# Patient Record
Sex: FEMALE | Race: WHITE | Employment: FULL TIME | URBAN - METROPOLITAN AREA
[De-identification: names, ages, dates, MRNs, and addresses within clinical notes are randomized per-mention and may not be internally consistent; named-entity substitution may affect disease eponyms.]

---

## 2017-02-13 ENCOUNTER — OFFICE VISIT (OUTPATIENT)
Dept: INTERNAL MEDICINE CLINIC | Age: 62
End: 2017-02-13

## 2017-02-13 VITALS
SYSTOLIC BLOOD PRESSURE: 119 MMHG | WEIGHT: 140 LBS | TEMPERATURE: 97.2 F | DIASTOLIC BLOOD PRESSURE: 70 MMHG | BODY MASS INDEX: 21.93 KG/M2 | HEART RATE: 75 BPM

## 2017-02-13 DIAGNOSIS — J44.1 COPD WITH EXACERBATION (HCC): Primary | ICD-10-CM

## 2017-02-13 DIAGNOSIS — J06.9 VIRAL URI WITH COUGH: ICD-10-CM

## 2017-02-13 DIAGNOSIS — J45.21 ASTHMATIC BRONCHITIS, MILD INTERMITTENT, WITH ACUTE EXACERBATION: ICD-10-CM

## 2017-02-13 PROCEDURE — 99215 OFFICE O/P EST HI 40 MIN: CPT | Performed by: INTERNAL MEDICINE

## 2017-02-13 PROCEDURE — 94640 AIRWAY INHALATION TREATMENT: CPT | Performed by: INTERNAL MEDICINE

## 2017-02-13 RX ORDER — BENZONATATE 100 MG/1
100 CAPSULE ORAL 3 TIMES DAILY PRN
Qty: 30 CAPSULE | Refills: 0 | Status: SHIPPED | OUTPATIENT
Start: 2017-02-13 | End: 2017-02-20

## 2017-02-13 RX ORDER — SULFAMETHOXAZOLE AND TRIMETHOPRIM 800; 160 MG/1; MG/1
1 TABLET ORAL 2 TIMES DAILY
Qty: 20 TABLET | Refills: 0 | Status: SHIPPED | OUTPATIENT
Start: 2017-02-13 | End: 2017-02-23

## 2017-02-13 RX ORDER — ALBUTEROL SULFATE 90 UG/1
2 AEROSOL, METERED RESPIRATORY (INHALATION) EVERY 6 HOURS PRN
Qty: 1 INHALER | Refills: 3 | Status: SHIPPED | OUTPATIENT
Start: 2017-02-13 | End: 2018-10-16

## 2017-02-24 RX ORDER — IPRATROPIUM BROMIDE AND ALBUTEROL SULFATE 2.5; .5 MG/3ML; MG/3ML
1 SOLUTION RESPIRATORY (INHALATION) ONCE
Status: COMPLETED | OUTPATIENT
Start: 2017-02-24 | End: 2017-03-08

## 2017-02-28 RX ORDER — ALBUTEROL SULFATE 2.5 MG/3ML
2.5 SOLUTION RESPIRATORY (INHALATION) ONCE
Status: COMPLETED | OUTPATIENT
Start: 2017-02-28 | End: 2017-03-09

## 2017-03-08 RX ADMIN — IPRATROPIUM BROMIDE AND ALBUTEROL SULFATE 1 AMPULE: 2.5; .5 SOLUTION RESPIRATORY (INHALATION) at 13:49

## 2017-03-09 RX ADMIN — ALBUTEROL SULFATE 2.5 MG: 2.5 SOLUTION RESPIRATORY (INHALATION) at 13:07

## 2017-08-18 DIAGNOSIS — G43.009 MIGRAINE WITHOUT AURA AND WITHOUT STATUS MIGRAINOSUS, NOT INTRACTABLE: ICD-10-CM

## 2017-08-21 RX ORDER — SUMATRIPTAN 100 MG/1
TABLET, FILM COATED ORAL
Qty: 9 TABLET | Refills: 10 | Status: SHIPPED | OUTPATIENT
Start: 2017-08-21 | End: 2018-10-08 | Stop reason: SDUPTHER

## 2017-11-06 DIAGNOSIS — Z12.11 COLON CANCER SCREENING: Primary | ICD-10-CM

## 2018-10-01 DIAGNOSIS — G43.009 MIGRAINE WITHOUT AURA AND WITHOUT STATUS MIGRAINOSUS, NOT INTRACTABLE: ICD-10-CM

## 2018-10-02 RX ORDER — SUMATRIPTAN 100 MG/1
TABLET, FILM COATED ORAL
Qty: 9 TABLET | Refills: 9 | OUTPATIENT
Start: 2018-10-02

## 2018-10-02 NOTE — TELEPHONE ENCOUNTER
Patient requesting a medication refill.   Medication: sumatriptan  Dosage: 100 mg  Frequency: 1 tab at onset of headache  Last filled on: 8/7/18  [facility]: [facility]

## 2018-10-08 DIAGNOSIS — G43.009 MIGRAINE WITHOUT AURA AND WITHOUT STATUS MIGRAINOSUS, NOT INTRACTABLE: ICD-10-CM

## 2018-10-09 RX ORDER — SUMATRIPTAN 100 MG/1
TABLET, FILM COATED ORAL
Qty: 9 TABLET | Refills: 10 | Status: SHIPPED | OUTPATIENT
Start: 2018-10-09 | End: 2018-10-16 | Stop reason: SDUPTHER

## 2018-10-16 ENCOUNTER — OFFICE VISIT (OUTPATIENT)
Dept: INTERNAL MEDICINE CLINIC | Age: 63
End: 2018-10-16
Payer: OTHER GOVERNMENT

## 2018-10-16 VITALS
BODY MASS INDEX: 21.97 KG/M2 | OXYGEN SATURATION: 99 % | HEART RATE: 76 BPM | SYSTOLIC BLOOD PRESSURE: 126 MMHG | HEIGHT: 67 IN | WEIGHT: 140 LBS | RESPIRATION RATE: 16 BRPM | DIASTOLIC BLOOD PRESSURE: 71 MMHG

## 2018-10-16 DIAGNOSIS — Z72.0 TOBACCO ABUSE: ICD-10-CM

## 2018-10-16 DIAGNOSIS — Z11.59 NEED FOR HEPATITIS C SCREENING TEST: ICD-10-CM

## 2018-10-16 DIAGNOSIS — Z13.220 SCREENING, LIPID: ICD-10-CM

## 2018-10-16 DIAGNOSIS — Z23 NEED FOR IMMUNIZATION AGAINST INFLUENZA: ICD-10-CM

## 2018-10-16 DIAGNOSIS — Z13.29 SCREENING FOR THYROID DISORDER: ICD-10-CM

## 2018-10-16 DIAGNOSIS — J01.90 ACUTE NON-RECURRENT SINUSITIS, UNSPECIFIED LOCATION: ICD-10-CM

## 2018-10-16 DIAGNOSIS — G43.009 MIGRAINE WITHOUT AURA AND WITHOUT STATUS MIGRAINOSUS, NOT INTRACTABLE: Primary | ICD-10-CM

## 2018-10-16 DIAGNOSIS — Z12.31 ENCOUNTER FOR SCREENING MAMMOGRAM FOR BREAST CANCER: ICD-10-CM

## 2018-10-16 DIAGNOSIS — Z11.4 SCREENING FOR HIV (HUMAN IMMUNODEFICIENCY VIRUS): ICD-10-CM

## 2018-10-16 DIAGNOSIS — J45.20 MILD INTERMITTENT ASTHMA WITHOUT COMPLICATION: ICD-10-CM

## 2018-10-16 PROCEDURE — 99406 BEHAV CHNG SMOKING 3-10 MIN: CPT | Performed by: INTERNAL MEDICINE

## 2018-10-16 PROCEDURE — 90471 IMMUNIZATION ADMIN: CPT | Performed by: INTERNAL MEDICINE

## 2018-10-16 PROCEDURE — 90682 RIV4 VACC RECOMBINANT DNA IM: CPT | Performed by: INTERNAL MEDICINE

## 2018-10-16 PROCEDURE — 99214 OFFICE O/P EST MOD 30 MIN: CPT | Performed by: INTERNAL MEDICINE

## 2018-10-16 RX ORDER — SUMATRIPTAN 100 MG/1
TABLET, FILM COATED ORAL
Qty: 9 TABLET | Refills: 10 | Status: SHIPPED | OUTPATIENT
Start: 2018-10-16 | End: 2019-12-05 | Stop reason: SDUPTHER

## 2018-10-16 RX ORDER — AZITHROMYCIN 250 MG/1
TABLET, FILM COATED ORAL
Qty: 1 PACKET | Refills: 0 | Status: SHIPPED | OUTPATIENT
Start: 2018-10-16 | End: 2018-10-20

## 2018-10-16 ASSESSMENT — ENCOUNTER SYMPTOMS
COUGH: 0
CONSTIPATION: 0
VOMITING: 0
SHORTNESS OF BREATH: 0
WHEEZING: 0
STRIDOR: 0
BACK PAIN: 0
EYE DISCHARGE: 0
EYE REDNESS: 0
SORE THROAT: 0
DIARRHEA: 0
ABDOMINAL PAIN: 0
EYE PAIN: 0
BLOOD IN STOOL: 0

## 2018-10-16 ASSESSMENT — PATIENT HEALTH QUESTIONNAIRE - PHQ9
SUM OF ALL RESPONSES TO PHQ9 QUESTIONS 1 & 2: 0
1. LITTLE INTEREST OR PLEASURE IN DOING THINGS: 0
SUM OF ALL RESPONSES TO PHQ QUESTIONS 1-9: 0
SUM OF ALL RESPONSES TO PHQ QUESTIONS 1-9: 0
2. FEELING DOWN, DEPRESSED OR HOPELESS: 0

## 2019-11-07 ENCOUNTER — HOSPITAL ENCOUNTER (EMERGENCY)
Age: 64
Discharge: HOME | End: 2019-11-07
Attending: EMERGENCY MEDICINE
Payer: OTHER GOVERNMENT

## 2019-11-07 VITALS
DIASTOLIC BLOOD PRESSURE: 80 MMHG | TEMPERATURE: 98.1 F | WEIGHT: 145.5 LBS | OXYGEN SATURATION: 99 % | HEART RATE: 76 BPM | BODY MASS INDEX: 22.84 KG/M2 | SYSTOLIC BLOOD PRESSURE: 128 MMHG | HEIGHT: 67 IN | RESPIRATION RATE: 14 BRPM

## 2019-11-07 DIAGNOSIS — N39.0 ACUTE LOWER URINARY TRACT INFECTION: Primary | ICD-10-CM

## 2019-11-07 LAB
BACTERIA: ABNORMAL /HPF
BILIRUBIN URINE: NEGATIVE
BLOOD, URINE: ABNORMAL
CLARITY: CLEAR
COLOR: ABNORMAL
EPITHELIAL CELLS, UA: ABNORMAL /HPF
GLUCOSE URINE: 100 MG/DL
KETONES, URINE: NEGATIVE MG/DL
LEUKOCYTE ESTERASE, URINE: ABNORMAL
MICROSCOPIC EXAMINATION: YES
NITRITE, URINE: POSITIVE
PH UA: 5.5 (ref 5–8)
PROTEIN UA: 100 MG/DL
RBC UA: ABNORMAL /HPF (ref 0–2)
RENAL EPITHELIAL, UA: ABNORMAL /HPF
SPECIFIC GRAVITY UA: 1.02 (ref 1–1.03)
TRICHOMONAS: ABNORMAL /HPF
URINE REFLEX TO CULTURE: YES
URINE TYPE: ABNORMAL
UROBILINOGEN, URINE: 4 E.U./DL
WBC UA: ABNORMAL /HPF (ref 0–5)

## 2019-11-07 PROCEDURE — 87077 CULTURE AEROBIC IDENTIFY: CPT

## 2019-11-07 PROCEDURE — 87186 SC STD MICRODIL/AGAR DIL: CPT

## 2019-11-07 PROCEDURE — 81001 URINALYSIS AUTO W/SCOPE: CPT

## 2019-11-07 PROCEDURE — 87086 URINE CULTURE/COLONY COUNT: CPT

## 2019-11-07 PROCEDURE — 99283 EMERGENCY DEPT VISIT LOW MDM: CPT

## 2019-11-07 RX ORDER — PHENAZOPYRIDINE HYDROCHLORIDE 200 MG/1
200 TABLET, FILM COATED ORAL 3 TIMES DAILY PRN
Qty: 6 TABLET | Refills: 0 | Status: SHIPPED | OUTPATIENT
Start: 2019-11-07 | End: 2019-11-10

## 2019-11-07 RX ORDER — NITROFURANTOIN 25; 75 MG/1; MG/1
100 CAPSULE ORAL 2 TIMES DAILY
Qty: 14 CAPSULE | Refills: 0 | Status: SHIPPED | OUTPATIENT
Start: 2019-11-07 | End: 2019-11-14

## 2019-11-07 ASSESSMENT — PAIN SCALES - GENERAL: PAINLEVEL_OUTOF10: 0

## 2019-11-10 LAB
ORGANISM: ABNORMAL
URINE CULTURE, ROUTINE: ABNORMAL

## 2019-12-04 DIAGNOSIS — G43.009 MIGRAINE WITHOUT AURA AND WITHOUT STATUS MIGRAINOSUS, NOT INTRACTABLE: ICD-10-CM

## 2019-12-05 RX ORDER — SUMATRIPTAN 100 MG/1
TABLET, FILM COATED ORAL
Qty: 9 TABLET | Refills: 10 | Status: SHIPPED | OUTPATIENT
Start: 2019-12-05 | End: 2020-12-02

## 2019-12-13 ENCOUNTER — OFFICE VISIT (OUTPATIENT)
Dept: INTERNAL MEDICINE CLINIC | Age: 64
End: 2019-12-13
Payer: OTHER GOVERNMENT

## 2019-12-13 VITALS
SYSTOLIC BLOOD PRESSURE: 127 MMHG | HEART RATE: 78 BPM | OXYGEN SATURATION: 97 % | DIASTOLIC BLOOD PRESSURE: 66 MMHG | WEIGHT: 147 LBS | RESPIRATION RATE: 18 BRPM | BODY MASS INDEX: 23.02 KG/M2

## 2019-12-13 DIAGNOSIS — J45.20 MILD INTERMITTENT ASTHMA WITHOUT COMPLICATION: ICD-10-CM

## 2019-12-13 DIAGNOSIS — Z72.0 TOBACCO ABUSE: ICD-10-CM

## 2019-12-13 DIAGNOSIS — G43.009 MIGRAINE WITHOUT AURA AND WITHOUT STATUS MIGRAINOSUS, NOT INTRACTABLE: Primary | ICD-10-CM

## 2019-12-13 DIAGNOSIS — M85.80 OSTEOPENIA DETERMINED BY X-RAY: ICD-10-CM

## 2019-12-13 DIAGNOSIS — Z12.11 SCREEN FOR COLON CANCER: ICD-10-CM

## 2019-12-13 DIAGNOSIS — R92.8 ABNORMAL MAMMOGRAM: ICD-10-CM

## 2019-12-13 PROCEDURE — 99214 OFFICE O/P EST MOD 30 MIN: CPT | Performed by: INTERNAL MEDICINE

## 2019-12-13 PROCEDURE — 99406 BEHAV CHNG SMOKING 3-10 MIN: CPT | Performed by: INTERNAL MEDICINE

## 2019-12-13 ASSESSMENT — ENCOUNTER SYMPTOMS
NAUSEA: 0
VOICE CHANGE: 0
CHOKING: 1
EYE ITCHING: 0
ANAL BLEEDING: 0
ABDOMINAL PAIN: 0
ABDOMINAL DISTENTION: 0
PHOTOPHOBIA: 0
VOMITING: 0
RECTAL PAIN: 0
COUGH: 0
GASTROINTESTINAL NEGATIVE: 1
DIARRHEA: 0
SORE THROAT: 0
COLOR CHANGE: 0
SINUS PAIN: 0
STRIDOR: 0
EYE PAIN: 0
CHEST TIGHTNESS: 0
TROUBLE SWALLOWING: 0
ALLERGIC/IMMUNOLOGIC NEGATIVE: 1
EYE REDNESS: 0
SHORTNESS OF BREATH: 0
SINUS PRESSURE: 0
BLOOD IN STOOL: 0
RHINORRHEA: 0
EYE DISCHARGE: 0
CONSTIPATION: 0
FACIAL SWELLING: 0
BACK PAIN: 0
WHEEZING: 0
APNEA: 0

## 2019-12-13 ASSESSMENT — PATIENT HEALTH QUESTIONNAIRE - PHQ9
1. LITTLE INTEREST OR PLEASURE IN DOING THINGS: 0
SUM OF ALL RESPONSES TO PHQ QUESTIONS 1-9: 0
SUM OF ALL RESPONSES TO PHQ QUESTIONS 1-9: 0
SUM OF ALL RESPONSES TO PHQ9 QUESTIONS 1 & 2: 0
2. FEELING DOWN, DEPRESSED OR HOPELESS: 0

## 2019-12-30 ENCOUNTER — TELEPHONE (OUTPATIENT)
Dept: INTERNAL MEDICINE CLINIC | Age: 64
End: 2019-12-30

## 2020-01-09 ENCOUNTER — TELEPHONE (OUTPATIENT)
Dept: INTERNAL MEDICINE CLINIC | Age: 65
End: 2020-01-09

## 2020-11-30 RX ORDER — SUMATRIPTAN 100 MG/1
TABLET, FILM COATED ORAL
Qty: 9 TABLET | Refills: 8 | OUTPATIENT
Start: 2020-11-30

## 2020-11-30 NOTE — TELEPHONE ENCOUNTER
Requested Prescriptions     Pending Prescriptions Disp Refills    SUMAtriptan (IMITREX) 100 MG tablet [Pharmacy Med Name: SUMAtriptan SUCC 100 MG TABLET] 9 tablet 8     Sig: TAKE ONE TABLET BY MOUTH AT ONSET OF HEADACHE   Patient requesting a medication refill.   Pharmacy: [facility]  Next office visit: Visit date not found  Last regular office visit: 12/13/2019

## 2020-12-02 NOTE — TELEPHONE ENCOUNTER
Patient called and made an appointment. Requested Prescriptions     Pending Prescriptions Disp Refills    SUMAtriptan (IMITREX) 100 MG tablet [Pharmacy Med Name: SUMAtriptan SUCC 100 MG TABLET] 9 tablet 8     Sig: TAKE ONE TABLET BY MOUTH AT ONSET OF HEADACHE   Patient requesting a medication refill.     [facility]  Next office visit: 12/22/2020  Last regular office visit: 12/13/2019

## 2020-12-03 RX ORDER — SUMATRIPTAN 100 MG/1
TABLET, FILM COATED ORAL
Qty: 9 TABLET | Refills: 0 | Status: SHIPPED | OUTPATIENT
Start: 2020-12-03 | End: 2021-05-24 | Stop reason: SDUPTHER

## 2020-12-22 ENCOUNTER — TELEPHONE (OUTPATIENT)
Dept: INTERNAL MEDICINE CLINIC | Age: 65
End: 2020-12-22

## 2021-05-24 ENCOUNTER — OFFICE VISIT (OUTPATIENT)
Dept: INTERNAL MEDICINE CLINIC | Age: 66
End: 2021-05-24
Payer: OTHER GOVERNMENT

## 2021-05-24 VITALS
HEART RATE: 71 BPM | BODY MASS INDEX: 22.76 KG/M2 | WEIGHT: 145 LBS | DIASTOLIC BLOOD PRESSURE: 76 MMHG | HEIGHT: 67 IN | OXYGEN SATURATION: 97 % | SYSTOLIC BLOOD PRESSURE: 121 MMHG

## 2021-05-24 DIAGNOSIS — Z00.00 WELL WOMAN EXAM WITHOUT GYNECOLOGICAL EXAM: Primary | ICD-10-CM

## 2021-05-24 DIAGNOSIS — M85.89 OSTEOPENIA OF MULTIPLE SITES: ICD-10-CM

## 2021-05-24 DIAGNOSIS — Z72.0 TOBACCO ABUSE: ICD-10-CM

## 2021-05-24 DIAGNOSIS — G43.009 MIGRAINE WITHOUT AURA AND WITHOUT STATUS MIGRAINOSUS, NOT INTRACTABLE: ICD-10-CM

## 2021-05-24 DIAGNOSIS — Z12.31 ENCOUNTER FOR SCREENING MAMMOGRAM FOR MALIGNANT NEOPLASM OF BREAST: ICD-10-CM

## 2021-05-24 DIAGNOSIS — Z98.82 BREAST IMPLANT STATUS: ICD-10-CM

## 2021-05-24 DIAGNOSIS — M85.80 OSTEOPENIA DETERMINED BY X-RAY: ICD-10-CM

## 2021-05-24 DIAGNOSIS — Z12.11 SCREEN FOR COLON CANCER: ICD-10-CM

## 2021-05-24 PROCEDURE — 99406 BEHAV CHNG SMOKING 3-10 MIN: CPT | Performed by: INTERNAL MEDICINE

## 2021-05-24 PROCEDURE — 99213 OFFICE O/P EST LOW 20 MIN: CPT | Performed by: INTERNAL MEDICINE

## 2021-05-24 PROCEDURE — 99397 PER PM REEVAL EST PAT 65+ YR: CPT | Performed by: INTERNAL MEDICINE

## 2021-05-24 RX ORDER — SUMATRIPTAN 100 MG/1
TABLET, FILM COATED ORAL
Qty: 9 TABLET | Refills: 11 | Status: SHIPPED | OUTPATIENT
Start: 2021-05-24 | End: 2021-12-17

## 2021-05-24 ASSESSMENT — PATIENT HEALTH QUESTIONNAIRE - PHQ9
SUM OF ALL RESPONSES TO PHQ QUESTIONS 1-9: 0
2. FEELING DOWN, DEPRESSED OR HOPELESS: 0
SUM OF ALL RESPONSES TO PHQ9 QUESTIONS 1 & 2: 0
SUM OF ALL RESPONSES TO PHQ QUESTIONS 1-9: 0

## 2021-05-24 NOTE — PROGRESS NOTES
SUBJECTIVE:   77 y.o. female for annual routine Pap and checkup. Current Outpatient Medications   Medication Sig Dispense Refill    SUMAtriptan (IMITREX) 100 MG tablet TAKE ONE TABLET BY MOUTH AT ONSET OF HEADACHE 9 tablet 0     No current facility-administered medications for this visit. Allergies: Patient has no known allergies. No LMP recorded. Patient is postmenopausal.    ROS:  Feeling well. No dyspnea or chest pain on exertion. No abdominal pain, change in bowel habits, black or bloody stools. No urinary tract symptoms. GYN ROS: {gyn ros:935656::\"normal menses, no abnormal bleeding, pelvic pain or discharge\",\"no breast pain or new or enlarging lumps on self exam\"}. No neurological complaints. OBJECTIVE:   The patient appears well, alert, oriented x 3, in no distress. /76 (Site: Right Upper Arm, Position: Sitting, Cuff Size: Medium Adult)   Pulse 71   Ht 5' 7\" (1.702 m)   Wt 145 lb (65.8 kg)   SpO2 97%   BMI 22.71 kg/m²   ENT normal.  Neck supple. No adenopathy or thyromegaly. AWAIS. Lungs are clear, good air entry, no wheezes, rhonchi or rales. S1 and S2 normal, no murmurs, regular rate and rhythm. Abdomen soft without tenderness, guarding, mass or organomegaly. Extremities show no edema, normal peripheral pulses. Neurological is normal, no focal findings.     BREAST EXAM: {pe breast exam:029525::\"breasts appear normal, no suspicious masses, no skin or nipple changes or axillary nodes\"}    PELVIC EXAM: {pelvic exam:217674::\"normal external genitalia, vulva, vagina, cervix, uterus and adnexa\"}    ASSESSMENT:   {gyn assessment:623480::\"well woman\"}    PLAN:   {gyn plan:785824::\"mammogram\",\"pap smear\",\"return annually or prn\"}

## 2021-05-24 NOTE — PATIENT INSTRUCTIONS
a colonoscopy. Sigmoidoscopy. This test lets your doctor look at the lining of your rectum and the lowest part of your colon. Your doctor uses a lighted tube called a sigmoidoscope. This test can't find cancers or polyps in the upper part of your colon. In some cases, polyps that are found can be removed. But if your doctor finds polyps, you will need to have a colonoscopy to check the upper part of your colon. Colonoscopy. This test lets your doctor look at the lining of your rectum and your entire colon. The doctor uses a thin, flexible tool called a colonoscope. It can also be used to remove polyps or get a tissue sample (biopsy). A less common test is CT colonography (CTC). It's also called virtual colonoscopy. Who should be screened for colorectal cancer? Your risk for colorectal cancer gets higher as you get older. Some experts say that adults should start regular screening at age 48 and stop at age 76. Others say to start before age 48 or continue after age 76. Talk with your doctor about your risk and when to start and stop screening. How often you need screening depends on the type of test you get:  Stool tests. Every 1 or 2 years for FIT or gFOBT. Every 3 years for sDNA, also called FIT-DNA. Tests that look inside the colon. Every 5 or 10 years for sigmoidoscopy. Every 5 years for CT colonography (virtual colonoscopy). Every 10 years for colonoscopy. Experts agree that people at higher risk may need to be tested sooner. This includes people who have a strong family history of colon cancer. Talk to your doctor about which test is best for you and when to be tested. When should you call for help? Watch closely for changes in your health, and be sure to contact your doctor if:    · You have any changes in your bowel habits.     · You have any problems. Where can you learn more? Go to https://rosa isela.[employer]. org and sign in to your [employer] account.  Enter M541 in the can help you stay healthy. Your doctor has checked your overall health and may have suggested ways to take good care of yourself. Your doctor also may have recommended tests. At home, you can help prevent illness with healthy eating, regular exercise, and other steps. Follow-up care is a key part of your treatment and safety. Be sure to make and go to all appointments, and call your doctor if you are having problems. It's also a good idea to know your test results and keep a list of the medicines you take. How can you care for yourself at home? · Get screening tests that you and your doctor decide on. Screening helps find diseases before any symptoms appear. · Eat healthy foods. Choose fruits, vegetables, whole grains, protein, and low-fat dairy foods. Limit fat, especially saturated fat. Reduce salt in your diet. · Limit alcohol. If you are a man, have no more than 2 drinks a day or 14 drinks a week. If you are a woman, have no more than 1 drink a day or 7 drinks a week. Since alcohol affects older adults differently, you may want to limit alcohol even more. Or you may not want to drink at all. · Get at least 30 minutes of exercise on most days of the week. Walking is a good choice. You also may want to do other activities, such as running, swimming, cycling, or playing tennis or team sports. · Reach and stay at a healthy weight. This will lower your risk for many problems, such as obesity, diabetes, heart disease, and high blood pressure. · Do not smoke. Smoking can make health problems worse. If you need help quitting, talk to your doctor about stop-smoking programs and medicines. These can increase your chances of quitting for good. · Care for your mental health. It is easy to get weighed down by worry and stress. Learn strategies to manage stress, like deep breathing and mindfulness, and stay connected with your family and community. If you find you often feel sad or hopeless, talk with your doctor. Treatment can help. · Talk to your doctor about whether you have any risk factors for sexually transmitted infections (STIs). You can help prevent STIs if you wait to have sex with a new partner (or partners) until you've each been tested for STIs. It also helps if you use condoms (male or female condoms) and if you limit your sex partners to one person who only has sex with you. Vaccines are available for some STIs. · If you think you may have a problem with alcohol or drug use, talk to your doctor. This includes prescription medicines (such as amphetamines and opioids) and illegal drugs (such as cocaine and methamphetamine). Your doctor can help you figure out what type of treatment is best for you. · Protect your skin from too much sun. When you're outdoors from 10 a.m. to 4 p.m., stay in the shade or cover up with clothing and a hat with a wide brim. Wear sunglasses that block UV rays. Even when it's cloudy, put broad-spectrum sunscreen (SPF 30 or higher) on any exposed skin. · See a dentist one or two times a year for checkups and to have your teeth cleaned. · Wear a seat belt in the car. When should you call for help? Watch closely for changes in your health, and be sure to contact your doctor if you have any problems or symptoms that concern you. Where can you learn more? Go to https://chmarkeb.healthShopSavvypartners. org and sign in to your CloudApp[employer] account. Enter I818 in the [employer] box to learn more about \"Well Visit, Over 65: Care Instructions. \"     If you do not have an account, please click on the \"Sign Up Now\" link. Current as of: May 27, 2020               Content Version: 12.8  © 8016-0321 Healthwise, [employer]. Care instructions adapted under license by [facility] [place] ([facility]). If you have questions about a medical condition or this instruction, always ask your healthcare professional. [address] any warranty or liability for your use of this information.

## 2021-05-24 NOTE — PROGRESS NOTES
Chief Complaint   Patient presents with    Annual Exam       HPI: Here for well adult visit. Also requests refills on her sumatriptan hand. She states her headaches may be a little less severe but they have not reduced in frequency. She continues smoking and states she loves it and does not think she would ever be able to quit entirely. However, she is mostly smoking on weekends not during her work week. Walks her dog every other day but no other exercise. She is concerned about a small nevus on her left temple. States she had a cherry angioma for a long time and then it turned dark over the last couple of months. I have reviewed the chart notes available from myself and other providers. I have reviewed and addressed all active problems and created or updated the problems list in detail, as needed. No problem-specific Assessment & Plan notes found for this encounter.       I have extensively reviewed and reconciled the medication list, discontinued medications not taking or no longer appropriate, and updated the active meds list      Lab Results   Component Value Date    LABMICR YES 11/07/2019       Lab Results   Component Value Date     08/11/2015    K 4.4 08/11/2015    CL 97 (L) 08/11/2015    CO2 25 08/11/2015    BUN 12 08/11/2015    CREATININE 0.6 08/11/2015    GLUCOSE 89 08/11/2015    CALCIUM 10.4 08/11/2015       Lab Results   Component Value Date    CHOL 203 (H) 08/11/2015    TRIG 93 08/11/2015    HDL 55 08/11/2015    LDLCALC 129 (H) 08/11/2015       Lab Results   Component Value Date    ALT 11 08/11/2015    AST 15 08/11/2015       No results found for: TSH, T4FREE    No results found for: WBC, HGB, HCT, MCV, PLT    No results found for: INR     No results found for: PSA     No results found for: LABURIC          /76 (Site: Right Upper Arm, Position: Sitting, Cuff Size: Medium Adult)   Pulse 71   Ht 5' 7\" (1.702 m)   Wt 145 lb (65.8 kg)   SpO2 97%   BMI previously documented in the patient's chart, with changes per orders or documentation below:        Assessment and Plan: Patient received counseling and, if relevant,printed instructions for all symptoms listed in CC and HPI, as well as for all diagnoses brought onto today's visit note below. Typical counseling includes, but is not limited to, non pharmacologic measures to manage listed symptoms and conditions; appropriate use, risks and benefits for all prescribed medications; potential interactions between medications both prescribed and OTC; diet; exercise; healthy behaviors; and goalsetting to improve health. Pt.or responsible party was involved in shared decision making and had opportunity to have all questions answered. 1. Well woman exam without gynecological exam  - Cologuard (For External Results Only); Future  - HEPATITIS C ANTIBODY; Future  - LIPID PANEL; Future  - COMPREHENSIVE METABOLIC PANEL; Future    2. Osteopenia of multiple sites--counseled on incresing dietary calcium, weight bearing exercise  - DEXA BONE DENSITY AXIAL SKELETON; Future    3. Migraine without aura and without status migrainosus, not intractable  - SUMAtriptan (IMITREX) 100 MG tablet; TAKE ONE TABLET BY MOUTH AT ONSET OF HEADACHE  Dispense: 9 tablet; Refill: 11    4. Encounter for screening mammogram for malignant neoplasm of breast  - JERMAN DIGITAL SCREEN W OR WO CAD BILATERAL; Future    5. Breast implant status    6. Osteopenia determined by x-ray    7. Tobacco abuse    Patient was counseled on tobacco cessation. Based upon patient's motivation to change her behavior, the following plan was agreed upon: patient will think about his/her triggers for using tobacco and patient will think about reasons why he/she should quit using tobacco.  She was provided with a list of local tobacco cessation resources. Provider spent 5 minutes counseling patient.      8. Screen for colon cancer            Problem List     Migraine Relevant Medications    SUMAtriptan (IMITREX) 100 MG tablet    Osteopenia determined by x-ray    Tobacco abuse    Breast implant status    Osteopenia of multiple sites    Relevant Orders    DEXA BONE DENSITY AXIAL SKELETON        Orders Placed This Encounter   Procedures    Klarissa (For External Results Only)     This test is performed by an external laboratory and is used for result attachment only. It is required that this order requisition be faxed to: [facility] @ [phone]. See [email or web address].AvePoint for further information. Standing Status:   Future     Standing Expiration Date:   5/24/2022    JERMAN DIGITAL SCREEN W OR WO CAD BILATERAL     Standing Status:   Future     Standing Expiration Date:   7/25/2022    DEXA BONE DENSITY AXIAL SKELETON     Standing Status:   Future     Standing Expiration Date:   5/24/2022    HEPATITIS C ANTIBODY     Standing Status:   Future     Standing Expiration Date:   5/24/2022    LIPID PANEL     Standing Status:   Future     Standing Expiration Date:   5/24/2022     Order Specific Question:   Is Patient Fasting?/# of Hours     Answer:   yes    COMPREHENSIVE METABOLIC PANEL     Standing Status:   Future     Standing Expiration Date:   5/24/2022       I have reconciled the medications in chart with what patient reports to be taking, andreviewed action/ sideeffects and how to take any new medications. Patient/caregiver understands purpose and side effects. A complete  list of medications was provided in their after-visit summary. Return in about 1 year (around 5/24/2022) for well adult.

## 2021-12-17 DIAGNOSIS — G43.009 MIGRAINE WITHOUT AURA AND WITHOUT STATUS MIGRAINOSUS, NOT INTRACTABLE: ICD-10-CM

## 2021-12-17 RX ORDER — SUMATRIPTAN 100 MG/1
TABLET, FILM COATED ORAL
Qty: 9 TABLET | Refills: 11 | Status: SHIPPED | OUTPATIENT
Start: 2021-12-17 | End: 2022-06-23 | Stop reason: SDUPTHER

## 2021-12-17 NOTE — TELEPHONE ENCOUNTER
Requested Prescriptions     Pending Prescriptions Disp Refills    SUMAtriptan (IMITREX) 100 MG tablet [Pharmacy Med Name: SUMAtriptan SUCC 100 MG TABLET] 9 tablet 11     Sig: TAKE ONE TABLET BY MOUTH AT ONSET OF HEADACHE       Patient requesting a medication refill.   Last filled on: 11.08.2021  [facility]  Next office visit: Visit date not found  Last regular office visit: 5/24/2021

## 2022-02-14 ENCOUNTER — OFFICE VISIT (OUTPATIENT)
Dept: VASCULAR SURGERY | Age: 67
End: 2022-02-14
Payer: OTHER GOVERNMENT

## 2022-02-14 VITALS
HEART RATE: 88 BPM | WEIGHT: 140 LBS | DIASTOLIC BLOOD PRESSURE: 65 MMHG | HEIGHT: 67 IN | BODY MASS INDEX: 21.97 KG/M2 | SYSTOLIC BLOOD PRESSURE: 113 MMHG

## 2022-02-14 DIAGNOSIS — I80.9 THROMBOPHLEBITIS: Primary | ICD-10-CM

## 2022-02-14 PROCEDURE — 99203 OFFICE O/P NEW LOW 30 MIN: CPT | Performed by: STUDENT IN AN ORGANIZED HEALTH CARE EDUCATION/TRAINING PROGRAM

## 2022-02-14 ASSESSMENT — ENCOUNTER SYMPTOMS
COLOR CHANGE: 1
SHORTNESS OF BREATH: 0

## 2022-02-14 NOTE — PROGRESS NOTES
Betty Vega (:  1955) is a 74 y.o. female,New patient, here for evaluation of the following chief complaint(s):  New Patient (RLE; painful area; 6-8 weeks. wears compression stockings; notices the pain more at night)         ASSESSMENT/PLAN:  1. Thrombophlebitis  -     VL DUP LOWER EXTREMITY VENOUS RIGHT; Future       This is a 67-year-old female patient presents the office today to discuss right lower extremity anterior leg edema and tenderness. Her symptoms are little unusual and innocuous. I suspect maybe she had thrombophlebitis or maybe even a injury that she did not know about. It could have been hematoma however she does not really have remnants of bruising. I think the easiest thing to do is to rule out any sort of significant thrombophlebitic picture. Would recommend a venous duplex to rule out DVT and superficial thrombophlebitis. We will continue to use the compression stockings as a seem to be helping her. Recommend a baby aspirin daily given her likely history of peripheral arterial disease. I cannot palpate her pedal pulses. However she endorses no claudication nor rest pain or anything that otherwise suggest that she has significant arterial insufficiency. Therefore there is no reason to screen for that just yet. However we did have to increase her compression size I would likely go ahead and get screening arterial duplexes to make sure there is no significant limitation in her SRAVAN. All questions were answered. Venous duplex ordered. Subjective   SUBJECTIVE/OBJECTIVE:  This is a 74year old female patient who presented to the office today to discuss right anterior calf edema and tenderness. She does smoke. She denies any claudication or rest pain. She works as a nurse. She has been using a 20-30 mmHg stocking with good efficacy. Her symptoms began several weeks ago. She denies any chest pain or shortness of breath.   Once again she had some efficacy with using the

## 2022-02-21 ENCOUNTER — HOSPITAL ENCOUNTER (OUTPATIENT)
Dept: VASCULAR LAB | Age: 67
Discharge: HOME | End: 2022-02-21
Payer: OTHER GOVERNMENT

## 2022-02-21 DIAGNOSIS — I80.9 THROMBOPHLEBITIS: ICD-10-CM

## 2022-02-21 PROCEDURE — 93971 EXTREMITY STUDY: CPT

## 2022-05-09 ENCOUNTER — OFFICE VISIT (OUTPATIENT)
Dept: VASCULAR SURGERY | Age: 67
End: 2022-05-09
Payer: OTHER GOVERNMENT

## 2022-05-09 VITALS — BODY MASS INDEX: 22.76 KG/M2 | WEIGHT: 145 LBS | HEIGHT: 67 IN

## 2022-05-09 DIAGNOSIS — R22.41 LOCALIZED SWELLING OF RIGHT LOWER LEG: Primary | ICD-10-CM

## 2022-05-09 PROCEDURE — 99213 OFFICE O/P EST LOW 20 MIN: CPT | Performed by: STUDENT IN AN ORGANIZED HEALTH CARE EDUCATION/TRAINING PROGRAM

## 2022-05-09 ASSESSMENT — ENCOUNTER SYMPTOMS
SHORTNESS OF BREATH: 0
COLOR CHANGE: 0

## 2022-05-09 NOTE — PROGRESS NOTES
Bishop Melara (:  1955) is a 74 y.o. female,Established patient, here for evaluation of the following chief complaint(s):  Leg Pain         ASSESSMENT/PLAN:  1. Localized swelling of right lower leg    This is a 74year old female patient who presents for evaluation of right leg swelling. The area of concern did have a round structure with apparent blood vessels around it but no discernible flow within the structure itself. It would seem to have been a thrombosed vein based on its appearance; unusual for it to be a pseudoaneurysm that thrombosed but possible. The working diagnosis at the time was that this was a phlebitic process and would resolve with compression/NSAIDS/warm compresses. Once again all of her symptoms began after an episode of pain walking down steps and then noting bruising in the area; so it would all seem abrupt and traumatic more or less. Since then she has had this intermittent discomfort. No palpable mass underneath the skin; however she does have this asymmetric fullness that is mild but still present when compared to her left leg. Would recommend trying a tighter compression wrap/garment. Would be beneficial to pursue a CT scan with contrast to further evaluate the area. If it is indeed a thrombosed vascular structure, could consider excision to see if it helps her symptoms; although the risk to surround nerve structures is present. Low suspicion for a tumor or something sinister given her recollection of bruising and sharp pain around the time this all began in that location but a CT scan would help further evaluate. Did offer her a second opinion to evaluate but she is open to trying a tighter local compression garment and conservative therapy for now which would include warmth/ice. All other questions answered. Will order CT scan.          Subjective   SUBJECTIVE/OBJECTIVE:  This is a 74year old female patient who presents to the office for evaluation of right lower extremity outer leg swelling. She underwent a duplex in February which demonstrated no evidence of DVT but a solid structure with questionable flow adjacent. The patient denies any resolution of this symptoms. She has discomfort that occurs mainly at the end of the day. Her symptoms began 5 months ago where she noticed bruising in this area and then all of her symptoms began. She had seen other physicians in the past but this was not distinctly a musculoskeletal problem. She endorses pain at night mainly after the end of the day. She denies rest pain in her foot. No other swelling appreciated. No further episodes of bruising appreciated. Review of Systems   Constitutional: Negative for chills and fever. Respiratory: Negative for shortness of breath. Cardiovascular: Positive for leg swelling. Negative for chest pain. Musculoskeletal: Positive for myalgias. Negative for arthralgias and gait problem. Skin: Negative for color change and wound. Neurological: Positive for numbness. Negative for weakness. Hematological: Does not bruise/bleed easily. Objective   Physical Exam  Constitutional:       Appearance: Normal appearance. Cardiovascular:      Rate and Rhythm: Normal rate and regular rhythm. Comments: Non palpable pedal pulses, feet warm to touch, DP/PT signals present  Pulmonary:      Effort: Pulmonary effort is normal. No respiratory distress. Musculoskeletal:      Right lower leg: Edema (localized to distal right outer leg just above ankle mortise ) present. Skin:     General: Skin is warm and dry. Capillary Refill: Capillary refill takes less than 2 seconds. Neurological:      General: No focal deficit present. Mental Status: She is alert. Psychiatric:         Mood and Affect: Mood normal.         Behavior: Behavior normal.         Thought Content:  Thought content normal.         Judgment: Judgment normal.            On this date 5/9/2022 I have spent 25 minutes reviewing previous notes, test results and face to face with the patient discussing the diagnosis and importance of compliance with the treatment plan as well as documenting on the day of the visit.     Jamir Engel DO, FSVS, [address] [facility]

## 2022-05-31 ENCOUNTER — APPOINTMENT (OUTPATIENT)
Dept: CT IMAGING | Age: 67
End: 2022-05-31
Payer: OTHER GOVERNMENT

## 2022-05-31 ENCOUNTER — HOSPITAL ENCOUNTER (OUTPATIENT)
Dept: CT IMAGING | Age: 67
Discharge: HOME | End: 2022-05-31
Payer: OTHER GOVERNMENT

## 2022-05-31 DIAGNOSIS — R22.41 LOCALIZED SWELLING OF RIGHT LOWER LEG: ICD-10-CM

## 2022-05-31 LAB
CREAT SERPL-MCNC: 0.7 MG/DL (ref 0.6–1.2)
GFR AFRICAN AMERICAN: >60
GFR NON-AFRICAN AMERICAN: >60

## 2022-05-31 PROCEDURE — 6360000004 HC RX CONTRAST MEDICATION: Performed by: STUDENT IN AN ORGANIZED HEALTH CARE EDUCATION/TRAINING PROGRAM

## 2022-05-31 PROCEDURE — 36415 COLL VENOUS BLD VENIPUNCTURE: CPT

## 2022-05-31 PROCEDURE — 82565 ASSAY OF CREATININE: CPT

## 2022-05-31 PROCEDURE — 73701 CT LOWER EXTREMITY W/DYE: CPT

## 2022-05-31 RX ADMIN — IOPAMIDOL 100 ML: 755 INJECTION, SOLUTION INTRAVENOUS at 16:22

## 2022-06-01 ENCOUNTER — TELEPHONE (OUTPATIENT)
Dept: SURGERY | Age: 67
End: 2022-06-01

## 2022-06-01 DIAGNOSIS — M79.89 MASS OF SOFT TISSUE OF RIGHT LOWER EXTREMITY: Primary | ICD-10-CM

## 2022-06-02 DIAGNOSIS — M79.89 MASS OF SOFT TISSUE OF RIGHT LOWER EXTREMITY: Primary | ICD-10-CM

## 2022-06-02 NOTE — TELEPHONE ENCOUNTER
Aiden Kamara, DO  You 5 minutes ago (10:30 AM)     SM    Okay MRI with and without contrast right leg    Message text

## 2022-06-13 ENCOUNTER — HOSPITAL ENCOUNTER (OUTPATIENT)
Dept: MRI IMAGING | Age: 67
Discharge: HOME | End: 2022-06-13
Payer: OTHER GOVERNMENT

## 2022-06-13 DIAGNOSIS — M79.89 MASS OF SOFT TISSUE OF RIGHT LOWER EXTREMITY: ICD-10-CM

## 2022-06-13 PROCEDURE — A9577 INJ MULTIHANCE: HCPCS | Performed by: STUDENT IN AN ORGANIZED HEALTH CARE EDUCATION/TRAINING PROGRAM

## 2022-06-13 PROCEDURE — 73720 MRI LWR EXTREMITY W/O&W/DYE: CPT

## 2022-06-13 PROCEDURE — 6360000004 HC RX CONTRAST MEDICATION: Performed by: STUDENT IN AN ORGANIZED HEALTH CARE EDUCATION/TRAINING PROGRAM

## 2022-06-13 RX ADMIN — GADOBENATE DIMEGLUMINE 13 ML: 529 INJECTION, SOLUTION INTRAVENOUS at 17:44

## 2022-06-23 ENCOUNTER — OFFICE VISIT (OUTPATIENT)
Dept: INTERNAL MEDICINE CLINIC | Age: 67
End: 2022-06-23
Payer: OTHER GOVERNMENT

## 2022-06-23 VITALS
WEIGHT: 143 LBS | HEART RATE: 75 BPM | OXYGEN SATURATION: 100 % | DIASTOLIC BLOOD PRESSURE: 72 MMHG | SYSTOLIC BLOOD PRESSURE: 121 MMHG | BODY MASS INDEX: 22.4 KG/M2

## 2022-06-23 DIAGNOSIS — M85.89 OSTEOPENIA OF MULTIPLE SITES: ICD-10-CM

## 2022-06-23 DIAGNOSIS — M40.04 POSTURAL KYPHOSIS OF THORACIC REGION: ICD-10-CM

## 2022-06-23 DIAGNOSIS — Z72.0 TOBACCO ABUSE: ICD-10-CM

## 2022-06-23 DIAGNOSIS — J43.1 PANLOBULAR EMPHYSEMA (HCC): ICD-10-CM

## 2022-06-23 DIAGNOSIS — Z01.818 PRE-OP EVALUATION: Primary | ICD-10-CM

## 2022-06-23 DIAGNOSIS — Z12.31 ENCOUNTER FOR SCREENING MAMMOGRAM FOR MALIGNANT NEOPLASM OF BREAST: ICD-10-CM

## 2022-06-23 DIAGNOSIS — G43.009 MIGRAINE WITHOUT AURA AND WITHOUT STATUS MIGRAINOSUS, NOT INTRACTABLE: ICD-10-CM

## 2022-06-23 DIAGNOSIS — R22.41 MASS OF RIGHT LOWER EXTREMITY: ICD-10-CM

## 2022-06-23 PROBLEM — M40.204 KYPHOSIS OF THORACIC REGION: Status: ACTIVE | Noted: 2022-06-23

## 2022-06-23 PROCEDURE — 99243 OFF/OP CNSLTJ NEW/EST LOW 30: CPT | Performed by: INTERNAL MEDICINE

## 2022-06-23 PROCEDURE — 93000 ELECTROCARDIOGRAM COMPLETE: CPT | Performed by: INTERNAL MEDICINE

## 2022-06-23 RX ORDER — SUMATRIPTAN 100 MG/1
100 TABLET, FILM COATED ORAL DAILY PRN
Qty: 9 TABLET | Refills: 11 | Status: ON HOLD
Start: 2022-06-23 | End: 2022-08-16 | Stop reason: HOSPADM

## 2022-06-23 ASSESSMENT — PATIENT HEALTH QUESTIONNAIRE - PHQ9
SUM OF ALL RESPONSES TO PHQ QUESTIONS 1-9: 0
SUM OF ALL RESPONSES TO PHQ9 QUESTIONS 1 & 2: 0
SUM OF ALL RESPONSES TO PHQ QUESTIONS 1-9: 0
SUM OF ALL RESPONSES TO PHQ QUESTIONS 1-9: 0
2. FEELING DOWN, DEPRESSED OR HOPELESS: 0
SUM OF ALL RESPONSES TO PHQ QUESTIONS 1-9: 0
1. LITTLE INTEREST OR PLEASURE IN DOING THINGS: 0

## 2022-06-23 NOTE — TELEPHONE ENCOUNTER
Requested Prescriptions     Pending Prescriptions Disp Refills    SUMAtriptan (IMITREX) 100 MG tablet 9 tablet 11       Patient requesting a medication refill.   Last filled on:   [facility]  Next office visit: Visit date not found  Last regular office visit: 6/23/2022

## 2022-06-23 NOTE — PROGRESS NOTES
Preoperative Consultation      Priscila Toussaint  YOB: 1955    Date of Service:  6/23/2022    Vitals:    06/23/22 1351   BP: 121/72   Pulse: 75   SpO2: 100%   Weight: 143 lb (64.9 kg)      Wt Readings from Last 2 Encounters:   06/23/22 143 lb (64.9 kg)   06/13/22 145 lb (65.8 kg)     BP Readings from Last 3 Encounters:   06/23/22 121/72   02/14/22 113/65   05/24/21 121/76        Chief Complaint   Patient presents with    Pre-op Exam     No Known Allergies  No outpatient medications have been marked as taking for the 6/23/22 encounter (Office Visit) with Piedad Ventura MD.       This patient presents to the office today for a preoperative consultation at the request of surgeon, Dr. Aubrie Almaguer, who plans on performing RIGHT lower leg biopsy of mass on June 29 at [facility].  The current problem began 6 months ago, and symptoms have been worsening with time. Conservative therapy: Yes: compression, which has been making it worse, discontinued. .Now affecting her use of ankle, causing her to limp.     Planned anesthesia: General ? 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, including shoulder    Seborrheic keratosis    Migraine    Osteopenia determined by x-ray    Abnormal mammogram    SCC (squamous cell carcinoma)    Tobacco abuse    Mild intermittent asthma without complication    Breast implant status    Osteopenia of multiple sites    Panlobular emphysema (HCC)    Kyphosis of thoracic region    Mass of right lower extremity       Past Medical History:   Diagnosis Date    Headache      Past Surgical History:   Procedure Laterality Date    APPENDECTOMY      TONSILLECTOMY       Family History   Problem Relation Age of Onset    Arthritis Mother     High Blood Pressure Mother      Social History     Socioeconomic History    Marital status: Single     Spouse name: Not on file    Number of children: Not on file    Years of education: Not on file    Highest education level: Not on file   Occupational History    Occupation: health tech   Tobacco Use    Smoking status: Current Every Day Smoker     Packs/day: 1.00     Years: 50.00     Pack years: 50.00     Types: Cigarettes    Smokeless tobacco: Never Used   Substance and Sexual Activity    Alcohol use: No    Drug use: No    Sexual activity: Not on file   Other Topics Concern    Not on file   Social History Narrative    Not on file     Social Determinants of Health     Financial Resource Strain:     Difficulty of Paying Living Expenses: Not on file   Food Insecurity:     Worried About Running Out of Food in the Last Year: Not on file    [place] of Food in the Last Year: Not on file   Transportation Needs:     Lack of Transportation (Medical): Not on file    Lack of Transportation (Non-Medical):  Not on file   Physical Activity:     Days of Exercise per Week: Not on file    Minutes of Exercise per Session: Not on file   Stress:     Feeling of Stress : Not on file   Social Connections:     Frequency of Communication with Friends and Family: Not on file    Frequency of Social Gatherings with Friends and Family: Not on file    Attends Bahai Services: Not on file    Active Member of [address] [place] [address] or Organizations: Not on file    Attends Club or Organization Meetings: Not on file    Marital Status: Not on file   Intimate Partner Violence:     Fear of Current or Ex-Partner: Not on file    Emotionally Abused: Not on file    Physically Abused: Not on file    Sexually Abused: Not on file   Housing Stability:     Unable to Pay for Housing in the Last Year: Not on file    Number of [place] in the Last Year: Not on file    Unstable Housing in the Last Year: Not on file       Review of Systems  A comprehensive review of systems was negative except for what was noted in the HPI. Physical Exam   Constitutional: She is oriented to person, place, and time. She appears well-developed and well-nourished. No distress. HENT:   Head: Normocephalic and atraumatic. Mouth/Throat: Uvula is midline, oropharynx is clear and moist and mucous membranes are normal.   Eyes: Conjunctivae and EOM are normal. Pupils are equal, round, and reactive to light. Neck: Trachea normal and normal range of motion. Neck supple. No JVD present. Carotid bruit is not present. No mass and no thyromegaly present. Cardiovascular: Normal rate, regular rhythm, normal heart sounds and intact distal pulses. Exam reveals no gallop and no friction rub. No murmur heard. Pulmonary/Chest: Effort normal and breath sounds normal. No respiratory distress. She has no wheezes. She has no rales. Abdominal: Soft. Normal aorta and bowel sounds are normal. She exhibits no distension and no mass. There is no hepatosplenomegaly. No tenderness. Musculoskeletal: She exhibits no edema and no tenderness. Neurological: She is alert and oriented to person, place, and time. She has normal strength. No cranial nerve deficit or sensory deficit. Coordination and gait normal.   Skin: Skin is warm and dry. No rash noted. No erythema. Psychiatric: She has a normal mood and affect. Her behavior is normal.     EKG Interpretation: no comparison, old anterior MI with septal qs    Lab Review   Hospital Outpatient Visit on 05/31/2022   Component Date Value    CREATININE 05/31/2022 0.7     GFR Non- 05/31/2022 >60     GFR  05/31/2022 >60            Assessment:       74 y.o. patient with planned surgery as above.     Known risk factors for perioperative complications: Tobacco abuse, clinical emphysema (pt never complained of any symptoms)  Current medications which may produce withdrawal symptoms if withheld

## 2022-07-13 ENCOUNTER — OFFICE VISIT (OUTPATIENT)
Dept: INTERNAL MEDICINE CLINIC | Age: 67
End: 2022-07-13
Payer: OTHER GOVERNMENT

## 2022-07-13 ENCOUNTER — HOSPITAL ENCOUNTER (OUTPATIENT)
Age: 67
Discharge: HOME | End: 2022-07-13
Payer: OTHER GOVERNMENT

## 2022-07-13 ENCOUNTER — HOSPITAL ENCOUNTER (OUTPATIENT)
Dept: GENERAL RADIOLOGY | Age: 67
Discharge: HOME | End: 2022-07-13
Payer: OTHER GOVERNMENT

## 2022-07-13 VITALS
SYSTOLIC BLOOD PRESSURE: 117 MMHG | BODY MASS INDEX: 22.98 KG/M2 | HEART RATE: 91 BPM | HEIGHT: 67 IN | WEIGHT: 146.38 LBS | OXYGEN SATURATION: 97 % | DIASTOLIC BLOOD PRESSURE: 66 MMHG

## 2022-07-13 DIAGNOSIS — C44.92 SCC (SQUAMOUS CELL CARCINOMA): ICD-10-CM

## 2022-07-13 DIAGNOSIS — C34.90 METASTATIC NON-SMALL CELL LUNG CANCER (HCC): ICD-10-CM

## 2022-07-13 DIAGNOSIS — J43.1 PANLOBULAR EMPHYSEMA (HCC): ICD-10-CM

## 2022-07-13 DIAGNOSIS — M79.604 PAIN IN BOTH LOWER EXTREMITIES: ICD-10-CM

## 2022-07-13 DIAGNOSIS — Z72.0 TOBACCO ABUSE: ICD-10-CM

## 2022-07-13 DIAGNOSIS — M79.89 LEG SWELLING: Primary | ICD-10-CM

## 2022-07-13 DIAGNOSIS — M79.605 PAIN IN BOTH LOWER EXTREMITIES: ICD-10-CM

## 2022-07-13 DIAGNOSIS — R22.41 MASS OF RIGHT LOWER EXTREMITY: ICD-10-CM

## 2022-07-13 DIAGNOSIS — R16.0 HEPATOMEGALY: ICD-10-CM

## 2022-07-13 DIAGNOSIS — R68.89 ABNORMAL FINDINGS ON AUSCULTATION: ICD-10-CM

## 2022-07-13 PROBLEM — D21.21: Status: ACTIVE | Noted: 2022-06-29

## 2022-07-13 PROCEDURE — 99215 OFFICE O/P EST HI 40 MIN: CPT | Performed by: INTERNAL MEDICINE

## 2022-07-13 PROCEDURE — 71046 X-RAY EXAM CHEST 2 VIEWS: CPT

## 2022-07-13 PROCEDURE — 99406 BEHAV CHNG SMOKING 3-10 MIN: CPT | Performed by: INTERNAL MEDICINE

## 2022-07-13 PROCEDURE — 1123F ACP DISCUSS/DSCN MKR DOCD: CPT | Performed by: INTERNAL MEDICINE

## 2022-07-13 RX ORDER — OXYCODONE HYDROCHLORIDE AND ACETAMINOPHEN 5; 325 MG/1; MG/1
1 TABLET ORAL EVERY 6 HOURS PRN
Qty: 28 TABLET | Refills: 0 | Status: ON HOLD
Start: 2022-07-13 | End: 2022-07-25 | Stop reason: HOSPADM

## 2022-07-13 RX ORDER — FUROSEMIDE 20 MG/1
20 TABLET ORAL DAILY
Qty: 7 TABLET | Refills: 0 | Status: SHIPPED | OUTPATIENT
Start: 2022-07-13 | End: 2022-08-12

## 2022-07-13 NOTE — LETTER
[facility]ICIANS [facility] Internal Medicine and Pediatrics  [address] [facility]  Phone: [phone]  Fax: [phone]    Emiliano Marroquin MD        July 13, 2022     Patient: Wesly Puente   YOB: 1955   Date of Visit: 7/13/2022       To Whom it May Concern:    Nicanor Leonardo was seen in my clinic on 7/13/2022. She may return to work on July 25,2022. If you have any questions or concerns, please don't hesitate to call.     Sincerely,         Emiliano Marroquin MD

## 2022-07-13 NOTE — PROGRESS NOTES
Chief Complaint   Patient presents with    Leg Swelling       HPI: Here to follow-up on leg swelling and pain, occurred upon the recommendation of her orthopedic surgeon, after biopsy at [facility] showed metastatic non-small cell carcinoma from the right distal lateral leg soft tissues. States the pain is all around bilateral legs from them being so tight and swollen. It is not localizing to the surgical site. Although there were multiple other exam findings today, see below, patient denies any nausea, change in appetite, abdominal pain, shortness of breath. She continues smoking but has been thinking about trying to quit since she got her pathology results. .    Medications reviewed and reconciled with what patient reports to be taking. /66   Pulse 91   Ht 5' 7\" (1.702 m)   Wt 146 lb 6 oz (66.4 kg)   SpO2 97%   BMI 22.93 kg/m²     Physical Exam GENERAL: alert, oriented x4, well-appearing in NAD, accompanied by granddaughter      Vitals reviewed from intake /66   Pulse 91   Ht 5' 7\" (1.702 m)   Wt 146 lb 6 oz (66.4 kg)   SpO2 97%   BMI 22.93 kg/m²     HEENT: normocephalic atraumatic clear conj/nares/op     NECK: supple without lymphadenopathy or thyromegaly, no bruit    COR: RRR no murmurs rubs or gallops    LUNGS:nonlabored, diminished air exchange on the right, prolonged expiratory phase    ABDOMEN: soft, nontender, normal bowel sounds, liver hard, irregular, and 6 cm below RCM    EXTREMITIES: warm, dry, well-perfused,tight swollen BLE right > left without erythema or skin breaks other than healing surgical site on  Lateral aspect distal RLE, no cords    DERM: no suspicious lesions, no rashes    NEURO: cranial nerves intact, normal speech and gait    SPINE: straight, supple, nontender without swelling    ASSESSMENT/PLAN:    1. Leg swelling  - Comprehensive Metabolic Panel;  Future  - CBC with Auto Differential; Future  - VL Extremity Venous Bilateral; Future  - furosemide (LASIX) 20 MG tablet; Take 1 tablet by mouth daily  Dispense: 7 tablet; Refill: 0    2. Pain in both lower extremities--excess swelling in setting of cancer, need to rule out DVT, but will also treat pain with intention that oncology would handle long term   - oxyCODONE-acetaminophen (PERCOCET) 5-325 MG per tablet; Take 1 tablet by mouth every 6 hours as needed for Pain for up to 7 days. Intended supply: 7 days. Take lowest dose possible to manage pain  Dispense: 28 tablet; Refill: 0    3. Mass of right lower extremity  - Comprehensive Metabolic Panel; Future  - CBC with Auto Differential; Future    4. Metastatic non-small cell lung cancer (HCC)--counseled patient on diagnosis from pathology report. Has [facility] first appt 7/21/22. Will try to facilitate workup.   - CT CHEST ABDOMEN PELVIS W WO CONTRAST; Future  - PET CT WHOLE BODY; Future  - Comprehensive Metabolic Panel; Future  - CBC with Auto Differential; Future  - VL Extremity Venous Bilateral; Future  - oxyCODONE-acetaminophen (PERCOCET) 5-325 MG per tablet; Take 1 tablet by mouth every 6 hours as needed for Pain for up to 7 days. Intended supply: 7 days. Take lowest dose possible to manage pain  Dispense: 28 tablet; Refill: 0  - XR CHEST (2 VW); Future    5. Hepatomegaly  - Comprehensive Metabolic Panel; Future  - CBC with Auto Differential; Future    6. Abnormal findings on auscultation    7. SCC (squamous cell carcinoma)    8. Panlobular emphysema ([address]  - CT CHEST ABDOMEN PELVIS W WO CONTRAST; Future  - XR CHEST (2 VW); Future    9. Tobacco abuse  - CT CHEST ABDOMEN PELVIS W WO CONTRAST; Future  - XR CHEST (2 VW); Future          Patient was counseled on tobacco cessation. Based upon patient's motivation to change her behavior, the following plan was agreed upon: patient will think about his/her triggers for using tobacco and patient will think about reasons why he/she should quit using tobacco.  She was provided with a list of local tobacco cessation resources. Provider spent 5 minutes counseling patient. -    Problem List Items Addressed This Visit     Panlobular emphysema ([facility]    Relevant Orders    CT CHEST ABDOMEN PELVIS W WO CONTRAST    XR CHEST (2 VW) (Completed)    Mass of right lower extremity    Relevant Orders    Comprehensive Metabolic Panel    CBC with Auto Differential    Metastatic non-small cell lung cancer ([facility]    Relevant Medications    oxyCODONE-acetaminophen (PERCOCET) 5-325 MG per tablet    Other Relevant Orders    CT CHEST ABDOMEN PELVIS W WO CONTRAST    PET CT WHOLE BODY    Comprehensive Metabolic Panel    CBC with Auto Differential    VL Extremity Venous Bilateral    XR CHEST (2 VW) (Completed)    SCC (squamous cell carcinoma)    Relevant Medications    oxyCODONE-acetaminophen (PERCOCET) 5-325 MG per tablet    Tobacco abuse    Relevant Orders    CT CHEST ABDOMEN PELVIS W WO CONTRAST    XR CHEST (2 VW) (Completed)      Other Visit Diagnoses     Leg swelling    -  Primary    Relevant Medications    furosemide (LASIX) 20 MG tablet    Other Relevant Orders    Comprehensive Metabolic Panel    CBC with Auto Differential    VL Extremity Venous Bilateral    Pain in both lower extremities        Relevant Medications    oxyCODONE-acetaminophen (PERCOCET) 5-325 MG per tablet    Other Relevant Orders    VL Extremity Venous Bilateral    Hepatomegaly        Relevant Orders    CT CHEST ABDOMEN PELVIS W WO CONTRAST    Comprehensive Metabolic Panel    CBC with Auto Differential    Abnormal findings on auscultation        Relevant Orders    CT CHEST ABDOMEN PELVIS W WO CONTRAST    XR CHEST (2 VW) (Completed)            No follow-ups on file. I have spent over 40 minutes with this patient and/or guardian. This included time spent on reviewing records, counseling and care coordination.

## 2022-07-13 NOTE — Clinical Note
PH[facility]ICIANS [facility] Internal Medicine and Pediatrics  [address] [facility]  Phone: [phone]  Fax: [phone]    Shubham Snyder MD        July 13, 2022     Patient: Jessi Wilks   YOB: 1955   Date of Visit: 7/13/2022       To Whom It May Concern: It is my medical opinion that Catana Barefoot {Work release (duty restriction):79120}. If you have any questions or concerns, please don't hesitate to call.     Sincerely,        Shubham Snyder MD

## 2022-07-14 ENCOUNTER — TELEPHONE (OUTPATIENT)
Dept: ADMINISTRATIVE | Age: 67
End: 2022-07-14

## 2022-07-14 DIAGNOSIS — C34.90 METASTATIC NON-SMALL CELL LUNG CANCER (HCC): ICD-10-CM

## 2022-07-14 DIAGNOSIS — R22.41 MASS OF RIGHT LOWER EXTREMITY: ICD-10-CM

## 2022-07-14 DIAGNOSIS — R16.0 HEPATOMEGALY: ICD-10-CM

## 2022-07-14 DIAGNOSIS — M79.89 LEG SWELLING: ICD-10-CM

## 2022-07-14 LAB
A/G RATIO: 1.4 (ref 1.1–2.2)
ALBUMIN SERPL-MCNC: 3.8 G/DL (ref 3.4–5)
ALP BLD-CCNC: 544 U/L (ref 40–129)
ALT SERPL-CCNC: 30 U/L (ref 10–40)
ANION GAP SERPL CALCULATED.3IONS-SCNC: 12 MMOL/L (ref 3–16)
AST SERPL-CCNC: 121 U/L (ref 15–37)
BASOPHILS ABSOLUTE: 0.1 K/UL (ref 0–0.2)
BASOPHILS RELATIVE PERCENT: 0.9 %
BILIRUB SERPL-MCNC: 1.2 MG/DL (ref 0–1)
BUN BLDV-MCNC: 10 MG/DL (ref 7–20)
CALCIUM SERPL-MCNC: 9.8 MG/DL (ref 8.3–10.6)
CHLORIDE BLD-SCNC: 92 MMOL/L (ref 99–110)
CO2: 29 MMOL/L (ref 21–32)
CREAT SERPL-MCNC: 0.5 MG/DL (ref 0.6–1.2)
EOSINOPHILS ABSOLUTE: 0 K/UL (ref 0–0.6)
EOSINOPHILS RELATIVE PERCENT: 0.4 %
GFR AFRICAN AMERICAN: >60
GFR NON-AFRICAN AMERICAN: >60
GLUCOSE BLD-MCNC: 102 MG/DL (ref 70–99)
HCT VFR BLD CALC: 37.9 % (ref 36–48)
HEMOGLOBIN: 12.6 G/DL (ref 12–16)
LYMPHOCYTES ABSOLUTE: 2.1 K/UL (ref 1–5.1)
LYMPHOCYTES RELATIVE PERCENT: 23.7 %
MCH RBC QN AUTO: 29.4 PG (ref 26–34)
MCHC RBC AUTO-ENTMCNC: 33.3 G/DL (ref 31–36)
MCV RBC AUTO: 88.4 FL (ref 80–100)
MONOCYTES ABSOLUTE: 0.7 K/UL (ref 0–1.3)
MONOCYTES RELATIVE PERCENT: 7.6 %
NEUTROPHILS ABSOLUTE: 6 K/UL (ref 1.7–7.7)
NEUTROPHILS RELATIVE PERCENT: 67.4 %
PDW BLD-RTO: 16.5 % (ref 12.4–15.4)
PLATELET # BLD: 433 K/UL (ref 135–450)
PMV BLD AUTO: 7.5 FL (ref 5–10.5)
POTASSIUM SERPL-SCNC: 4.7 MMOL/L (ref 3.5–5.1)
RBC # BLD: 4.29 M/UL (ref 4–5.2)
SODIUM BLD-SCNC: 133 MMOL/L (ref 136–145)
TOTAL PROTEIN: 6.6 G/DL (ref 6.4–8.2)
WBC # BLD: 8.8 K/UL (ref 4–11)

## 2022-07-14 NOTE — TELEPHONE ENCOUNTER
Submitted PA for oxyCODONE-Acetaminophen 5-325MG tablets, Key: [id number]. Medication has been APPROVED. Please notify patient. Thank you.

## 2022-07-18 ENCOUNTER — TELEPHONE (OUTPATIENT)
Dept: INTERNAL MEDICINE CLINIC | Age: 67
End: 2022-07-18

## 2022-07-18 NOTE — TELEPHONE ENCOUNTER
Patient  calling patient was seen by Dr. Violetta Jerez on 7/13/22. Patient legs are still swollen, no redness, painful. Patient is having stomach pain since last Thursday.  Last bowel movement 7/13/22     Please advise

## 2022-07-19 ENCOUNTER — APPOINTMENT (OUTPATIENT)
Dept: CT IMAGING | Age: 67
DRG: 166 | End: 2022-07-19
Payer: OTHER GOVERNMENT

## 2022-07-19 ENCOUNTER — TELEPHONE (OUTPATIENT)
Dept: INTERNAL MEDICINE CLINIC | Age: 67
End: 2022-07-19

## 2022-07-19 ENCOUNTER — HOSPITAL ENCOUNTER (INPATIENT)
Age: 67
LOS: 8 days | Discharge: HOME | DRG: 166 | End: 2022-07-27
Attending: EMERGENCY MEDICINE | Admitting: INTERNAL MEDICINE
Payer: OTHER GOVERNMENT

## 2022-07-19 ENCOUNTER — OFFICE VISIT (OUTPATIENT)
Dept: INTERNAL MEDICINE CLINIC | Age: 67
End: 2022-07-19
Payer: OTHER GOVERNMENT

## 2022-07-19 VITALS
BODY MASS INDEX: 22.55 KG/M2 | DIASTOLIC BLOOD PRESSURE: 80 MMHG | HEART RATE: 97 BPM | SYSTOLIC BLOOD PRESSURE: 128 MMHG | WEIGHT: 144 LBS | OXYGEN SATURATION: 96 %

## 2022-07-19 DIAGNOSIS — I82.220: ICD-10-CM

## 2022-07-19 DIAGNOSIS — I26.99 ACUTE PULMONARY EMBOLISM WITHOUT ACUTE COR PULMONALE, UNSPECIFIED PULMONARY EMBOLISM TYPE (HCC): Primary | ICD-10-CM

## 2022-07-19 DIAGNOSIS — M79.605 PAIN IN BOTH LOWER EXTREMITIES: ICD-10-CM

## 2022-07-19 DIAGNOSIS — C34.90 METASTATIC NON-SMALL CELL LUNG CANCER (HCC): ICD-10-CM

## 2022-07-19 DIAGNOSIS — M79.89 RIGHT LEG SWELLING: ICD-10-CM

## 2022-07-19 DIAGNOSIS — R10.11 RUQ PAIN: Primary | ICD-10-CM

## 2022-07-19 DIAGNOSIS — R16.0 HEPATOMEGALY: ICD-10-CM

## 2022-07-19 DIAGNOSIS — K59.00 OBSTIPATION: ICD-10-CM

## 2022-07-19 DIAGNOSIS — C34.90 NON-SMALL CELL LUNG CANCER WITH METASTASIS (HCC): ICD-10-CM

## 2022-07-19 DIAGNOSIS — M79.604 PAIN IN BOTH LOWER EXTREMITIES: ICD-10-CM

## 2022-07-19 LAB
A/G RATIO: 0.8 (ref 1.1–2.2)
ALBUMIN SERPL-MCNC: 3.3 G/DL (ref 3.4–5)
ALP BLD-CCNC: 890 U/L (ref 40–129)
ALT SERPL-CCNC: 38 U/L (ref 10–40)
ANION GAP SERPL CALCULATED.3IONS-SCNC: 13 MMOL/L (ref 3–16)
APTT: 28.2 SEC (ref 23–34.3)
AST SERPL-CCNC: 151 U/L (ref 15–37)
BASOPHILS ABSOLUTE: 0.1 K/UL (ref 0–0.2)
BASOPHILS RELATIVE PERCENT: 0.5 %
BILIRUB SERPL-MCNC: 1.1 MG/DL (ref 0–1)
BUN BLDV-MCNC: 14 MG/DL (ref 7–20)
CALCIUM SERPL-MCNC: 9.8 MG/DL (ref 8.3–10.6)
CHLORIDE BLD-SCNC: 83 MMOL/L (ref 99–110)
CO2: 28 MMOL/L (ref 21–32)
CREAT SERPL-MCNC: 0.6 MG/DL (ref 0.6–1.2)
EOSINOPHILS ABSOLUTE: 0 K/UL (ref 0–0.6)
EOSINOPHILS RELATIVE PERCENT: 0.1 %
GFR AFRICAN AMERICAN: >60
GFR NON-AFRICAN AMERICAN: >60
GLUCOSE BLD-MCNC: 113 MG/DL (ref 70–99)
HCT VFR BLD CALC: 40.1 % (ref 36–48)
HEMOGLOBIN: 13.2 G/DL (ref 12–16)
INR BLD: 1.09 (ref 0.87–1.14)
LIPASE: 17 U/L (ref 13–60)
LYMPHOCYTES ABSOLUTE: 2.1 K/UL (ref 1–5.1)
LYMPHOCYTES RELATIVE PERCENT: 19.9 %
MCH RBC QN AUTO: 29 PG (ref 26–34)
MCHC RBC AUTO-ENTMCNC: 32.9 G/DL (ref 31–36)
MCV RBC AUTO: 88.1 FL (ref 80–100)
MONOCYTES ABSOLUTE: 0.9 K/UL (ref 0–1.3)
MONOCYTES RELATIVE PERCENT: 9.2 %
NEUTROPHILS ABSOLUTE: 7.3 K/UL (ref 1.7–7.7)
NEUTROPHILS RELATIVE PERCENT: 70.3 %
PDW BLD-RTO: 16.5 % (ref 12.4–15.4)
PLATELET # BLD: 429 K/UL (ref 135–450)
PMV BLD AUTO: 7.4 FL (ref 5–10.5)
POTASSIUM REFLEX MAGNESIUM: 5.2 MMOL/L (ref 3.5–5.1)
PROTHROMBIN TIME: 14 SEC (ref 11.7–14.5)
RBC # BLD: 4.55 M/UL (ref 4–5.2)
SODIUM BLD-SCNC: 124 MMOL/L (ref 136–145)
TOTAL PROTEIN: 7.2 G/DL (ref 6.4–8.2)
WBC # BLD: 10.3 K/UL (ref 4–11)

## 2022-07-19 PROCEDURE — 2580000003 HC RX 258: Performed by: GENERAL ACUTE CARE HOSPITAL

## 2022-07-19 PROCEDURE — 2500000003 HC RX 250 WO HCPCS: Performed by: GENERAL ACUTE CARE HOSPITAL

## 2022-07-19 PROCEDURE — 1123F ACP DISCUSS/DSCN MKR DOCD: CPT | Performed by: INTERNAL MEDICINE

## 2022-07-19 PROCEDURE — 85610 PROTHROMBIN TIME: CPT

## 2022-07-19 PROCEDURE — 96374 THER/PROPH/DIAG INJ IV PUSH: CPT

## 2022-07-19 PROCEDURE — 71260 CT THORAX DX C+: CPT

## 2022-07-19 PROCEDURE — 36415 COLL VENOUS BLD VENIPUNCTURE: CPT

## 2022-07-19 PROCEDURE — 85730 THROMBOPLASTIN TIME PARTIAL: CPT

## 2022-07-19 PROCEDURE — 6370000000 HC RX 637 (ALT 250 FOR IP): Performed by: NURSE PRACTITIONER

## 2022-07-19 PROCEDURE — 99285 EMERGENCY DEPT VISIT HI MDM: CPT

## 2022-07-19 PROCEDURE — 1200000000 HC SEMI PRIVATE

## 2022-07-19 PROCEDURE — 6360000004 HC RX CONTRAST MEDICATION: Performed by: GENERAL ACUTE CARE HOSPITAL

## 2022-07-19 PROCEDURE — 96375 TX/PRO/DX INJ NEW DRUG ADDON: CPT

## 2022-07-19 PROCEDURE — 80053 COMPREHEN METABOLIC PANEL: CPT

## 2022-07-19 PROCEDURE — 85025 COMPLETE CBC W/AUTO DIFF WBC: CPT

## 2022-07-19 PROCEDURE — 83690 ASSAY OF LIPASE: CPT

## 2022-07-19 PROCEDURE — 99214 OFFICE O/P EST MOD 30 MIN: CPT | Performed by: INTERNAL MEDICINE

## 2022-07-19 PROCEDURE — 6360000002 HC RX W HCPCS: Performed by: GENERAL ACUTE CARE HOSPITAL

## 2022-07-19 RX ORDER — OXYCODONE HYDROCHLORIDE AND ACETAMINOPHEN 5; 325 MG/1; MG/1
1 TABLET ORAL EVERY 4 HOURS PRN
Status: DISCONTINUED | OUTPATIENT
Start: 2022-07-19 | End: 2022-07-27 | Stop reason: HOSPADM

## 2022-07-19 RX ORDER — OXYCODONE HYDROCHLORIDE AND ACETAMINOPHEN 5; 325 MG/1; MG/1
2 TABLET ORAL EVERY 4 HOURS PRN
Status: DISCONTINUED | OUTPATIENT
Start: 2022-07-19 | End: 2022-07-27 | Stop reason: HOSPADM

## 2022-07-19 RX ORDER — HEPARIN SODIUM 1000 [USP'U]/ML
5300 INJECTION, SOLUTION INTRAVENOUS; SUBCUTANEOUS ONCE
Status: COMPLETED | OUTPATIENT
Start: 2022-07-19 | End: 2022-07-19

## 2022-07-19 RX ORDER — SODIUM CHLORIDE 0.9 % (FLUSH) 0.9 %
5-40 SYRINGE (ML) INJECTION EVERY 12 HOURS SCHEDULED
Status: DISCONTINUED | OUTPATIENT
Start: 2022-07-20 | End: 2022-07-27 | Stop reason: HOSPADM

## 2022-07-19 RX ORDER — ACETAMINOPHEN 325 MG/1
650 TABLET ORAL EVERY 6 HOURS PRN
Status: DISCONTINUED | OUTPATIENT
Start: 2022-07-19 | End: 2022-07-27 | Stop reason: HOSPADM

## 2022-07-19 RX ORDER — LANOLIN ALCOHOL/MO/W.PET/CERES
10 CREAM (GRAM) TOPICAL NIGHTLY
Status: DISCONTINUED | OUTPATIENT
Start: 2022-07-20 | End: 2022-07-27 | Stop reason: HOSPADM

## 2022-07-19 RX ORDER — PHENOL 1.4 %
10 AEROSOL, SPRAY (ML) MUCOUS MEMBRANE NIGHTLY
Status: ON HOLD | COMMUNITY
End: 2022-08-16 | Stop reason: HOSPADM

## 2022-07-19 RX ORDER — MORPHINE SULFATE 4 MG/ML
4 INJECTION, SOLUTION INTRAMUSCULAR; INTRAVENOUS
Status: DISCONTINUED | OUTPATIENT
Start: 2022-07-19 | End: 2022-07-27 | Stop reason: HOSPADM

## 2022-07-19 RX ORDER — 0.9 % SODIUM CHLORIDE 0.9 %
1000 INTRAVENOUS SOLUTION INTRAVENOUS ONCE
Status: COMPLETED | OUTPATIENT
Start: 2022-07-19 | End: 2022-07-19

## 2022-07-19 RX ORDER — ONDANSETRON 4 MG/1
4 TABLET, ORALLY DISINTEGRATING ORAL EVERY 8 HOURS PRN
Status: DISCONTINUED | OUTPATIENT
Start: 2022-07-19 | End: 2022-07-27 | Stop reason: HOSPADM

## 2022-07-19 RX ORDER — OXYCODONE HYDROCHLORIDE 5 MG/1
5 TABLET ORAL ONCE
Status: DISCONTINUED | OUTPATIENT
Start: 2022-07-19 | End: 2022-07-19

## 2022-07-19 RX ORDER — HEPARIN SODIUM 10000 [USP'U]/100ML
0-3000 INJECTION, SOLUTION INTRAVENOUS CONTINUOUS
Status: DISCONTINUED | OUTPATIENT
Start: 2022-07-19 | End: 2022-07-21

## 2022-07-19 RX ORDER — ONDANSETRON 2 MG/ML
4 INJECTION INTRAMUSCULAR; INTRAVENOUS EVERY 6 HOURS PRN
Status: DISCONTINUED | OUTPATIENT
Start: 2022-07-19 | End: 2022-07-27 | Stop reason: HOSPADM

## 2022-07-19 RX ORDER — HEPARIN SODIUM 1000 [USP'U]/ML
40 INJECTION, SOLUTION INTRAVENOUS; SUBCUTANEOUS PRN
Status: DISCONTINUED | OUTPATIENT
Start: 2022-07-19 | End: 2022-07-19

## 2022-07-19 RX ORDER — ACETAMINOPHEN 650 MG/1
650 SUPPOSITORY RECTAL EVERY 6 HOURS PRN
Status: DISCONTINUED | OUTPATIENT
Start: 2022-07-19 | End: 2022-07-27 | Stop reason: HOSPADM

## 2022-07-19 RX ORDER — HEPARIN SODIUM 1000 [USP'U]/ML
80 INJECTION, SOLUTION INTRAVENOUS; SUBCUTANEOUS PRN
Status: DISCONTINUED | OUTPATIENT
Start: 2022-07-19 | End: 2022-07-19

## 2022-07-19 RX ORDER — SODIUM CHLORIDE 9 MG/ML
INJECTION, SOLUTION INTRAVENOUS PRN
Status: DISCONTINUED | OUTPATIENT
Start: 2022-07-19 | End: 2022-07-27 | Stop reason: HOSPADM

## 2022-07-19 RX ORDER — SODIUM CHLORIDE 0.9 % (FLUSH) 0.9 %
5-40 SYRINGE (ML) INJECTION PRN
Status: DISCONTINUED | OUTPATIENT
Start: 2022-07-19 | End: 2022-07-27 | Stop reason: HOSPADM

## 2022-07-19 RX ORDER — ONDANSETRON 2 MG/ML
4 INJECTION INTRAMUSCULAR; INTRAVENOUS ONCE
Status: COMPLETED | OUTPATIENT
Start: 2022-07-19 | End: 2022-07-19

## 2022-07-19 RX ORDER — MORPHINE SULFATE 2 MG/ML
2 INJECTION, SOLUTION INTRAMUSCULAR; INTRAVENOUS
Status: DISCONTINUED | OUTPATIENT
Start: 2022-07-19 | End: 2022-07-27 | Stop reason: HOSPADM

## 2022-07-19 RX ORDER — MORPHINE SULFATE 4 MG/ML
4 INJECTION, SOLUTION INTRAMUSCULAR; INTRAVENOUS ONCE
Status: COMPLETED | OUTPATIENT
Start: 2022-07-19 | End: 2022-07-19

## 2022-07-19 RX ORDER — POLYETHYLENE GLYCOL 3350 17 G/17G
17 POWDER, FOR SOLUTION ORAL DAILY PRN
Status: DISCONTINUED | OUTPATIENT
Start: 2022-07-19 | End: 2022-07-25

## 2022-07-19 RX ADMIN — MORPHINE SULFATE 4 MG: 4 INJECTION, SOLUTION INTRAMUSCULAR; INTRAVENOUS at 16:18

## 2022-07-19 RX ADMIN — SODIUM CHLORIDE 1000 ML: 9 INJECTION, SOLUTION INTRAVENOUS at 16:18

## 2022-07-19 RX ADMIN — IOPAMIDOL 75 ML: 755 INJECTION, SOLUTION INTRAVENOUS at 16:29

## 2022-07-19 RX ADMIN — HEPARIN SODIUM 5300 UNITS: 1000 INJECTION INTRAVENOUS; SUBCUTANEOUS at 18:50

## 2022-07-19 RX ADMIN — HEPARIN SODIUM 1210 UNITS/HR: 10000 INJECTION, SOLUTION INTRAVENOUS at 18:52

## 2022-07-19 RX ADMIN — OXYCODONE AND ACETAMINOPHEN 2 TABLET: 5; 325 TABLET ORAL at 21:06

## 2022-07-19 RX ADMIN — ONDANSETRON 4 MG: 2 INJECTION INTRAMUSCULAR; INTRAVENOUS at 16:18

## 2022-07-19 ASSESSMENT — PAIN SCALES - GENERAL
PAINLEVEL_OUTOF10: 6
PAINLEVEL_OUTOF10: 8
PAINLEVEL_OUTOF10: 8

## 2022-07-19 ASSESSMENT — PAIN DESCRIPTION - PAIN TYPE: TYPE: ACUTE PAIN

## 2022-07-19 ASSESSMENT — ENCOUNTER SYMPTOMS
ABDOMINAL DISTENTION: 1
SHORTNESS OF BREATH: 0
COUGH: 0
VOMITING: 0
PHOTOPHOBIA: 0
WHEEZING: 0
CONSTIPATION: 1
DIARRHEA: 0
CHEST TIGHTNESS: 0
NAUSEA: 1
BLOOD IN STOOL: 0
SORE THROAT: 0
ABDOMINAL PAIN: 1
BACK PAIN: 1

## 2022-07-19 ASSESSMENT — PAIN DESCRIPTION - DESCRIPTORS: DESCRIPTORS: DISCOMFORT

## 2022-07-19 ASSESSMENT — PAIN DESCRIPTION - LOCATION: LOCATION: ABDOMEN

## 2022-07-19 ASSESSMENT — PAIN - FUNCTIONAL ASSESSMENT
PAIN_FUNCTIONAL_ASSESSMENT: PREVENTS OR INTERFERES SOME ACTIVE ACTIVITIES AND ADLS
PAIN_FUNCTIONAL_ASSESSMENT: 0-10

## 2022-07-19 NOTE — PROGRESS NOTES
Pharmacy Medication Reconciliation Note     List of medications Meggan Sosa is currently taking is complete. Source of information:   1. Conversation with patient at bedside  2. EMR    Notes regarding home medications:   1. Patient reports taking only 0.5 tabs of her Perc 5/325 PTA in the ED. Requests this medication as she is quite uncomfortable  2.  Reports taking 2 doxylamine and a melatonin QHS    Patient denies taking any OTC or herbal medications other than those listed    Richy White, Pharmacy Intern  7/19/2022  7:03 PM

## 2022-07-19 NOTE — PROGRESS NOTES
Clinical Pharmacy Note  Heparin Dosing Consult    Max Ying is a 74 y.o. female ordered heparin per high dose nomogram by Dr Derrek Kumar    Lab Results   Component Value Date/Time    APTT 28.2 07/19/2022 02:38 PM     Lab Results   Component Value Date/Time    HGB 13.2 07/19/2022 02:38 PM    HCT 40.1 07/19/2022 02:38 PM     07/19/2022 02:38 PM    INR 1.09 07/19/2022 02:38 PM       Ht Readings from Last 1 Encounters:   07/19/22 5' 7\" (1.702 m)        Wt Readings from Last 1 Encounters:   07/19/22 147 lb 1.8 oz (66.7 kg)        Assessment/Plan:  Initial bolus: 5300 units  Initial infusion rate: 1210 units/hr  Next aPTT: 07/20/22 0100     Pharmacy will continue to monitor adjust heparin based on aPTT results using nomogram below:     VTE/DVT/PE Heparin Nomogram     Initial Bolus: 80 units/kg Max Bolus: 10,000 units       Initial Rate: 18 units/kg/hr Max Initial Rate: 2,750 units/hr     aPTT < 59    Heparin 80 units/kg bolus Increase infusion by 4 units/kg/hr        (maximum 10,000 units)   aPTT 59-72.9    Heparin 40 units/kg bolus Increase infusion by 2 units/kg/hr        (maximum 5,000 units)   aPTT      No bolus   No change   aPTT 102.1-109  No bolus   Decrease infusion by 1 units/kg/hr   aPTT 109.1-122.9   No bolus   Decrease infusion by 2 units/kg/hr   aPTT > 123     Hold heparin for 1 hour Decrease infusion by 3 units/kg/hr    Obtain aPTT 6 hours after initial bolus and 6 hours after any dose change until two consecutive therapeutic aPTTs are achieved - then daily.

## 2022-07-19 NOTE — H&P
Hospital Medicine History & Physical      PCP: Dax Huddleston MD    Date of Admission: 7/19/2022    Date of Service: Pt seen/examined on 7/19/2022 and Admitted to Inpatient with expected LOS greater than two midnights due to medical therapy. Chief Complaint:  severe RUQ pain      History Of Present Illness:      74 y.o. female with recent diagnosis of metastatic non small cell lung cancer otherwise no PMHx presented to [facility] with right upper quadrant abdominal pain. Sent in by her PCP. Patient reports pain for the last week. Constant and worsening. No nausea or vomiting. Last bowel movement 6 days ago. Patient had localized area of swelling initially in her right lower leg back in February. She had work-up done by vascular surgery which was unremarkable for vascular findings so  she was referred to orthopedic oncologist and pathology revealed small cell carcinoma. She has been referred to hematology oncology and is awaiting consultation. CT chest/abdomen/pelvis with contrast in the ED revealed large left upper lobe mass measuring 6.0 x 4.2 cm consistent with known malignancy. There is a large filling defect in the right interlobar pulmonary artery consistent with acute pulmonary embolism. There may be smaller filling defects in the branch vessels. No definite right heart strain. There is a 1.0 x 0.8 cm nodule in the right lower lobe could also be malignant. Additional scattered tiny nodules. There is diffuse hepatic metastatic disease with tumor thrombus extending into the intrahepatic IVC and common iliac veins with possible extension into the external iliac veins but no extension into the renal veins. There is prominent retrocrural/right upper quadrant and retroperitoneal lymph nodes also suspicious for metastatic disease. ED started the patient on heparin for PE and IVC clot. Heme-onc was consulted and results were discussed in detail by ED provider.     Past Medical History:          Diagnosis Date    Headache        Past Surgical History:          Procedure Laterality Date    APPENDECTOMY      TONSILLECTOMY         Medications Prior to Admission:      Prior to Admission medications    Medication Sig Start Date End Date Taking? Authorizing Provider   doxyLAMINE succinate ([facility] SLEEP[facility], DOXYLAMINE,) 25 MG tablet Take 50 mg by mouth nightly   Yes Historical Provider, MD   Melatonin 10 MG TABS Take 10 mg by mouth at bedtime   Yes Historical Provider, MD   oxyCODONE-acetaminophen (PERCOCET) 5-325 MG per tablet Take 1 tablet by mouth every 6 hours as needed for Pain for up to 7 days. Intended supply: 7 days. Take lowest dose possible to manage pain 7/13/22 7/20/22  Rohit Rosales MD   furosemide (LASIX) 20 MG tablet Take 1 tablet by mouth daily 7/13/22   Rohit Rosales MD   SUMAtriptan (IMITREX) 100 MG tablet Take 1 tablet by mouth daily as needed for Migraine 6/23/22   Rohit Rosales MD       Allergies:  Patient has no known allergies. Social History:      The patient currently lives home with     TOBACCO:   reports that she has been smoking cigarettes. She has a 50.00 pack-year smoking history. She has never used smokeless tobacco.  ETOH:   reports no history of alcohol use. Family History:      Reviewed in detail positive as follows:        Problem Relation Age of Onset    Arthritis Mother     High Blood Pressure Mother        REVIEW OF SYSTEMS:   Pertinent positives as noted in the HPI. All other systems reviewed and negative. PHYSICAL EXAM PERFORMED:    /66   Pulse 93   Temp 97.9 °F (36.6 °C) (Oral)   Resp 24   Ht 5' 7\" (1.702 m)   Wt 147 lb 1.8 oz (66.7 kg)   SpO2 98%   BMI 23.04 kg/m²     General appearance:  Well developed, well nourished,  female sitting upright on ED cart, uncomfortable in appearance but in no apparent distress, appears stated age and cooperative.   HEENT:  Normal cephalic, atraumatic without obvious deformity. Pupils equal, round, and reactive to light. Conjunctivae/corneas clear. Neck: Supple, with full range of motion. No jugular venous distention. Trachea midline. Respiratory:  Normal respiratory effort. Scattered rhonchi bilaterally without accessory muscle use. Cardiovascular:  Regular rate and rhythm without murmurs, 2+ bilateral lower extremity pitting edema. Abdomen: Soft, tenderness to right upper quadrant, non-distended, without rebound or guarding. Normal bowel sounds. Musculoskeletal: Pain noted to right lower extremity, there is warmth noted to proximal anterior/medial tib-fib region. Surgical incision from biopsy right lateral distal tib-fib with wound edges well approximated and without erythema. Otherwise moves all extremities equally. Full range of motion without deformity. Skin: As noted above otherwise skin warm, dry and intact. No rashes or lesions. Neurologic:  Neurovascularly intact without any focal sensory/motor deficits. Cranial nerves: II-XII intact, grossly non-focal.  Psychiatric:  Alert and oriented, thought content appropriate, normal insight  Capillary Refill: Brisk,< 3 seconds   Peripheral Pulses: +2 palpable, equal bilaterally       Labs:     Recent Labs     07/19/22  1438   WBC 10.3   HGB 13.2   HCT 40.1        Recent Labs     07/19/22  1438   *   K 5.2*   CL 83*   CO2 28   BUN 14   CREATININE 0.6   CALCIUM 9.8     Recent Labs     07/19/22  1438   *   ALT 38   BILITOT 1.1*   ALKPHOS 890*     Recent Labs     07/19/22  1438   INR 1.09     No results for input(s): Danielle Jiménez in the last 72 hours.     Urinalysis:      Lab Results   Component Value Date/Time    NITRU POSITIVE 11/07/2019 04:45 PM    WBCUA  11/07/2019 04:45 PM    BACTERIA Rare 11/07/2019 04:45 PM    RBCUA 20-50 11/07/2019 04:45 PM    BLOODU LARGE 11/07/2019 04:45 PM    SPECGRAV 1.020 11/07/2019 04:45 PM    GLUCOSEU 100 11/07/2019 04:45 PM       Radiology:     CT CHEST ABDOMEN PELVIS W CONTRAST   Final Result   1. Large left upper lobe mass abutting the fissure and extending to the hilum   measuring 6.0 x 4.2 cm is consistent with known malignancy. 2. Large filling defect in the right interlobar pulmonary artery consistent   with acute pulmonary embolism and there may be smaller filling defects in   branch vessels. No definite right heart strain. 3. Moderate emphysema. A nodule in the medial right lower lobe measuring 1.0   x 0.8 cm could also be malignant and there are additional scattered tiny   nodules. 4. Diffuse hepatic metastatic disease. 5. Tumor thrombus extends into the intrahepatic IVC and common iliac veins   with possible extension into the external iliac veins but no definite   extension into the renal veins. 6. Prominent retrocrural, right upper quadrant, and retroperitoneal lymph   nodes also suspicious for metastatic disease. Findings were given to JAMEY Valencia in the ED on 7/19/2022 at 5:38 p.m. ASSESSMENT:    Active Hospital Problems    Diagnosis Date Noted    Acute pulmonary embolism without acute cor pulmonale, unspecified pulmonary embolism type ([address] [I26.99] 07/19/2022     Priority: Medium    Metastatic non-small cell lung cancer ([address] [C34.90] 07/13/2022     Priority: Medium         PLAN:    Acute Pulmonary Embolism with thromboembolism of IVC  - CT chest: Large left upper lobe mass abutting the fissure and extending to the hilum measuring 6.0 x 4.2 cm consistent with known malignancy. There is a large filling defect in the right interlobar pulmonary artery consistent with acute pulmonary embolism with possibility of smaller filling defects in the branch vessels. No definitive heart strain.   There is tumor thrombus extending into the intrahepatic IVC and common iliac veins  - Continue heparin gtt  - Echo - assess for heart strain  - bilateral lower ext doppler    Non-small cell lung cancer with mets  - newly dx, awaiting consult with oncology  - CT showing multiple lung nodules, diffuse hepatic metastatic disease with tumor thrombus extending into intrahepatic IVC and common iliac veins with possible extension into external iliac veins - no definite extension into renal veins. Prominent retrocrural, right upper quadrant and retroperitoneal lymph nodes suspicious for metastatic disease  - on heparin gtt  - [facility] consulted in ED      DVT Prophylaxis: Heparin gtt  Diet: Diet NPO  Code Status: No Order    Dispo - Inpatient       [address], APRN - CNP    Thank you Gorge Leonard MD for the opportunity to be involved in this patient's care. If you have any questions or concerns please feel free to contact me at [phone].

## 2022-07-19 NOTE — ED PROVIDER NOTES
Attending Supervisory Note/Shared Visit   I have personally performed a face to face diagnostic evaluation on this patient. I have reviewed the mid-levels findings and agree. History and Exam by me shows an alert white female in no acute distress. Sent from her primary care physician's office for right upper quadrant abdominal pain. Been ongoing for a week. This patient actually had some swelling in her right lower leg back in February. She has had a work-up done including evaluation by vascular. It was felt not to be a vascular problem. She subsequently saw an orthopedic oncologist for a biopsy. That biopsy was done several weeks ago. 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 She has been referred to oncology but has not had an appointment yet. She was seen in the office today and was having significant right upper quadrant abdominal pain. She is not having any chest pain. She is not having shortness of breath. General: Alert thin white female in no acute distress. Heart: Regular rate and rhythm. No murmurs or gallops noted. Lungs: Breath sounds equal bilaterally and clear. Abdomen: Soft, nondistended, her liver extends approximately 4 to 5 cm below the right costal margin and into the epigastrium. It is tender. Bowel sounds are normal.    Lab reviewed. H&H of 13.2 and 40.1. White blood cell count 10,300. Sodium 124 with a potassium of 5.2. BUN of 14 with a creatinine of 0.6. Alk phos of 890. AST of 151. Bilirubin of 1.1. Lipase of 17. PT and INR normal.  PTT is normal.    CT chest abdomen pelvis with contrast: Large left upper lobe mass measuring 6.0 x 4.2 cm consistent with known malignancy. Large filling defect in the right interlobar pulmonary artery consistent with an acute pulmonary embolism. There may be smaller filling defects in the branch vessels. No definite right heart strain. Moderate emphysema.   A 1.0 x

## 2022-07-19 NOTE — PROGRESS NOTES
Chief Complaint   Patient presents with    Abdominal Pain     Hasn't pooped since weds. HPI: Pt brought in same day tdue to abdominal pain and no bowel movement x 7 days. She is still awaiting further workup on her newly dx metastatic cancer, testing and onc appointments couldn't be scheduled until next week. Medications reviewed and reconciled with what patient reports to be taking. /80   Pulse 97   Wt 144 lb (65.3 kg)   SpO2 96%   BMI 22.55 kg/m²     Physical Exam walking cautiously and hunched forward, appears very uncomfortable and significantly declined since last visit  Cor RRR  Lungs diminished but symmetric  Abdomen--protruberant, tender RUQ with enlarged lirregular liver    ASSESSMENT/PLAN: Pt received counseling and, if relevant, printed instructions for all symptoms listed in CC and HPI, as well as for all diagnoses listed below. Pt is in severe pain and outpatient workup too slow, needs emergent evaluation now and pain control. REferred to ER, report called to Dr Rodrigo Blandon. 1. RUQ pain    2. Right leg swelling    3. Obstipation    4. Hepatomegaly    5. Metastatic non-small cell lung cancer ([address]    Problem List Items Addressed This Visit       Metastatic non-small cell lung cancer ([address]     Other Visit Diagnoses       RUQ pain    -  Primary    Right leg swelling        Obstipation        Hepatomegaly                  No follow-ups on file. --TBD

## 2022-07-19 NOTE — ED PROVIDER NOTES
[facility]        Pt Name: Marylou Alvarez  MRN: [id number]  [place] 1955  Date of evaluation: 7/19/2022  Provider: LISET Cobb - JAMEY  PCP: Chacorta Sears MD  Note Started: 7:50 PM EDT        I have seen and evaluated this patient with my supervising physician Rachel Christensen MD.    [address] [place] [address]       Chief Complaint   Patient presents with    Abdominal Pain     Patient sent by doctor due to abdominal pain \"liver pain\" patient reports continued pain for 1 week. Patient reports right leg tumor and swelling bilateral.        HISTORY OF PRESENT ILLNESS   (Location, Timing/Onset, Context/Setting, Quality, Duration, Modifying Factors, Severity, Associated Signs and Symptoms)  Note limiting factors. Chief Complaint: \"My liver hurts\"    Marylou Alvarez is a 74 y.o. female who presents to the emergency department today reporting severe right upper quadrant abdominal pain. Patient states that symptoms been present for the past week. Patient reports recently being diagnosed with metastatic lung cancer. She has not establish care with an oncologist as of yet. Patient was seen by her PCP this morning and was advised to come to the emergency department for further evaluation and treatment. Patient reports mild nausea but no vomiting. She denies having any diarrhea. She states that her abdomen feels \"bloated\". She denies having any chest pain or shortness of breath. She denies any urinary symptoms. She currently reports a pain level of 8 out of 10. She describes the pain as constant and dull with intermittent cramp-like sensations. The pain is nonmigratory. Patient states that she has taken her prescribed Norco with little relief of her symptoms. There has been no fever, chills, or other symptoms. Nursing Notes were all reviewed and agreed with or any disagreements were addressed in the HPI.     REVIEW OF SYSTEMS    (2-9 systems for level 4, 10 or more for level 5)     Review of Systems   Constitutional:  Positive for fatigue. Negative for activity change, chills, fever and unexpected weight change. HENT:  Negative for congestion and sore throat. Eyes:  Negative for photophobia and visual disturbance. Respiratory:  Negative for cough, chest tightness, shortness of breath and wheezing. Cardiovascular:  Positive for leg swelling. Negative for chest pain and palpitations. Gastrointestinal:  Positive for abdominal distention, abdominal pain, constipation and nausea. Negative for blood in stool, diarrhea and vomiting. Endocrine: Negative for polydipsia and polyuria. Genitourinary:  Negative for difficulty urinating, dysuria and flank pain. Musculoskeletal:  Positive for back pain and myalgias. Negative for gait problem, neck pain and neck stiffness. Skin:  Negative for rash and wound. Allergic/Immunologic: Negative for immunocompromised state. Neurological:  Negative for dizziness, speech difficulty, weakness, light-headedness and headaches. Hematological:  Does not bruise/bleed easily. Psychiatric/Behavioral:  Negative for suicidal ideas. Positives and Pertinent negatives as per HPI. Except as noted above in the ROS, all other systems were reviewed and negative. PAST MEDICAL HISTORY     Past Medical History:   Diagnosis Date    Headache          SURGICAL HISTORY     Past Surgical History:   Procedure Laterality Date    APPENDECTOMY      TONSILLECTOMY           CURRENTMEDICATIONS       Previous Medications    DOXYLAMINE SUCCINATE ([employer] SLEEP-AID, DOXYLAMINE,) 25 MG TABLET    Take 50 mg by mouth nightly    FUROSEMIDE (LASIX) 20 MG TABLET    Take 1 tablet by mouth daily    MELATONIN 10 MG TABS    Take 10 mg by mouth at bedtime    OXYCODONE-ACETAMINOPHEN (PERCOCET) 5-325 MG PER TABLET    Take 1 tablet by mouth every 6 hours as needed for Pain for up to 7 days. Intended supply: 7 days. General: Bowel sounds are normal.      Palpations: Abdomen is soft. Tenderness: There is abdominal tenderness. There is right CVA tenderness and guarding. There is no left CVA tenderness. Musculoskeletal:      Cervical back: Normal range of motion and neck supple. No rigidity or tenderness. Right lower leg: Edema present. Left lower leg: No edema. Legs:       Comments: Localized mass, mild tenderness, no erythema, warmth, drainage   Skin:     General: Skin is warm and dry. Capillary Refill: Capillary refill takes less than 2 seconds. Neurological:      General: No focal deficit present. Mental Status: She is alert and oriented to person, place, and time. Psychiatric:         Mood and Affect: Mood normal.         Behavior: Behavior normal.         Thought Content: Thought content normal.         Judgment: Judgment normal.       DIAGNOSTIC RESULTS   LABS:    Labs Reviewed   CBC WITH AUTO DIFFERENTIAL - Abnormal; Notable for the following components:       Result Value    RDW 16.5 (*)     All other components within normal limits   COMPREHENSIVE METABOLIC PANEL W/ REFLEX TO MG FOR LOW K - Abnormal; Notable for the following components:    Sodium 124 (*)     Potassium reflex Magnesium 5.2 (*)     Chloride 83 (*)     Glucose 113 (*)     Albumin 3.3 (*)     Albumin/Globulin Ratio 0.8 (*)     Total Bilirubin 1.1 (*)     Alkaline Phosphatase 890 (*)      (*)     All other components within normal limits   LIPASE   PROTIME-INR   APTT   URINALYSIS WITH REFLEX TO CULTURE   APTT       When ordered only abnormal lab results are displayed. All other labs were within normal range or not returned as of this dictation. EKG: When ordered, EKG's are interpreted by the Emergency Department Physician in the absence of a cardiologist.  Please see their note for interpretation of EKG.     RADIOLOGY:   Non-plain film images such as CT, Ultrasound and MRI are read by the radiologist. Ilene Martinez radiographic images are visualized and preliminarily interpreted by the ED Provider with the below findings:        Interpretation per the Radiologist below, if available at the time of this note:    CT CHEST [address]   Final Result   1. Large left upper lobe mass abutting the fissure and extending to the hilum   measuring 6.0 x 4.2 cm is consistent with known malignancy. 2. Large filling defect in the right interlobar pulmonary artery consistent   with acute pulmonary embolism and there may be smaller filling defects in   branch vessels. No definite right heart strain. 3. Moderate emphysema. A nodule in the medial right lower lobe measuring 1.0   x 0.8 cm could also be malignant and there are additional scattered tiny   nodules. 4. Diffuse hepatic metastatic disease. 5. Tumor thrombus extends into the intrahepatic IVC and common iliac veins   with possible extension into the external iliac veins but no definite   extension into the renal veins. 6. Prominent retrocrural, right upper quadrant, and retroperitoneal lymph   nodes also suspicious for metastatic disease. Findings were given to JAMEY Damon in the ED on 7/19/2022 at 5:38 p.m. XR CHEST (2 VW)    Result Date: 7/14/2022  EXAMINATION: TWO XRAY VIEWS OF THE CHEST 7/13/2022 4:37 pm COMPARISON: None. HISTORY: ORDERING SYSTEM PROVIDED HISTORY: Panlobular emphysema ([address] TECHNOLOGIST PROVIDED HISTORY: Reason for Exam: Panlobular emphysema ([address] J43.1 (ICD-10-CM); Tobacco abuse Z72.0 (ICD-10-CM); Metastatic non-small cell lung cancer FINDINGS: The cardiomediastinal silhouette is normal in size and contour. The lungs are hyperinflated with chronic emphysematous change. There is a 4.3 cm left upper lung parenchymal opacity identified worrisome for possible mass. No pleural effusion or pneumothorax is present. Emphysematous changes with left upper lung opacity worrisome for underlying mass .  Consider further evaluation with CT imaging of the chest. The findings were sent to the Radiology Results [id number] at 1:48 am on 7/14/2022 to be communicated to a licensed caregiver. CT CHEST ABDOMEN PELVIS W CONTRAST    Result Date: 7/19/2022  EXAMINATION: CT OF THE CHEST, ABDOMEN, AND PELVIS WITH CONTRAST 7/19/2022 4:04 pm TECHNIQUE: CT of the chest, abdomen and pelvis was performed with the administration of 75 mL Isovue 370 intravenous contrast. Multiplanar reformatted images are provided for review. Automated exposure control, iterative reconstruction, and/or weight based adjustment of the mA/kV was utilized to reduce the radiation dose to as low as reasonably achievable. COMPARISON: Chest radiograph 07/13/2022 HISTORY: Acute abdominal pain and shortness of breath. Recently diagnosed lung cancer. FINDINGS: Chest: Mediastinum: Large filling defect in the right interlobar pulmonary artery with possible smaller filling defects in branch vessels. Heart is not enlarged but there is left ventricular hypertrophy. No pericardial effusion. Thoracic aorta is normal caliber. No pathologically enlarged lymph nodes. Thyroid and esophagus are unremarkable. Lungs/pleura: Moderate upper lobe predominant emphysema. Large left upper lobe mass abutting the fissure and extending to the hilum measures 6.0 x 4.2 cm. A nodule in the medial right lower lobe measures 1.0 x 0.8 cm. Additional scattered tiny nodules noted. No consolidation or pleural effusion. Soft Tissues/Bones: Bilateral saline breast implants. No suspicious osseous lesions. Scattered degenerative changes of the thoracic spine. Abdomen/Pelvis: Organs: Diffuse hepatic metastatic disease with a large confluent mass in the central right hepatic lobe. Adrenals are thickened, right greater than left. Small left renal cyst.  Remaining solid organs and the gallbladder are unremarkable. GI/Bowel: No acute abnormality. Appendix has been removed.  Pelvis: Bladder is not well distended. Uterus is unremarkable. No lymphadenopathy or free fluid. Peritoneum/Retroperitoneum: Tumor thrombus extends into the intrahepatic IVC and common iliac veins with possible extension into the external iliac veins; no definite extension into the renal veins. No ascites. Prominent retrocrural, right upper quadrant, and retroperitoneal lymph nodes. Extensive atherosclerotic disease of the aorta without aneurysm. Incidental retroaortic left renal vein. Bones/Soft Tissues: No suspicious osseous lesions. 1. Large left upper lobe mass abutting the fissure and extending to the hilum measuring 6.0 x 4.2 cm is consistent with known malignancy. 2. Large filling defect in the right interlobar pulmonary artery consistent with acute pulmonary embolism and there may be smaller filling defects in branch vessels. No definite right heart strain. 3. Moderate emphysema. A nodule in the medial right lower lobe measuring 1.0 x 0.8 cm could also be malignant and there are additional scattered tiny nodules. 4. Diffuse hepatic metastatic disease. 5. Tumor thrombus extends into the intrahepatic IVC and common iliac veins with possible extension into the external iliac veins but no definite extension into the renal veins. 6. Prominent retrocrural, right upper quadrant, and retroperitoneal lymph nodes also suspicious for metastatic disease. Findings were given to JAMEY Valencia in the ED on 7/19/2022 at 5:38 p.m. PROCEDURES   Unless otherwise noted below, none     Procedures    CRITICAL CARE TIME   I personally saw the patient and independently provided 32 minutes of non-concurrent critical care time out of the total critical care time provided. This excludes time spent doing separately billable procedures. This includes time at the bedside, data interpretation, medication management, obtaining critical history from collateral sources if the patient is unable to provide it directly, and physician consultation. Specifics of interventions taken and potentially life-threatening diagnostic considerations are listed above in the medical decision making. CONSULTS:  IP CONSULT TO ONCOLOGY      EMERGENCY DEPARTMENT COURSE and DIFFERENTIAL DIAGNOSIS/MDM:   Vitals:    Vitals:    07/19/22 1232 07/19/22 1237 07/19/22 1600 07/19/22 1830   BP:   119/79 120/80   Pulse:       Resp:       Temp:  97.9 °F (36.6 °C)     TempSrc:  Oral     SpO2:    98%   Weight: 147 lb 1.8 oz (66.7 kg)      Height: 5' 7\" (1.702 m)          Patient was given the following medications:  Medications   heparin 25,000 unit in sodium chloride 0.45% 250 mL (premix) infusion (1,210 Units/hr IntraVENous New Bag 7/19/22 1852)   0.9 % sodium chloride bolus (1,000 mLs IntraVENous New Bag 7/19/22 1618)   ondansetron (ZOFRAN) injection 4 mg (4 mg IntraVENous Given 7/19/22 1618)   morphine (PF) injection 4 mg (4 mg IntraVENous Given 7/19/22 1618)   iopamidol (ISOVUE-370) 76 % injection 75 mL (75 mLs IntraVENous Given 7/19/22 1629)   heparin (porcine) injection 5,300 Units (5,300 Units IntraVENous Given 7/19/22 1850)         Is this patient to be included in the SEP-1 Core Measure due to severe sepsis or septic shock? No   Exclusion criteria - the patient is NOT to be included for SEP-1 Core Measure due to: Infection is not suspected    Previous records reviewed in order to gain further information regarding patient's PMH as well as her HPI. Nursing notes reviewed. This is a 64-year-old female who presents to the ED reporting severe right upper quadrant abdominal pain. Patient recently diagnosed with metastatic lung cancer but has not establish care with oncology as of yet. Physical exam complete. Patient is nontoxic, afebrile, normotensive. Patient is tearful and does appear uncomfortable. She is medicated for discomfort. Laboratory studies as above.   CT chest abdomen pelvis interpreted by radiologist-confirmed a large left upper lobe mass consistent with malignancy. In addition there is an acute pulmonary embolism as well as a thromboembolus in the IVC and common iliac veins. See above report for full details. Given this, patient will be heparinized and will require admission for further evaluation and treatment. Test results and need for admission discussed with patient. She is in agreement with this plan of care. Approximately 6 minutes of smoking cessation counseling provided during this ED visit. Benefits of smoking cessation discussed. Outpatient resources for help with smoking cessation provided. Patient is a full code at the time of this admission. FINAL IMPRESSION      1. Acute pulmonary embolism without acute cor pulmonale, unspecified pulmonary embolism type ([address]    2. Acute thromboembolism of inferior vena cava (HCC)    3. Non-small cell lung cancer with metastasis [facility])          DISPOSITION/PLAN   DISPOSITION Decision To Admit 07/19/2022 05:52:48 PM      PATIENT REFERRED TO:  No follow-up provider specified.     DISCHARGE MEDICATIONS:  New Prescriptions    No medications on file       DISCONTINUED MEDICATIONS:  Discontinued Medications    No medications on file              (Please note that portions of this note were completed with a voice recognition program.  Efforts were made to edit the dictations but occasionally words are mis-transcribed.)    LISET Giron - CNP (electronically signed)            LISET Giron CNP  07/19/22 2010

## 2022-07-20 ENCOUNTER — APPOINTMENT (OUTPATIENT)
Dept: MRI IMAGING | Age: 67
DRG: 166 | End: 2022-07-20
Payer: OTHER GOVERNMENT

## 2022-07-20 ENCOUNTER — ANESTHESIA EVENT (OUTPATIENT)
Dept: OPERATING ROOM | Age: 67
DRG: 166 | End: 2022-07-20
Payer: OTHER GOVERNMENT

## 2022-07-20 LAB
A/G RATIO: 0.8 (ref 1.1–2.2)
ALBUMIN SERPL-MCNC: 2.7 G/DL (ref 3.4–5)
ALP BLD-CCNC: 757 U/L (ref 40–129)
ALT SERPL-CCNC: 33 U/L (ref 10–40)
ANION GAP SERPL CALCULATED.3IONS-SCNC: 12 MMOL/L (ref 3–16)
APTT: 54.1 SEC (ref 23–34.3)
APTT: 62.9 SEC (ref 23–34.3)
APTT: 93.2 SEC (ref 23–34.3)
AST SERPL-CCNC: 147 U/L (ref 15–37)
BASOPHILS ABSOLUTE: 0 K/UL (ref 0–0.2)
BASOPHILS RELATIVE PERCENT: 0.3 %
BILIRUB SERPL-MCNC: 0.8 MG/DL (ref 0–1)
BUN BLDV-MCNC: 15 MG/DL (ref 7–20)
CALCIUM SERPL-MCNC: 8.6 MG/DL (ref 8.3–10.6)
CHLORIDE BLD-SCNC: 86 MMOL/L (ref 99–110)
CO2: 27 MMOL/L (ref 21–32)
CREAT SERPL-MCNC: 0.6 MG/DL (ref 0.6–1.2)
EOSINOPHILS ABSOLUTE: 0.1 K/UL (ref 0–0.6)
EOSINOPHILS RELATIVE PERCENT: 0.7 %
GFR AFRICAN AMERICAN: >60
GFR NON-AFRICAN AMERICAN: >60
GLUCOSE BLD-MCNC: 121 MG/DL (ref 70–99)
HCT VFR BLD CALC: 33.3 % (ref 36–48)
HEMOGLOBIN: 11.1 G/DL (ref 12–16)
LV EF: 58 %
LVEF MODALITY: NORMAL
LYMPHOCYTES ABSOLUTE: 1.7 K/UL (ref 1–5.1)
LYMPHOCYTES RELATIVE PERCENT: 21 %
MCH RBC QN AUTO: 29.4 PG (ref 26–34)
MCHC RBC AUTO-ENTMCNC: 33.2 G/DL (ref 31–36)
MCV RBC AUTO: 88.4 FL (ref 80–100)
MONOCYTES ABSOLUTE: 1 K/UL (ref 0–1.3)
MONOCYTES RELATIVE PERCENT: 11.5 %
NEUTROPHILS ABSOLUTE: 5.5 K/UL (ref 1.7–7.7)
NEUTROPHILS RELATIVE PERCENT: 66.5 %
OSMOLALITY: 276 MOSM/KG (ref 280–301)
PDW BLD-RTO: 16.5 % (ref 12.4–15.4)
PLATELET # BLD: 349 K/UL (ref 135–450)
PMV BLD AUTO: 8 FL (ref 5–10.5)
POTASSIUM REFLEX MAGNESIUM: 3.9 MMOL/L (ref 3.5–5.1)
RBC # BLD: 3.77 M/UL (ref 4–5.2)
REASON FOR REJECTION: NORMAL
REJECTED TEST: NORMAL
SODIUM BLD-SCNC: 125 MMOL/L (ref 136–145)
TOTAL PROTEIN: 5.9 G/DL (ref 6.4–8.2)
WBC # BLD: 8.3 K/UL (ref 4–11)

## 2022-07-20 PROCEDURE — 1200000000 HC SEMI PRIVATE

## 2022-07-20 PROCEDURE — 85025 COMPLETE CBC W/AUTO DIFF WBC: CPT

## 2022-07-20 PROCEDURE — 6370000000 HC RX 637 (ALT 250 FOR IP): Performed by: INTERNAL MEDICINE

## 2022-07-20 PROCEDURE — A9577 INJ MULTIHANCE: HCPCS | Performed by: INTERNAL MEDICINE

## 2022-07-20 PROCEDURE — 80053 COMPREHEN METABOLIC PANEL: CPT

## 2022-07-20 PROCEDURE — 6370000000 HC RX 637 (ALT 250 FOR IP): Performed by: NURSE PRACTITIONER

## 2022-07-20 PROCEDURE — 93306 TTE W/DOPPLER COMPLETE: CPT

## 2022-07-20 PROCEDURE — APPSS45 APP SPLIT SHARED TIME 31-45 MINUTES: Performed by: PHYSICIAN ASSISTANT

## 2022-07-20 PROCEDURE — 2580000003 HC RX 258: Performed by: NURSE PRACTITIONER

## 2022-07-20 PROCEDURE — 83930 ASSAY OF BLOOD OSMOLALITY: CPT

## 2022-07-20 PROCEDURE — 94760 N-INVAS EAR/PLS OXIMETRY 1: CPT

## 2022-07-20 PROCEDURE — 51798 US URINE CAPACITY MEASURE: CPT

## 2022-07-20 PROCEDURE — 6360000004 HC RX CONTRAST MEDICATION: Performed by: INTERNAL MEDICINE

## 2022-07-20 PROCEDURE — 36415 COLL VENOUS BLD VENIPUNCTURE: CPT

## 2022-07-20 PROCEDURE — 93356 MYOCRD STRAIN IMG SPCKL TRCK: CPT

## 2022-07-20 PROCEDURE — APPNB45 APP NON BILLABLE 31-45 MINUTES: Performed by: PHYSICIAN ASSISTANT

## 2022-07-20 PROCEDURE — 70553 MRI BRAIN STEM W/O & W/DYE: CPT

## 2022-07-20 PROCEDURE — 2500000003 HC RX 250 WO HCPCS: Performed by: NURSE PRACTITIONER

## 2022-07-20 PROCEDURE — 6360000002 HC RX W HCPCS: Performed by: INTERNAL MEDICINE

## 2022-07-20 PROCEDURE — 6360000002 HC RX W HCPCS: Performed by: EMERGENCY MEDICINE

## 2022-07-20 PROCEDURE — 85730 THROMBOPLASTIN TIME PARTIAL: CPT

## 2022-07-20 RX ORDER — NICOTINE 21 MG/24HR
1 PATCH, TRANSDERMAL 24 HOURS TRANSDERMAL EVERY 24 HOURS
Status: DISCONTINUED | OUTPATIENT
Start: 2022-07-20 | End: 2022-07-27 | Stop reason: HOSPADM

## 2022-07-20 RX ORDER — LORAZEPAM 1 MG/1
1 TABLET ORAL ONCE
Status: COMPLETED | OUTPATIENT
Start: 2022-07-20 | End: 2022-07-20

## 2022-07-20 RX ORDER — HEPARIN SODIUM 1000 [USP'U]/ML
5400 INJECTION, SOLUTION INTRAVENOUS; SUBCUTANEOUS ONCE
Status: COMPLETED | OUTPATIENT
Start: 2022-07-20 | End: 2022-07-20

## 2022-07-20 RX ORDER — HEPARIN SODIUM 1000 [USP'U]/ML
2600 INJECTION, SOLUTION INTRAVENOUS; SUBCUTANEOUS ONCE
Status: COMPLETED | OUTPATIENT
Start: 2022-07-20 | End: 2022-07-20

## 2022-07-20 RX ADMIN — HEPARIN SODIUM 1480 UNITS/HR: 10000 INJECTION, SOLUTION INTRAVENOUS at 05:05

## 2022-07-20 RX ADMIN — HEPARIN SODIUM 2600 UNITS: 1000 INJECTION INTRAVENOUS; SUBCUTANEOUS at 23:57

## 2022-07-20 RX ADMIN — SODIUM CHLORIDE, PRESERVATIVE FREE 10 ML: 5 INJECTION INTRAVENOUS at 22:07

## 2022-07-20 RX ADMIN — HEPARIN SODIUM 5400 UNITS: 1000 INJECTION INTRAVENOUS; SUBCUTANEOUS at 05:02

## 2022-07-20 RX ADMIN — LORAZEPAM 1 MG: 1 TABLET ORAL at 12:10

## 2022-07-20 RX ADMIN — OXYCODONE AND ACETAMINOPHEN 2 TABLET: 5; 325 TABLET ORAL at 05:07

## 2022-07-20 RX ADMIN — HEPARIN SODIUM 1480 UNITS/HR: 10000 INJECTION, SOLUTION INTRAVENOUS at 13:19

## 2022-07-20 RX ADMIN — Medication 10.5 MG: at 00:55

## 2022-07-20 RX ADMIN — OXYCODONE AND ACETAMINOPHEN 2 TABLET: 5; 325 TABLET ORAL at 22:05

## 2022-07-20 RX ADMIN — OXYCODONE AND ACETAMINOPHEN 2 TABLET: 5; 325 TABLET ORAL at 11:16

## 2022-07-20 RX ADMIN — LORAZEPAM 1 MG: 1 TABLET ORAL at 13:16

## 2022-07-20 RX ADMIN — GADOBENATE DIMEGLUMINE 13 ML: 529 INJECTION, SOLUTION INTRAVENOUS at 17:23

## 2022-07-20 RX ADMIN — SODIUM CHLORIDE, PRESERVATIVE FREE 10 ML: 5 INJECTION INTRAVENOUS at 11:19

## 2022-07-20 RX ADMIN — Medication 10.5 MG: at 22:06

## 2022-07-20 RX ADMIN — POLYETHYLENE GLYCOL 3350 17 G: 17 POWDER, FOR SOLUTION ORAL at 11:17

## 2022-07-20 RX ADMIN — POLYETHYLENE GLYCOL 3350 17 G: 17 POWDER, FOR SOLUTION ORAL at 00:55

## 2022-07-20 ASSESSMENT — PAIN DESCRIPTION - DESCRIPTORS
DESCRIPTORS: ACHING;DISCOMFORT
DESCRIPTORS: HEAVINESS;SHARP
DESCRIPTORS: STABBING;SHARP
DESCRIPTORS: ACHING;CRAMPING;DISCOMFORT

## 2022-07-20 ASSESSMENT — PAIN DESCRIPTION - ORIENTATION
ORIENTATION: RIGHT;MID
ORIENTATION: RIGHT;UPPER
ORIENTATION: RIGHT

## 2022-07-20 ASSESSMENT — PAIN DESCRIPTION - LOCATION
LOCATION: ABDOMEN

## 2022-07-20 ASSESSMENT — PAIN - FUNCTIONAL ASSESSMENT
PAIN_FUNCTIONAL_ASSESSMENT: PREVENTS OR INTERFERES SOME ACTIVE ACTIVITIES AND ADLS
PAIN_FUNCTIONAL_ASSESSMENT: PREVENTS OR INTERFERES SOME ACTIVE ACTIVITIES AND ADLS

## 2022-07-20 ASSESSMENT — PAIN SCALES - GENERAL
PAINLEVEL_OUTOF10: 8
PAINLEVEL_OUTOF10: 7
PAINLEVEL_OUTOF10: 2
PAINLEVEL_OUTOF10: 10

## 2022-07-20 ASSESSMENT — PAIN DESCRIPTION - ONSET
ONSET: ON-GOING
ONSET: ON-GOING

## 2022-07-20 ASSESSMENT — PAIN DESCRIPTION - FREQUENCY: FREQUENCY: CONTINUOUS

## 2022-07-20 ASSESSMENT — PAIN DESCRIPTION - PAIN TYPE
TYPE: ACUTE PAIN
TYPE: ACUTE PAIN

## 2022-07-20 NOTE — ED NOTES
Handoff report given to oncoming RN to assume care. No further questions at this time. I will transport the pt to room 3120. If any questions arise, please call [phone].        Claudia Rodas RN  07/20/22 0015       Claudia Rodas RN  07/20/22 1667

## 2022-07-20 NOTE — CONSULTS
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 Russella Gowers tomorrow  Will need to hold heparin prior to surgical procedure  Treatment consent  NPO after midnight  Monitor and control pain  Dr. Pérez Redo to arrange chemo therapy once Port has been placed  SUBJECTIVE:   History of Chief Complaint:    Marylou Alvarez is a 74 y.o. female who was recently diagnosed with metastatic non small cell lung cancer and presented to the ER yesterday with right upper quadrant abdominal pain. She stated that she has had the abdominal pain for about one week and it has been increasing in intensity. She had a CT scan performed yesterday and this showed her to have a large left upper lobe mass, a pulmonary embolism, diffuse hepatic metastatic disease, and a tumor thrombus in the IVC. The tumor thrombus is likely the cause of her abdominal pain. We have been consulted to place a Port so the patient can start chemotherapy treatments. We discussed the need for a Port, the procedures and risk and benefits and she agrees to have the [place] placed. Past Medical History  Reviewed  has a past medical history of Headache. Past Surgical History  Reviewed has a past surgical history that includes Appendectomy and Tonsillectomy. Medications  Prior to Admission medications    Medication Sig Start Date End Date Taking?  Authorizing Provider   doxyLAMINE succinate (GNP SLEEP AID) 25 MG tablet Take 50 mg by mouth nightly   Yes Historical Provider, MD   Melatonin 10 MG TABS Take 10 mg by mouth at bedtime   Yes Historical Provider, MD   oxyCODONE-acetaminophen (PERCOCET) 5-325 MG per tablet Take 1 tablet by mouth every 6 hours as needed for Pain for up to 7 days. Intended supply: 7 days. Take lowest dose possible to manage pain 7/13/22 7/20/22  Adolfo Goodman MD   furosemide (LASIX) 20 MG tablet Take 1 tablet by mouth daily  Patient not taking: Reported on 7/20/2022 7/13/22   Adolfo Goodman MD   SUMAtriptan (IMITREX) 100 MG tablet Take 1 tablet by mouth daily as needed for Migraine 6/23/22   Adolfo Goodman MD    Scheduled Meds:   LORazepam  1 mg Oral Once    melatonin  10.5 mg Oral Nightly    sodium chloride flush  5-40 mL IntraVENous 2 times per day     Continuous Infusions:   heparin (PORCINE) Infusion 1,480 Units/hr (07/20/22 0641)    sodium chloride       PRN Meds:.sodium chloride flush, sodium chloride, ondansetron **OR** ondansetron, polyethylene glycol, acetaminophen **OR** acetaminophen, perflutren lipid microspheres, morphine **OR** morphine, oxyCODONE-acetaminophen **OR** oxyCODONE-acetaminophen  Allergies  has No Known Allergies. Family History  Reviewedfamily history includes Arthritis in her mother; High Blood Pressure in her mother. Social History   reports that she has been smoking cigarettes. She has a 50.00 pack-year smoking history. She has never used smokeless tobacco. She reports that she does not drink alcohol and does not use drugs. EDUCATION  Patient educated about their illness/diagnosis, stated above, and all questions answered. We discussed the importance of nutrition, medications they are taking, and healthy lifestyle.   Review of Systems:  General Denies any fever or chills  HEENT Denies any diplopia, tinnitus or vertigo  Resp Denies any shortness of breath, cough or wheezing  Cardiac Denies any chest pain, palpitations, claudication or edema  GI Denies any melena, hematochezia, hematemesis or pyrosis   Denies any frequency, urgency, hesitancy or incontinence  Heme Denies bruising or bleeding easily  Neuro Denies any focal motor or sensory deficits  OBJECTIVE:   VITALS:  height is 5' 7\" (1.702 m) and weight is 147 lb 1.8 oz (66.7 kg). Her oral temperature is 97.7 °F (36.5 °C). Her blood pressure is 108/57 (abnormal) and her pulse is 80. Her respiration is 18 and oxygen saturation is 94%. CONSTITUTIONAL: Alert and oriented times 3, no acute distress and cooperative to examination with proper mood and affect. SKIN: Skin color, texture, turgor normal. No rashes or lesions. HEENT: Head is normocephalic, atraumatic. EOMI, PERRLA. NECK: Supple, symmetrical, trachea midline, no adenopathy, thyroid symmetric, not enlarged and no tenderness, skin normal.  CHEST/LUNGS: chest symmetric with normal A/P diameter, normal respiratory rate and rhythm  CARDIOVASCULAR: Heart sounds are normal.  Regular rate and rhythm   ABDOMEN: Normal shape. Tenderness: right sided   RECTAL: deferred, not clinically indicated  NEUROLOGIC: There are no focalizing motor or sensory deficits. CN II-XII are grossly intact. [place] EXTREMITIES: no cyanosis, no clubbing, and no edema.   LABS:     Recent Labs     07/19/22  1438 07/20/22  0633   WBC 10.3 8.3   HGB 13.2 11.1*   HCT 40.1 33.3*    349   * 125*   K 5.2* 3.9   CL 83* 86*   CO2 28 27   BUN 14 15   CREATININE 0.6 0.6   CALCIUM 9.8 8.6   INR 1.09  --    * 147*   ALT 38 33   BILITOT 1.1* 0.8     Recent Labs     07/19/22  1438 07/20/22  0633   ALKPHOS 890* 757*   ALT 38 33   * 147*   BILITOT 1.1* 0.8   LABALBU 3.3* 2.7*   LIPASE 17.0  --      CBC:   Lab Results   Component Value Date/Time    WBC 8.3 07/20/2022 06:33 AM    RBC 3.77 07/20/2022 06:33 AM    HGB 11.1 07/20/2022 06:33 AM    HCT 33.3 07/20/2022 06:33 AM    MCV 88.4 07/20/2022 06:33 AM    MCH 29.4 07/20/2022 06:33 AM    MCHC 33.2 07/20/2022 06:33 AM    RDW 16.5 07/20/2022 06:33 AM     07/20/2022 06:33 AM    MPV 8.0 07/20/2022 06:33 AM     CMP:    Lab Results   Component Value Date/Time     07/20/2022 06:33 AM    K 3.9 07/20/2022 06:33 AM CL 86 07/20/2022 06:33 AM    CO2 27 07/20/2022 06:33 AM    BUN 15 07/20/2022 06:33 AM    CREATININE 0.6 07/20/2022 06:33 AM    GFRAA >60 07/20/2022 06:33 AM    AGRATIO 0.8 07/20/2022 06:33 AM    LABGLOM >60 07/20/2022 06:33 AM    GLUCOSE 121 07/20/2022 06:33 AM    PROT 5.9 07/20/2022 06:33 AM    LABALBU 2.7 07/20/2022 06:33 AM    CALCIUM 8.6 07/20/2022 06:33 AM    BILITOT 0.8 07/20/2022 06:33 AM    ALKPHOS 757 07/20/2022 06:33 AM     07/20/2022 06:33 AM    ALT 33 07/20/2022 06:33 AM     Urine Culture:  No components found for: CURINE  Blood Culture:  No components found for: CBLOOD, CFUNGUSBL    RADIOLOGY/IMAGING    Thank you for the interesting evaluation. Further recommendations to follow. Angela Bourgeois [facility]  General and Vascular Surgery [phone]  Electronically signed by Laxmi Clemons PA-C on 7/20/2022 at 11:14 AM    As above  Plan Port for 7/21    Electronically signed by Mukund Lipscomb MD on 7/20/2022 at 2:48 PM

## 2022-07-20 NOTE — PLAN OF CARE
Problem: Discharge Planning  Goal: Discharge to home or other facility with appropriate resources  7/20/2022 1338 by Renetta España RN  Outcome: Progressing  Flowsheets (Taken 7/20/2022 0042 by Vanessa Lam RN)  Discharge to home or other facility with appropriate resources: Identify barriers to discharge with patient and caregiver  7/20/2022 1337 by Renetta España RN  Outcome: Progressing  7/20/2022 0243 by Vanessa Lam RN  Outcome: Progressing  Flowsheets  Taken 7/20/2022 0042  Discharge to home or other facility with appropriate resources: Identify barriers to discharge with patient and caregiver  Taken 7/20/2022 0034  Discharge to home or other facility with appropriate resources: Identify barriers to discharge with patient and caregiver     Problem: Pain  Goal: Verbalizes/displays adequate comfort level or baseline comfort level  7/20/2022 1338 by Renetta España RN  Outcome: Progressing  Flowsheets (Taken 7/20/2022 0243 by Vanessa Lam RN)  Verbalizes/displays adequate comfort level or baseline comfort level:   Encourage patient to monitor pain and request assistance   Assess pain using appropriate pain scale   Administer analgesics based on type and severity of pain and evaluate response  7/20/2022 1337 by Renetta España RN  Outcome: Progressing  7/20/2022 0243 by Vanessa Lam RN  Outcome: Progressing  Flowsheets (Taken 7/20/2022 0243)  Verbalizes/displays adequate comfort level or baseline comfort level:   Encourage patient to monitor pain and request assistance   Assess pain using appropriate pain scale   Administer analgesics based on type and severity of pain and evaluate response     Problem: Safety - Adult  Goal: Free from fall injury  7/20/2022 1338 by Renetta España RN  Outcome: Progressing  Flowsheets (Taken 7/20/2022 0243 by Vanessa Lam RN)  Free From Fall Injury:   Instruct family/caregiver on patient safety   Based on caregiver fall risk screen, instruct family/caregiver

## 2022-07-20 NOTE — PROGRESS NOTES
Pt admitted to room 3120 from ED. Pt here for increased abd pain. She is able to ambulate to and from bathroom using a cane with standby assist. She is aware of her situation and is agreeable to the POC. Currently, pt is in bed resting comfortably with call light within reach. All questions were answered. Pt on heparin gtt at 1210 units/hr, site is patent and no complaints at this time. Pt reports weakness from her recent (6/28/2022) biopsy to her right lower extremity. Her BLE are swollen, which she states is better than what it normally is.

## 2022-07-20 NOTE — PROGRESS NOTES
Clinical Pharmacy Note  Heparin Dosing       Lab Results   Component Value Date/Time    APTT 93.2 07/20/2022 02:59 PM     Lab Results   Component Value Date/Time    HGB 11.1 07/20/2022 06:33 AM    HCT 33.3 07/20/2022 06:33 AM     07/20/2022 06:33 AM    INR 1.09 07/19/2022 02:38 PM       Current Infusion Rate: 1480 units/hr    Plan:  No bolus  Rate: continue current rate of 1480 units/hr  Next aPTT: 7/20/22 at 2100    Pharmacy will continue to monitor and adjust based on aPTT results.      [id number] [facility] Princess Lloyd  7/20/2022 4:28 PM

## 2022-07-20 NOTE — PROGRESS NOTES
Pt given PRN percocet at 0507 this AM. She is currently sleeping on her left side, easily arousable. She is not currently complaining of pain but does state that when she moves or takes a deep breath that the pain is significantly worse. She was instructed how often she is able to receive pain intervention and she verbalized understanding. Call light within reach, bed alarm on and no further complaints at this time.

## 2022-07-20 NOTE — PROGRESS NOTES
Clinical Pharmacy Note  Heparin Dosing       Lab Results   Component Value Date/Time    APTT 54.1 07/20/2022 02:14 AM     Lab Results   Component Value Date/Time    HGB 13.2 07/19/2022 02:38 PM    HCT 40.1 07/19/2022 02:38 PM     07/19/2022 02:38 PM    INR 1.09 07/19/2022 02:38 PM       Current Infusion Rate: 1210 units/hr    Plan:  Bolus: 5400 units  Rate: Increase to 1480 units/hr  Next aPTT: 1100  7/20/22    Pharmacy will continue to monitor and adjust based on aPTT results.     LINUS Siddiqui [facility]  7/20/2022 5:00 AM

## 2022-07-20 NOTE — PLAN OF CARE
Problem: Discharge Planning  Goal: Discharge to home or other facility with appropriate resources  Outcome: Progressing  Flowsheets  Taken 7/20/2022 0042  Discharge to home or other facility with appropriate resources: Identify barriers to discharge with patient and caregiver  Taken 7/20/2022 0034  Discharge to home or other facility with appropriate resources: Identify barriers to discharge with patient and caregiver     Problem: Pain  Goal: Verbalizes/displays adequate comfort level or baseline comfort level  Outcome: Progressing  Flowsheets (Taken 7/20/2022 0243)  Verbalizes/displays adequate comfort level or baseline comfort level:   Encourage patient to monitor pain and request assistance   Assess pain using appropriate pain scale   Administer analgesics based on type and severity of pain and evaluate response     Problem: Safety - Adult  Goal: Free from fall injury  Outcome: Progressing  Flowsheets (Taken 7/20/2022 0243)  Free From Fall Injury:   Instruct family/caregiver on patient safety   Based on caregiver fall risk screen, instruct family/caregiver to ask for assistance with transferring infant if caregiver noted to have fall risk factors     Problem: ABCDS Injury Assessment  Goal: Absence of physical injury  Outcome: Progressing  Flowsheets (Taken 7/20/2022 0243)  Absence of Physical Injury: Implement safety measures based on patient assessment

## 2022-07-20 NOTE — CONSULTS
[address]                                  CONSULTATION    PATIENT NAME: Gentry Vargas                     :        1955  MED REC NO:   [id number]                          ROOM:       [id number]  ACCOUNT NO:   [de-identified]                           ADMIT DATE: 2022  PROVIDER:     Earlene Nascimento MD    ONCOLOGY CONSULTATION    CONSULT DATE:  2022    REASON FOR CONSULTATION:  Metastatic lung cancer. CONSULTING PROVIDER:  Tamika Martínez MD    HISTORY OF PRESENT ILLNESS:  The patient is a 75-year-old female, who  presented to the hospital with worsening right upper quadrant abdominal  pain. In terms of her recent history, she was having pain and swelling  in her right lower extremity. An MRI of her leg showed a 3.8-cm mass in  the musculature in the right lower leg. She had a biopsy of this by Dr. Natalia Mario a few weeks ago that showed metastatic adenocarcinoma. She was  then referred to me and I was supposed to see her tomorrow. She came  into the hospital last evening with a worsening right upper quadrant  abdominal pain. A CT of the chest and abdomen and pelvis were done that  showed a 6-cm left upper lobe mass with a 1-cm right lower lobe mass. There were multiple hepatic lesions with a large mass in the central  right hepatic lobe. There was tumor thrombus extending into the IVC and  common iliac vessels. There was intraperitoneal and retroperitoneal  lymphadenopathy. Oncology was consulted. Her abdominal pain is  slightly better today. She was also found to have pulmonary emboli on  her CT scan. PAST MEDICAL HISTORY:  Chronic migraines. PAST SURGICAL HISTORY:  1.  Status post appendectomy. 2.  Status post tonsillectomy. ALLERGIES:  She has no known drug allergies. HOME MEDICATIONS:  Percocet as needed, Imitrex daily as needed, Lasix 20  mg p.o. daily.     SOCIAL HISTORY:  She is . She smokes one pack per day and has  done so for 60 years. She does not drink. She works at the [place] in the  [employer]. FAMILY HISTORY:  Noncontributory. REVIEW OF SYSTEMS:  She denies any recent fevers, chills, shortness of  breath, chest pain, headaches, dysphagia, odynophagia, diarrhea,  constipation, hemoptysis, hematemesis, change in  vision/hearing/smell/taste, weakness, neuropathy, skin rashes,  productive cough, urinary or bowel prolapse or incontinence, petechiae,  purpura, skin rashes, vaginal bleeding, pruritus, hallucinations, nasal  congestion or drainage, seizures, strokes, syncope, depression, anxiety,  suicidal ideations, melena, or hematochezia. She has mild to moderate  fatigue. She has right upper quadrant abdominal pain. She has right  lower extremity pain. Her 10-system review of systems is otherwise  negative. PHYSICAL EXAMINATION:  VITAL SIGNS:  She is afebrile with normal vital signs. GENERAL:  She is in no acute distress. HEENT:  Her pupils are round and reactive to light and accommodation. Extraocular muscles are intact. NECK:  She has no jugular venous distention. No thyromegaly. Oropharynx is clear. She has no carotid bruits. She has no palpable  lymphadenopathy. HEART:  Regular rate and rhythm. LUNGS:  Clear to auscultation bilaterally. ABDOMEN:  Nondistended with mild to moderate right upper quadrant  tenderness. The liver span has increased. EXTREMITIES:  She has 1+ right lower extremity edema and pain over her  biopsy site in the right lower extremity. NEUROLOGIC:  Exam is nonfocal.    LABORATORY DATA:  Her white blood cell count is 10.3, hemoglobin 13.2,  platelets of 561. ASSESSMENT AND PLAN:  1.  Stage IV non-small cell lung cancer. I had a very long discussion  with the patient. I reviewed her radiology and pathology in private and  with the patient.   As I explained, her disease is not potentially  curable

## 2022-07-20 NOTE — CARE COORDINATION
INITIAL CASE MANAGEMENT ASSESSMENT    Reviewed chart, met with patient to assess possible discharge needs. Explained Case Management role/services. Living Situation: lives at home with her  -- denies any issues with steps for entry in to the home    ADLs: independent     DME: has been using her 's cane recently for leverage    PT/OT Recs: not ordered at present      Active Services: none     Transportation: active -- can transport home at dc     Medications: no issues-- uses [facility] [address]    PCP: Sri Nobles      HD/PD: n/a    PLAN/COMMENTS: patient plans to return home with her  at dc-- requested PT/OT eval to assist in determining home dc needs- will follow. SW/CM provided contact information for patient or family to call with any questions. SW/CM will follow and assist as needed.     Electronically signed by Terri Christopher on 7/20/2022 at 11:56 AM  #[phone]

## 2022-07-20 NOTE — CONSULTS
Department of Radiation Oncology  Attending Consult Note      Reason for Consult:  Newly diagnosed metastatic lung cancer  Requesting Physician:  Britney Muñiz MD    CHIEF COMPLAINT:  Leg discomfort and swelling    History Obtained From:  patient, electronic medical record    HISTORY OF PRESENT ILLNESS:      The patient is a 74 y.o. Woman who initially reported with pain and swelling in the right lower extremity. MRI of the right leg on 6/13/2022 showed a 3.8 cm mass in the musculature of the right lower leg. There was surrounding soft tissue enhancement and edema and possible extension into the overlying subcutaneous fat. She saw Dr. Asa Sandhu with orthopedics who performed biopsy of the mass on 7/6/2022 which showed non-small cell carcinoma suggestive of an adenocarcinoma. She was planned for outpatient work-up but had worsening right upper quadrant pain and she was admitted to the hospital.    CT of the chest abdomen pelvis on 7/19/2022 showed a large left upper lobe lung mass measuring 6 cm extending to the hilum. There was a large filling defect compatible with pulmonary embolism. There was an additional nodule in the medial right lower lobe measuring 1 cm. There was bulky diffuse hepatic metastatic disease and tumor thrombus extending into the IVC and common iliac veins. There were prominent retrocrural, right upper quadrant, and retroperitoneal lymph nodes suspicious for metastatic disease. The patient has been seen by Dr. Ellen Orosco with medical oncology who has discussed potential combined chemo and immunotherapy as an outpatient. She is continuing to have swelling in the legs and more notable pain and discomfort at the area of her initially biopsy-proven disease in the right lower leg.         Past Medical History:    Past Medical History:   Diagnosis Date    Headache        Past Surgical History:    Past Surgical History:   Procedure Laterality Date    APPENDECTOMY      TONSILLECTOMY Current Medications:      Current Facility-Administered Medications:     nicotine (NICODERM CQ) 14 MG/24HR 1 patch, 1 patch, TransDERmal, Q24H, Bella Cockayne, MD, 1 patch at 07/20/22 1248    heparin 25,000 unit in sodium chloride 0.45% 250 mL (premix) infusion, 0-3,000 Units/hr, IntraVENous, Continuous, Ting Flatness, APRN - CNP, Last Rate: 14.8 mL/hr at 07/20/22 1319, 1,480 Units/hr at 07/20/22 1319    melatonin tablet 10.5 mg, 10.5 mg, Oral, Nightly, Ting Flatness, APRN - CNP, 10.5 mg at 07/20/22 0055    sodium chloride flush 0.9 % injection 5-40 mL, 5-40 mL, IntraVENous, 2 times per day, Ting Flatness, APRN - CNP, 10 mL at 07/20/22 1119    sodium chloride flush 0.9 % injection 5-40 mL, 5-40 mL, IntraVENous, PRN, Ting Flatness, APRN - CNP    0.9 % sodium chloride infusion, , IntraVENous, PRN, Ting Flatness, APRN - CNP    ondansetron (ZOFRAN-ODT) disintegrating tablet 4 mg, 4 mg, Oral, Q8H PRN **OR** ondansetron (ZOFRAN) injection 4 mg, 4 mg, IntraVENous, Q6H PRN, Ting Flatness, APRN - CNP    polyethylene glycol (GLYCOLAX) packet 17 g, 17 g, Oral, Daily PRN, Ting Flatness, APRN - CNP, 17 g at 07/20/22 1117    acetaminophen (TYLENOL) tablet 650 mg, 650 mg, Oral, Q6H PRN **OR** acetaminophen (TYLENOL) suppository 650 mg, 650 mg, Rectal, Q6H PRN, Ting Flatness, APRN - CNP    perflutren lipid microspheres (DEFINITY) injection 1.65 mg, 1.5 mL, IntraVENous, ONCE PRN, Ting Flatness, APRN - CNP    morphine (PF) injection 2 mg, 2 mg, IntraVENous, Q2H PRN **OR** morphine (PF) injection 4 mg, 4 mg, IntraVENous, Q2H PRN, Ting Flatness, APRN - CNP    oxyCODONE-acetaminophen (PERCOCET) 5-325 MG per tablet 1 tablet, 1 tablet, Oral, Q4H PRN **OR** oxyCODONE-acetaminophen (PERCOCET) 5-325 MG per tablet 2 tablet, 2 tablet, Oral, Q4H PRN, Ting Malhotra, APRN - CNP, 2 tablet at 07/20/22 1116    Allergies:  Review of patient's allergies indicates no known allergies.     Social History:    Social History     Socioeconomic History    Marital status: Single     Spouse name: Not on file    Number of children: Not on file    Years of education: Not on file    Highest education level: Not on file   Occupational History    Occupation: health tech   Tobacco Use    Smoking status: Every Day     Packs/day: 1.00     Years: 50.00     Pack years: 50.00     Types: Cigarettes    Smokeless tobacco: Never   Substance and Sexual Activity    Alcohol use: No    Drug use: No    Sexual activity: Not on file   Other Topics Concern    Not on file   Social History Narrative    Not on file     Social Determinants of Health     Financial Resource Strain: Not on file   Food Insecurity: Not on file   Transportation Needs: Not on file   Physical Activity: Not on file   Stress: Not on file   Social Connections: Not on file   Intimate Partner Violence: Not on file   Housing Stability: Not on file       Family History:   Family History   Problem Relation Age of Onset    Arthritis Mother     High Blood Pressure Mother        REVIEW OF SYSTEMS:    As outlined in HPI and chart review, otherwise review of general, eyes, nose, throat, pulmonary, cardiac, GI, , musculoskeletal, neurological, and integumentary systems is negative.     PHYSICAL EXAM:      Vitals:    Vitals:    07/20/22 0907   BP:    Pulse: 80   Resp: 18   Temp:    SpO2: 94%     ECOG Performance Status (ECOG Scale 0-4):  2  GENERAL: Awake, alert, and oriented, no apparent distress  EYES: Sclera clear, conjunctiva normal  ENT: Normocephalic, atraumatic  NECK: Symmetrical, no visualized masses  LUNGS: No increased work of breathing, no audible wheezing  ABDOMEN: Non-distended, symmetrical  MUSCULOSKELETAL: normal range of motion, normal ambulation  NEUROLOGIC: alert, gross motor skills intact  SKIN: no visible jaundice, rash, or bleeding  EXTREMETIES: 2+ lower Extremity edema with more notable edema surrounding the biopsy site on the lateral right lower leg    DATA:    I personally

## 2022-07-20 NOTE — PROGRESS NOTES
Hospitalist Progress Note      PCP: Serene Hester MD    Date of Admission: 7/19/2022    Chief Complaint: RUQ abdominal pain    Hospital Course: 74 y.o. female with recent diagnosis of metastatic non small cell lung cancer otherwise no PMHx presented to [facility] with right upper quadrant abdominal pain. Sent in by her PCP. Patient reports pain for the last week. Constant and worsening. No nausea or vomiting. Last bowel movement 6 days ago. Patient had localized area of swelling initially in her right lower leg back in February. She had work-up done by vascular surgery which was unremarkable for vascular findings so  she was referred to orthopedic oncologist and pathology revealed small cell carcinoma. She has been referred to hematology oncology and is awaiting consultation. CT chest/abdomen/pelvis with contrast in the ED revealed large left upper lobe mass measuring 6.0 x 4.2 cm consistent with known malignancy. There is a large filling defect in the right interlobar pulmonary artery consistent with acute pulmonary embolism. There may be smaller filling defects in the branch vessels. No definite right heart strain. There is a 1.0 x 0.8 cm nodule in the right lower lobe could also be malignant. Additional scattered tiny nodules. There is diffuse hepatic metastatic disease with tumor thrombus extending into the intrahepatic IVC and common iliac veins with possible extension into the external iliac veins but no extension into the renal veins. There is prominent retrocrural/right upper quadrant and retroperitoneal lymph nodes also suspicious for metastatic disease. ED started the patient on heparin for PE and IVC clot. Heme-onc was consulted and results were discussed in detail by ED provider. Subjective: Pt seen and examined. She is feeling somewhat better, but she has persistent RUQ pain.        Medications:  Reviewed    Infusion Medications    heparin (PORCINE) Infusion 1,480 Units/hr (07/20/22 1319)    sodium chloride       Scheduled Medications    nicotine  1 patch TransDERmal Q24H    melatonin  10.5 mg Oral Nightly    sodium chloride flush  5-40 mL IntraVENous 2 times per day     PRN Meds: sodium chloride flush, sodium chloride, ondansetron **OR** ondansetron, polyethylene glycol, acetaminophen **OR** acetaminophen, perflutren lipid microspheres, morphine **OR** morphine, oxyCODONE-acetaminophen **OR** oxyCODONE-acetaminophen      Intake/Output Summary (Last 24 hours) at 7/20/2022 1320  Last data filed at 7/20/2022 1003  Gross per 24 hour   Intake 382.85 ml   Output --   Net 382.85 ml       Exam:    BP (!) 108/57   Pulse 80   Temp 97.7 °F (36.5 °C) (Oral)   Resp 18   Ht 5' 7\" (1.702 m)   Wt 147 lb 1.8 oz (66.7 kg)   SpO2 94%   BMI 23.04 kg/m²     General appearance: No apparent distress, appears stated age and cooperative. HEENT: Pupils equal, round, and reactive to light. Conjunctivae/corneas clear. Neck: Supple, with full range of motion. No jugular venous distention. Trachea midline. Respiratory:  Normal respiratory effort. Clear to auscultation, bilaterally without Rales/Wheezes/Rhonchi. Cardiovascular: Regular rate and rhythm with normal S1/S2 without murmurs, rubs or gallops. Abdomen: Soft, TTP to RUQ, non-distended with normal bowel sounds. Musculoskeletal: TTP to RLE with erythema and warmth. No clubbing, cyanosis or edema bilaterally. Full range of motion without deformity. Skin: Skin color, texture, turgor normal.  No rashes or lesions. Neurologic:  Neurovascularly intact without any focal sensory/motor deficits.  Cranial nerves: II-XII intact, grossly non-focal.  Psychiatric: Alert and oriented, thought content appropriate, normal insight  Capillary Refill: Brisk,< 3 seconds   Peripheral Pulses: +2 palpable, equal bilaterally       Labs:   Recent Labs     07/19/22  1438 07/20/22  0633   WBC 10.3 8.3   HGB 13.2 11.1*   HCT 40.1 33.3*    349     Recent Labs     07/19/22  1438 07/20/22  0633   * 125*   K 5.2* 3.9   CL 83* 86*   CO2 28 27   BUN 14 15   CREATININE 0.6 0.6   CALCIUM 9.8 8.6     Recent Labs     07/19/22  1438 07/20/22  0633   * 147*   ALT 38 33   BILITOT 1.1* 0.8   ALKPHOS 890* 757*     Recent Labs     07/19/22  1438   INR 1.09     No results for input(s): Jinny Risk in the last 72 hours. Urinalysis:      Lab Results   Component Value Date/Time    NITRU POSITIVE 11/07/2019 04:45 PM    WBCUA  11/07/2019 04:45 PM    BACTERIA Rare 11/07/2019 04:45 PM    RBCUA 20-50 11/07/2019 04:45 PM    BLOODU LARGE 11/07/2019 04:45 PM    SPECGRAV 1.020 11/07/2019 04:45 PM    GLUCOSEU 100 11/07/2019 04:45 PM       Radiology:  CT CHEST ABDOMEN PELVIS W CONTRAST   Final Result   1. Large left upper lobe mass abutting the fissure and extending to the hilum   measuring 6.0 x 4.2 cm is consistent with known malignancy. 2. Large filling defect in the right interlobar pulmonary artery consistent   with acute pulmonary embolism and there may be smaller filling defects in   branch vessels. No definite right heart strain. 3. Moderate emphysema. A nodule in the medial right lower lobe measuring 1.0   x 0.8 cm could also be malignant and there are additional scattered tiny   nodules. 4. Diffuse hepatic metastatic disease. 5. Tumor thrombus extends into the intrahepatic IVC and common iliac veins   with possible extension into the external iliac veins but no definite   extension into the renal veins. 6. Prominent retrocrural, right upper quadrant, and retroperitoneal lymph   nodes also suspicious for metastatic disease. Findings were given to JAMEY Sanon in the ED on 7/19/2022 at 5:38 p.m.          MRI BRAIN W WO CONTRAST    (Results Pending)           Assessment/Plan:    Active Hospital Problems    Diagnosis Date Noted    Acute pulmonary embolism without acute cor pulmonale, unspecified pulmonary embolism type ([address] [I26.99] 07/19/2022 Priority: Medium    Metastatic non-small cell lung cancer ([facility] [C34.90] 07/13/2022     Priority: Medium       Acute Pulmonary Embolism with thromboembolism of IVC  - CT chest: Large left upper lobe mass abutting the fissure and extending to the hilum measuring 6.0 x 4.2 cm consistent with known malignancy. There is a large filling defect in the right interlobar pulmonary artery consistent with acute pulmonary embolism with possibility of smaller filling defects in the branch vessels. No definitive heart strain. There is tumor thrombus extending into the intrahepatic IVC and common iliac veins  - Continue heparin gtt  - Echo - assess for heart strain  - bilateral lower ext doppler  - cardiology consulted to address IVC tumor thrombus     Metastatic non-small cell lung cancer   - newly dx, awaiting consult with oncology  - CT showing multiple lung nodules, diffuse hepatic metastatic disease with tumor thrombus extending into intrahepatic IVC and common iliac veins with possible extension into external iliac veins - no definite extension into renal veins. Prominent retrocrural, right upper quadrant and retroperitoneal lymph nodes suspicious for metastatic disease  - on heparin gtt  - [facility] consulted in ED  - MRI Brain with and without contrast ordered for staging completeness  - general surgery consulted for port placement    Hyponatremia  - check urine sodium, osm; serum osm    Tobacco abuse  - smoking cessation counseling provided  - nicotine patches offered    DVT Prophylaxis: heparin gtt  Diet: ADULT DIET;  Regular  Diet NPO  Code Status: Full Code    PT/OT Eval Status: defer    Dispo - continue care    Letitia Vigil MD

## 2022-07-20 NOTE — PROGRESS NOTES
Pt. A/O x4 resting in bed this AM. VSS. Assessment complete. Heparin running 5-250 ml/h. IV flushed. No complications at this time. Pt. tolerated PO intake with no complaints of N/V. Pt. Ambulated to the bathroom x1 w/ cane this AM without complications. Complains of pain 7/10 - PRN medication administered (see MAR). MRI discussed with patient; checklist complete. Pt. Able to make needs known with no further complaints at this time. Fall precautions remain in place, bed alarm on, call light within reach. Will continue to monitor and assess.  Electronically signed by Neyda Banerjee RN on 7/20/2022 at 11:14 AM

## 2022-07-20 NOTE — CONSULTS
Oncology Consult    See dictation    A/P:  Stage 4 NSCLC. I had a very long discussion with the patient. I will start combined chemo and immunotherapy as an outpatient. Her disease is not curable but is treatable. I will get a brain MRI for staging. I will consult surgery for Mediport. Pulmonary embolus. I will continue heparin until no further procedures are planned. At that time, she can be switched to Eliquis. Tumor thrombus in IVC. This is where her pain is. I will consult cardiology. Thank you for the consultation. I will follow closely.     Gabby Mojica MD

## 2022-07-21 ENCOUNTER — APPOINTMENT (OUTPATIENT)
Dept: GENERAL RADIOLOGY | Age: 67
DRG: 166 | End: 2022-07-21
Payer: OTHER GOVERNMENT

## 2022-07-21 ENCOUNTER — ANESTHESIA (OUTPATIENT)
Dept: OPERATING ROOM | Age: 67
DRG: 166 | End: 2022-07-21
Payer: OTHER GOVERNMENT

## 2022-07-21 LAB
A/G RATIO: 1.3 (ref 1.1–2.2)
ALBUMIN SERPL-MCNC: 3.3 G/DL (ref 3.4–5)
ALP BLD-CCNC: 790 U/L (ref 40–129)
ALT SERPL-CCNC: 52 U/L (ref 10–40)
ANION GAP SERPL CALCULATED.3IONS-SCNC: 11 MMOL/L (ref 3–16)
APTT: 48.4 SEC (ref 23–34.3)
APTT: 87.5 SEC (ref 23–34.3)
AST SERPL-CCNC: 227 U/L (ref 15–37)
BASOPHILS ABSOLUTE: 0.1 K/UL (ref 0–0.2)
BASOPHILS RELATIVE PERCENT: 1.4 %
BILIRUB SERPL-MCNC: 0.7 MG/DL (ref 0–1)
BUN BLDV-MCNC: 16 MG/DL (ref 7–20)
CALCIUM SERPL-MCNC: 9.4 MG/DL (ref 8.3–10.6)
CHLORIDE BLD-SCNC: 88 MMOL/L (ref 99–110)
CO2: 30 MMOL/L (ref 21–32)
CREAT SERPL-MCNC: 0.6 MG/DL (ref 0.6–1.2)
EOSINOPHILS ABSOLUTE: 0 K/UL (ref 0–0.6)
EOSINOPHILS RELATIVE PERCENT: 0.5 %
GFR AFRICAN AMERICAN: >60
GFR NON-AFRICAN AMERICAN: >60
GLUCOSE BLD-MCNC: 92 MG/DL (ref 70–99)
HCT VFR BLD CALC: 35.2 % (ref 36–48)
HEMOGLOBIN: 11.8 G/DL (ref 12–16)
LYMPHOCYTES ABSOLUTE: 2.2 K/UL (ref 1–5.1)
LYMPHOCYTES RELATIVE PERCENT: 28.9 %
MCH RBC QN AUTO: 29.6 PG (ref 26–34)
MCHC RBC AUTO-ENTMCNC: 33.6 G/DL (ref 31–36)
MCV RBC AUTO: 88.1 FL (ref 80–100)
MONOCYTES ABSOLUTE: 0.8 K/UL (ref 0–1.3)
MONOCYTES RELATIVE PERCENT: 10 %
NEUTROPHILS ABSOLUTE: 4.5 K/UL (ref 1.7–7.7)
NEUTROPHILS RELATIVE PERCENT: 59.2 %
PDW BLD-RTO: 16.6 % (ref 12.4–15.4)
PLATELET # BLD: 381 K/UL (ref 135–450)
PMV BLD AUTO: 7.9 FL (ref 5–10.5)
POTASSIUM SERPL-SCNC: 5.2 MMOL/L (ref 3.5–5.1)
RBC # BLD: 3.99 M/UL (ref 4–5.2)
SODIUM BLD-SCNC: 129 MMOL/L (ref 136–145)
TOTAL PROTEIN: 5.9 G/DL (ref 6.4–8.2)
WBC # BLD: 7.6 K/UL (ref 4–11)

## 2022-07-21 PROCEDURE — 3600000013 HC SURGERY LEVEL 3 ADDTL 15MIN: Performed by: SURGERY

## 2022-07-21 PROCEDURE — C1788 PORT, INDWELLING, IMP: HCPCS | Performed by: SURGERY

## 2022-07-21 PROCEDURE — 6370000000 HC RX 637 (ALT 250 FOR IP): Performed by: SURGERY

## 2022-07-21 PROCEDURE — 2580000003 HC RX 258: Performed by: SURGERY

## 2022-07-21 PROCEDURE — 3600000003 HC SURGERY LEVEL 3 BASE: Performed by: SURGERY

## 2022-07-21 PROCEDURE — A4217 STERILE WATER/SALINE, 500 ML: HCPCS | Performed by: SURGERY

## 2022-07-21 PROCEDURE — 1200000000 HC SEMI PRIVATE

## 2022-07-21 PROCEDURE — 6360000002 HC RX W HCPCS

## 2022-07-21 PROCEDURE — 71045 X-RAY EXAM CHEST 1 VIEW: CPT

## 2022-07-21 PROCEDURE — 97530 THERAPEUTIC ACTIVITIES: CPT

## 2022-07-21 PROCEDURE — 97162 PT EVAL MOD COMPLEX 30 MIN: CPT

## 2022-07-21 PROCEDURE — 77001 FLUOROGUIDE FOR VEIN DEVICE: CPT | Performed by: SURGERY

## 2022-07-21 PROCEDURE — 3700000000 HC ANESTHESIA ATTENDED CARE: Performed by: SURGERY

## 2022-07-21 PROCEDURE — 6370000000 HC RX 637 (ALT 250 FOR IP): Performed by: NURSE PRACTITIONER

## 2022-07-21 PROCEDURE — B5181ZA FLUOROSCOPY OF SUPERIOR VENA CAVA USING LOW OSMOLAR CONTRAST, GUIDANCE: ICD-10-PCS | Performed by: SURGERY

## 2022-07-21 PROCEDURE — 36561 INSERT TUNNELED CV CATH: CPT | Performed by: SURGERY

## 2022-07-21 PROCEDURE — 6370000000 HC RX 637 (ALT 250 FOR IP): Performed by: INTERNAL MEDICINE

## 2022-07-21 PROCEDURE — 85730 THROMBOPLASTIN TIME PARTIAL: CPT

## 2022-07-21 PROCEDURE — 6360000002 HC RX W HCPCS: Performed by: SURGERY

## 2022-07-21 PROCEDURE — 2500000003 HC RX 250 WO HCPCS: Performed by: SURGERY

## 2022-07-21 PROCEDURE — 2580000003 HC RX 258: Performed by: NURSE PRACTITIONER

## 2022-07-21 PROCEDURE — 94760 N-INVAS EAR/PLS OXIMETRY 1: CPT

## 2022-07-21 PROCEDURE — 97116 GAIT TRAINING THERAPY: CPT

## 2022-07-21 PROCEDURE — 97166 OT EVAL MOD COMPLEX 45 MIN: CPT

## 2022-07-21 PROCEDURE — 2709999900 HC NON-CHARGEABLE SUPPLY: Performed by: SURGERY

## 2022-07-21 PROCEDURE — 6360000002 HC RX W HCPCS: Performed by: INTERNAL MEDICINE

## 2022-07-21 PROCEDURE — 2500000003 HC RX 250 WO HCPCS

## 2022-07-21 PROCEDURE — 2500000003 HC RX 250 WO HCPCS: Performed by: NURSE PRACTITIONER

## 2022-07-21 PROCEDURE — 83930 ASSAY OF BLOOD OSMOLALITY: CPT

## 2022-07-21 PROCEDURE — 0JH60WZ INSERTION OF TOTALLY IMPLANTABLE VASCULAR ACCESS DEVICE INTO CHEST SUBCUTANEOUS TISSUE AND FASCIA, OPEN APPROACH: ICD-10-PCS | Performed by: SURGERY

## 2022-07-21 PROCEDURE — 97535 SELF CARE MNGMENT TRAINING: CPT

## 2022-07-21 PROCEDURE — 3700000001 HC ADD 15 MINUTES (ANESTHESIA): Performed by: SURGERY

## 2022-07-21 PROCEDURE — 80053 COMPREHEN METABOLIC PANEL: CPT

## 2022-07-21 PROCEDURE — 85025 COMPLETE CBC W/AUTO DIFF WBC: CPT

## 2022-07-21 PROCEDURE — 36415 COLL VENOUS BLD VENIPUNCTURE: CPT

## 2022-07-21 PROCEDURE — 77001 FLUOROGUIDE FOR VEIN DEVICE: CPT

## 2022-07-21 PROCEDURE — 02HV33Z INSERTION OF INFUSION DEVICE INTO SUPERIOR VENA CAVA, PERCUTANEOUS APPROACH: ICD-10-PCS | Performed by: SURGERY

## 2022-07-21 PROCEDURE — 7100000000 HC PACU RECOVERY - FIRST 15 MIN: Performed by: SURGERY

## 2022-07-21 PROCEDURE — 7100000001 HC PACU RECOVERY - ADDTL 15 MIN: Performed by: SURGERY

## 2022-07-21 DEVICE — PORT INFUS L66CM 0.015ML 0.7ML CATH OD2.5MM ID1.4MM INTRO: Type: IMPLANTABLE DEVICE | Site: CHEST | Status: FUNCTIONAL

## 2022-07-21 RX ORDER — CYANOCOBALAMIN 1000 UG/ML
1000 INJECTION INTRAMUSCULAR; SUBCUTANEOUS ONCE
Status: COMPLETED | OUTPATIENT
Start: 2022-07-21 | End: 2022-07-21

## 2022-07-21 RX ORDER — PROPOFOL 10 MG/ML
INJECTION, EMULSION INTRAVENOUS PRN
Status: DISCONTINUED | OUTPATIENT
Start: 2022-07-21 | End: 2022-07-21

## 2022-07-21 RX ORDER — HEPARIN SODIUM (PORCINE) LOCK FLUSH IV SOLN 100 UNIT/ML 100 UNIT/ML
SOLUTION INTRAVENOUS
Status: COMPLETED | OUTPATIENT
Start: 2022-07-21 | End: 2022-07-21

## 2022-07-21 RX ORDER — PROPOFOL 10 MG/ML
INJECTION, EMULSION INTRAVENOUS CONTINUOUS PRN
Status: DISCONTINUED | OUTPATIENT
Start: 2022-07-21 | End: 2022-07-21 | Stop reason: SDUPTHER

## 2022-07-21 RX ORDER — HEPARIN SODIUM 10000 [USP'U]/100ML
0-3000 INJECTION, SOLUTION INTRAVENOUS CONTINUOUS
Status: DISCONTINUED | OUTPATIENT
Start: 2022-07-21 | End: 2022-07-22

## 2022-07-21 RX ORDER — HEPARIN SODIUM 1000 [USP'U]/ML
5400 INJECTION, SOLUTION INTRAVENOUS; SUBCUTANEOUS ONCE
Status: COMPLETED | OUTPATIENT
Start: 2022-07-21 | End: 2022-07-21

## 2022-07-21 RX ORDER — LIDOCAINE HYDROCHLORIDE 20 MG/ML
INJECTION, SOLUTION EPIDURAL; INFILTRATION; INTRACAUDAL; PERINEURAL PRN
Status: DISCONTINUED | OUTPATIENT
Start: 2022-07-21 | End: 2022-07-21 | Stop reason: SDUPTHER

## 2022-07-21 RX ORDER — FOLIC ACID 1 MG/1
1 TABLET ORAL DAILY
Status: DISCONTINUED | OUTPATIENT
Start: 2022-07-21 | End: 2022-07-27 | Stop reason: HOSPADM

## 2022-07-21 RX ORDER — SODIUM CHLORIDE 9 MG/ML
INJECTION, SOLUTION INTRAVENOUS PRN
Status: CANCELLED | OUTPATIENT
Start: 2022-07-21

## 2022-07-21 RX ORDER — ONDANSETRON 2 MG/ML
16 INJECTION INTRAMUSCULAR; INTRAVENOUS ONCE
Status: DISCONTINUED | OUTPATIENT
Start: 2022-07-22 | End: 2022-07-21 | Stop reason: CLARIF

## 2022-07-21 RX ORDER — FENTANYL CITRATE 50 UG/ML
INJECTION, SOLUTION INTRAMUSCULAR; INTRAVENOUS PRN
Status: DISCONTINUED | OUTPATIENT
Start: 2022-07-21 | End: 2022-07-21 | Stop reason: SDUPTHER

## 2022-07-21 RX ORDER — PHENYLEPHRINE HCL IN 0.9% NACL 1 MG/10 ML
SYRINGE (ML) INTRAVENOUS PRN
Status: DISCONTINUED | OUTPATIENT
Start: 2022-07-21 | End: 2022-07-21 | Stop reason: SDUPTHER

## 2022-07-21 RX ORDER — MAGNESIUM HYDROXIDE 1200 MG/15ML
LIQUID ORAL CONTINUOUS PRN
Status: COMPLETED | OUTPATIENT
Start: 2022-07-21 | End: 2022-07-21

## 2022-07-21 RX ORDER — PROPOFOL 10 MG/ML
INJECTION, EMULSION INTRAVENOUS PRN
Status: DISCONTINUED | OUTPATIENT
Start: 2022-07-21 | End: 2022-07-21 | Stop reason: SDUPTHER

## 2022-07-21 RX ORDER — SODIUM CHLORIDE 0.9 % (FLUSH) 0.9 %
5-40 SYRINGE (ML) INJECTION EVERY 12 HOURS SCHEDULED
Status: CANCELLED | OUTPATIENT
Start: 2022-07-21

## 2022-07-21 RX ORDER — ONDANSETRON 2 MG/ML
4 INJECTION INTRAMUSCULAR; INTRAVENOUS
Status: CANCELLED | OUTPATIENT
Start: 2022-07-21 | End: 2022-07-21

## 2022-07-21 RX ORDER — SODIUM CHLORIDE 0.9 % (FLUSH) 0.9 %
5-40 SYRINGE (ML) INJECTION PRN
Status: CANCELLED | OUTPATIENT
Start: 2022-07-21

## 2022-07-21 RX ADMIN — FENTANYL CITRATE 25 MCG: 50 INJECTION INTRAMUSCULAR; INTRAVENOUS at 13:28

## 2022-07-21 RX ADMIN — FOLIC ACID 1 MG: 1 TABLET ORAL at 16:48

## 2022-07-21 RX ADMIN — HEPARIN SODIUM 5400 UNITS: 1000 INJECTION INTRAVENOUS; SUBCUTANEOUS at 18:59

## 2022-07-21 RX ADMIN — CYANOCOBALAMIN 1000 MCG: 1000 INJECTION, SOLUTION INTRAMUSCULAR; SUBCUTANEOUS at 11:04

## 2022-07-21 RX ADMIN — SODIUM CHLORIDE: 9 INJECTION, SOLUTION INTRAVENOUS at 13:09

## 2022-07-21 RX ADMIN — OXYCODONE AND ACETAMINOPHEN 2 TABLET: 5; 325 TABLET ORAL at 22:11

## 2022-07-21 RX ADMIN — Medication 100 MCG: at 13:45

## 2022-07-21 RX ADMIN — PROPOFOL 100 MCG/KG/MIN: 10 INJECTION, EMULSION INTRAVENOUS at 13:15

## 2022-07-21 RX ADMIN — LIDOCAINE HYDROCHLORIDE 100 MG: 20 INJECTION, SOLUTION EPIDURAL; INFILTRATION; INTRACAUDAL; PERINEURAL at 13:15

## 2022-07-21 RX ADMIN — FENTANYL CITRATE 25 MCG: 50 INJECTION INTRAMUSCULAR; INTRAVENOUS at 13:12

## 2022-07-21 RX ADMIN — Medication 10.5 MG: at 22:00

## 2022-07-21 RX ADMIN — OXYCODONE AND ACETAMINOPHEN 2 TABLET: 5; 325 TABLET ORAL at 10:58

## 2022-07-21 RX ADMIN — CEFAZOLIN 2000 MG: 2 INJECTION, POWDER, FOR SOLUTION INTRAMUSCULAR; INTRAVENOUS at 13:15

## 2022-07-21 RX ADMIN — Medication 50 MCG: at 13:39

## 2022-07-21 RX ADMIN — SODIUM CHLORIDE, PRESERVATIVE FREE 10 ML: 5 INJECTION INTRAVENOUS at 21:59

## 2022-07-21 RX ADMIN — SODIUM CHLORIDE, PRESERVATIVE FREE 10 ML: 5 INJECTION INTRAVENOUS at 09:30

## 2022-07-21 RX ADMIN — PROPOFOL 40 MG: 10 INJECTION, EMULSION INTRAVENOUS at 13:15

## 2022-07-21 RX ADMIN — OXYCODONE AND ACETAMINOPHEN 2 TABLET: 5; 325 TABLET ORAL at 04:52

## 2022-07-21 RX ADMIN — HEPARIN SODIUM 1610 UNITS/HR: 10000 INJECTION, SOLUTION INTRAVENOUS at 04:52

## 2022-07-21 RX ADMIN — HEPARIN SODIUM 1610 UNITS/HR: 10000 INJECTION, SOLUTION INTRAVENOUS at 19:05

## 2022-07-21 RX ADMIN — Medication 50 MCG: at 13:25

## 2022-07-21 RX ADMIN — OXYCODONE AND ACETAMINOPHEN 2 TABLET: 5; 325 TABLET ORAL at 16:48

## 2022-07-21 ASSESSMENT — PAIN SCALES - GENERAL
PAINLEVEL_OUTOF10: 8
PAINLEVEL_OUTOF10: 8
PAINLEVEL_OUTOF10: 3
PAINLEVEL_OUTOF10: 8
PAINLEVEL_OUTOF10: 10
PAINLEVEL_OUTOF10: 7
PAINLEVEL_OUTOF10: 7

## 2022-07-21 ASSESSMENT — PAIN DESCRIPTION - PAIN TYPE
TYPE: ACUTE PAIN
TYPE: ACUTE PAIN

## 2022-07-21 ASSESSMENT — PAIN DESCRIPTION - LOCATION
LOCATION: ABDOMEN
LOCATION: ABDOMEN;LEG
LOCATION: ABDOMEN

## 2022-07-21 ASSESSMENT — PAIN DESCRIPTION - DESCRIPTORS
DESCRIPTORS: SHARP;SPASM
DESCRIPTORS: ACHING
DESCRIPTORS: ACHING;SHARP;POUNDING
DESCRIPTORS: ACHING;SHARP

## 2022-07-21 ASSESSMENT — PAIN DESCRIPTION - ORIENTATION
ORIENTATION: RIGHT;LEFT
ORIENTATION: RIGHT
ORIENTATION: UPPER;RIGHT
ORIENTATION: UPPER;RIGHT

## 2022-07-21 ASSESSMENT — PAIN DESCRIPTION - FREQUENCY
FREQUENCY: CONTINUOUS
FREQUENCY: CONTINUOUS

## 2022-07-21 ASSESSMENT — PAIN DESCRIPTION - ONSET: ONSET: ON-GOING

## 2022-07-21 ASSESSMENT — PAIN - FUNCTIONAL ASSESSMENT
PAIN_FUNCTIONAL_ASSESSMENT: PREVENTS OR INTERFERES SOME ACTIVE ACTIVITIES AND ADLS
PAIN_FUNCTIONAL_ASSESSMENT: ACTIVITIES ARE NOT PREVENTED
PAIN_FUNCTIONAL_ASSESSMENT: PREVENTS OR INTERFERES SOME ACTIVE ACTIVITIES AND ADLS
PAIN_FUNCTIONAL_ASSESSMENT: PREVENTS OR INTERFERES SOME ACTIVE ACTIVITIES AND ADLS

## 2022-07-21 ASSESSMENT — ENCOUNTER SYMPTOMS: SHORTNESS OF BREATH: 0

## 2022-07-21 NOTE — PROGRESS NOTES
More awake this morning. Assisted to the bathroom using the Moon. Assisted with AM ADLs including bathing with Chlorhexidine wipes in preparation for surgery today. Teeth brushed, linens changed. Assisted back to bed and positioned for comfort. C/O right side pain 10/10. Pain/Discomfort is being managed with PRN analgesics per MD orders (See MAR). Patient is able to express and rate pain using numerical scale. Bed in lowest position and locked. Exit alarms in place. Non slip socks on. Call light and needed items in reach.

## 2022-07-21 NOTE — H&P
Preoperative History and Physical Update    H&P from 7/19/2022 was reviewed    No changes noted today. Plan for chemotherapy noted. PE  Alert and oriented  No chest wall abnormalities    A/P  Metastatic lung cancer  For port today    The risks, benefits and alternatives to the planned procedure were discussed. Patient expressed an understanding and is willing to proceed.     Electronically signed by Shahzad Crow MD on 7/21/2022 at 1:07 PM

## 2022-07-21 NOTE — PROGRESS NOTES
Hematology Oncology Daily Progress Note    Admit Date: 7/19/2022  Hospital day several    Subjective:     Patient has complaints of slowly improving abdominal pain--denies sob/cp. Medication side effects: none    Scheduled Meds:   nicotine  1 patch TransDERmal Q24H    melatonin  10.5 mg Oral Nightly    sodium chloride flush  5-40 mL IntraVENous 2 times per day     Continuous Infusions:   [Held by provider] heparin (PORCINE) Infusion 1,610 Units/hr (07/21/22 0452)    sodium chloride       PRN Meds:sodium chloride flush, sodium chloride, ondansetron **OR** ondansetron, polyethylene glycol, acetaminophen **OR** acetaminophen, perflutren lipid microspheres, morphine **OR** morphine, oxyCODONE-acetaminophen **OR** oxyCODONE-acetaminophen    Review of Systems  Pertinent items are noted in HPI. REVIEW OF SYSTEMS:         Constitutional: Denies fever, sweats, weight loss     Eyes: No visual changes or diplopia. No scleral icterus. ENT: No Headaches, hearing loss or vertigo. No mouth sores or sore throat. Cardiovascular: No chest pain, dyspnea on exertion, palpitations or loss of consciousness. Respiratory: No cough or wheezing, no sputum production. No hemoptysis. .    Gastrointestinal: No abdominal pain, appetite loss, blood in stools. No change in bowel habits. Genitourinary: No dysuria, trouble voiding, or hematuria. Musculoskeletal:  Generalized weakness. No joint complaints. Integumentary: No rash or pruritis. Neurological: No headache, diplopia. No change in gait, balance, or coordination. No paresthesias. Endocrine: No temperature intolerance. No excessive thirst, fluid intake, or urination. Hematologic/Lymphatic: No abnormal bruising or ecchymoses, blood clots or swollen lymph nodes. Allergic/Immunologic: No nasal congestion or hives.        Objective:     Patient Vitals for the past 8 hrs:   BP Temp Temp src Pulse Resp SpO2 Weight   07/21/22 0920 -- -- -- 77 -- -- --   07/21/22 0746 98/60 97.9 °F (36.6 °C) Oral 77 16 93 % --   07/21/22 0522 -- -- -- -- 20 -- --   07/21/22 0452 -- -- -- -- 20 -- --   07/21/22 0405 96/65 97.5 °F (36.4 °C) Oral 85 18 94 % 152 lb 1.9 oz (69 kg)     I/O last 3 completed shifts: In: 502.9 [P.O.:360; I.V.:142.9]  Out: -   I/O this shift:  In: 10 [I.V.:10]  Out: -     BP 98/60   Pulse 77   Temp 97.9 °F (36.6 °C) (Oral)   Resp 16   Ht 5' 7\" (1.702 m)   Wt 152 lb 1.9 oz (69 kg)   SpO2 93%   BMI 23.82 kg/m²     General Appearance:    Alert, cooperative, no distress, appears stated age   Head:    Normocephalic, without obvious abnormality, atraumatic   Eyes:    PERRL, conjunctiva/corneas clear, EOM's intact, fundi     benign, both eyes        Ears:    Normal TM's and external ear canals, both ears   Nose:   Nares normal, septum midline, mucosa normal, no drainage    or sinus tenderness   Throat:   Lips, mucosa, and tongue normal; teeth and gums normal   Neck:   Supple, symmetrical, trachea midline, no adenopathy;        thyroid:  No enlargement/tenderness/nodules; no carotid    bruit or JVD   Back:     Symmetric, no curvature, ROM normal, no CVA tenderness   Lungs:     Clear to auscultation bilaterally, respirations unlabored   Chest wall:    No tenderness or deformity   Heart:    Regular rate and rhythm, S1 and S2 normal, no murmur, rub   or gallop   Abdomen:     Soft, non-tender, bowel sounds active all four quadrants,     no masses, no organomegaly           Extremities:   Extremities normal, atraumatic, no cyanosis or edema   Pulses:   2+ and symmetric all extremities   Skin:   Skin color, texture, turgor normal, no rashes or lesions   Lymph nodes:   Cervical, supraclavicular, and axillary nodes normal   Neurologic:   CNII-XII intact.  Normal strength, sensation and reflexes       throughout       Data ReviewCBC:   Lab Results   Component Value Date/Time    WBC 7.6 07/21/2022 04:20 AM    RBC 3.99 07/21/2022 04:20 AM       Assessment:     Principal Problem:    Acute pulmonary embolism without acute cor pulmonale, unspecified pulmonary embolism type (HCC)  Active Problems:    Metastatic non-small cell lung cancer ([address]  Resolved Problems:    * No resolved hospital problems. *      Plan:     1. Stage IV non-small cell lung cancer\abdominal pain. Due to her significant disease and symptoms, I will start inpatient chemotherapy with carboplatin plus Alimta. I explained the potential side effects of chemotherapy including nausea, vomiting, hearing loss, hair loss, renal failure, life-threatening infections, and even death. I will start G53 and folic acid supplementation. I will add in Maize as an outpatient. I will have Caris testing done on her biopsy. I reviewed her brain MRI which was negative. Cardiology reviewed her case and does not feel that a suction thrombectomy is warranted. She will get palliative radiation therapy to her right leg. 2.  IVC thrombosis. Part of this is tumor thrombosis. Once no more procedures are planned, she can be switched from heparin to Eliquis.         Electronically signed by Patricia Arizmendi MD on 7/21/2022 at 9:51 AM

## 2022-07-21 NOTE — PROGRESS NOTES
Pt. A/O x4. VSS with family at bedside. PT/OT ambulated pt to chair this AM walker x1 but remained in bed the remainder of the morning. Pt NPO in preparation for port placement this afternoon. No complaints of N/V. Pt. Complains of some pain - PRN medication administered per MAR. Heparin drip stopped. Not currently running at this time per MD order. Fall precautions remain in place. Call light and belongings within reach. Bed in lowest position. Will continue to monitor and assess per unit protocol.  Electronically signed by Kristofer Hernandez RN on 7/21/2022 at 12:17 PM

## 2022-07-21 NOTE — PROGRESS NOTES
Clinical Pharmacy Note  Heparin Dosing       Lab Results   Component Value Date/Time    APTT 87.5 07/21/2022 04:20 AM     Lab Results   Component Value Date/Time    HGB 11.1 07/20/2022 06:33 AM    HCT 33.3 07/20/2022 06:33 AM     07/20/2022 06:33 AM    INR 1.09 07/19/2022 02:38 PM       Current Infusion Rate: 1610 units/hr    Plan:  Rate: Continue 1610 units/hr  Next aPTT: 1030  7/21/22    Pharmacy will continue to monitor and adjust based on aPTT results.     Saad Sethi, [address] [facility]  7/21/2022 6:38 AM

## 2022-07-21 NOTE — ANESTHESIA POSTPROCEDURE EVALUATION
Department of Anesthesiology  Postprocedure Note    Patient: Maximo Rosas  MRN: [id number]  YOB: 1955  Date of evaluation: 7/21/2022      Procedure Summary     Date: 07/21/22 Room / Location: [address]    Anesthesia Start: 1310 Anesthesia Stop: 7582    Procedure: PORT A CATHETER PLACEMENT WITH FLUOROSCOPY (Chest) Diagnosis:       Malignant neoplasm of lung, unspecified laterality, unspecified part of lung ([address]      (LUNG CANCER)    Surgeons: Bonny Torres MD Responsible Provider: Damon Francis MD    Anesthesia Type: MAC ASA Status: 3          Anesthesia Type: No value filed.     Tien Phase I: Tien Score: 9    Tien Phase II:        Anesthesia Post Evaluation    Patient location during evaluation: bedside  Patient participation: complete - patient participated  Level of consciousness: awake and alert  Pain score: 0  Nausea & Vomiting: no nausea  Complications: no  Cardiovascular status: hemodynamically stable  Respiratory status: acceptable  Hydration status: stable

## 2022-07-21 NOTE — ANESTHESIA PRE PROCEDURE
[facility] Department of Anesthesiology  Pre-Anesthesia Evaluation/Consultation       Name:  Abril Gaspar  : 1955  Age:  74 y.o. 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, including shoulder    Seborrheic keratosis    Migraine    Osteopenia determined by x-ray    Abnormal mammogram    SCC (squamous cell carcinoma)    Tobacco abuse    Mild intermittent asthma without complication    Breast implant status    Osteopenia of multiple sites    Panlobular emphysema ([address]    Kyphosis of thoracic region    Mass of right lower extremity    Benign neoplasm of soft tissue of leg, right    Metastatic non-small cell lung cancer ([address]    Acute pulmonary embolism without acute cor pulmonale, unspecified pulmonary embolism type ([address]     Past Medical History:   Diagnosis Date    Headache      Past Surgical History:   Procedure Laterality Date    APPENDECTOMY      TONSILLECTOMY       Social History     Tobacco Use    Smoking status: Every Day     Packs/day: 1.00     Years: 50.00     Pack years: 50.00     Types: Cigarettes    Smokeless tobacco: Never   Substance Use Topics    Alcohol use: No    Drug use: No     Medications  No current facility-administered medications on file prior to encounter.      Current Outpatient Medications on File Prior to Encounter   Medication Sig Dispense Refill    doxyLAMINE succinate (GNP SLEEP AID) 25 MG tablet Take 50 mg by mouth nightly      Melatonin 10 MG TABS Take 10 mg by mouth at bedtime      furosemide (LASIX) 20 MG tablet Take 1 tablet by mouth daily (Patient not taking: Reported on 2022) 7 tablet 0    SUMAtriptan (IMITREX) 100 MG tablet Take 1 tablet by mouth daily as needed for Migraine 9 tablet 11     Current Facility-Administered Medications   Medication Dose Route Frequency Provider Last Rate Last Admin    folic acid (FOLVITE) tablet 1 mg  1 mg Oral Daily [facility] CARBOplatin (PARAPLATIN) 362 mg in sodium chloride 0.9 % 100 mL chemo IVPB  200 mg/m2 IntraVENous Once Kendall Apodaca MD       [facility] ON 7/22/2022] PEMEtrexed (ALIMTA) 900 mg in sodium chloride 0.9 % 100 mL chemo infusion  900 mg IntraVENous Once Kendall Apodaca MD       [facility] ON 7/22/2022] ondansetron (ZOFRAN) 16 mg, dexamethasone (DECADRON) 12 mg in sodium chloride 0.9 % 50 mL IVPB  16 mg IntraVENous Once Kendall Apodaca MD        ceFAZolin (ANCEF) 2,000 mg in dextrose 5 % 50 mL IVPB (mini-bag)  2,000 mg IntraVENous Once Norma Miles MD        nicotine (NICODERM CQ) 14 MG/24HR 1 patch  1 patch TransDERmal Q24H Audley Lombard, MD   1 patch at 07/21/22 1100    [Held by provider] heparin 25,000 unit in sodium chloride 0.45% 250 mL (premix) infusion  0-3,000 Units/hr IntraVENous Continuous Diana Section, APRN - CNP 16.1 mL/hr at 07/21/22 0452 1,610 Units/hr at 07/21/22 0452    melatonin tablet 10.5 mg  10.5 mg Oral Nightly Diana Section, APRN - CNP   10.5 mg at 07/20/22 2206    sodium chloride flush 0.9 % injection 5-40 mL  5-40 mL IntraVENous 2 times per day Diana Section, APRN - CNP   10 mL at 07/21/22 0930    sodium chloride flush 0.9 % injection 5-40 mL  5-40 mL IntraVENous PRN Diana Section, APRN - CNP        0.9 % sodium chloride infusion   IntraVENous PRN Diana Section, APRN - CNP        ondansetron (ZOFRAN-ODT) disintegrating tablet 4 mg  4 mg Oral Q8H PRN Diana Section, APRN - CNP        Or    ondansetron (ZOFRAN) injection 4 mg  4 mg IntraVENous Q6H PRN Diana Section, APRN - CNP        polyethylene glycol (GLYCOLAX) packet 17 g  17 g Oral Daily PRN Diana Section, APRN - CNP   17 g at 22 1117    acetaminophen (TYLENOL) tablet 650 mg  650 mg Oral Q6H PRN Glena Celine, APRN - CNP        Or    acetaminophen (TYLENOL) suppository 650 mg  650 mg Rectal Q6H PRN Glena Celine, APRN - CNP        perflutren lipid microspheres (DEFINITY) injection 1.65 mg  1.5 mL IntraVENous ONCE PRN Glena Celine, APRN - CNP        morphine (PF) injection 2 mg  2 mg IntraVENous Q2H PRN Glena Celine, APRN - CNP        Or    morphine (PF) injection 4 mg  4 mg IntraVENous Q2H PRN Glena Celine, APRN - CNP        oxyCODONE-acetaminophen (PERCOCET) 5-325 MG per tablet 1 tablet  1 tablet Oral Q4H PRN Glena Celine, APRN - CNP        Or    oxyCODONE-acetaminophen (PERCOCET) 5-325 MG per tablet 2 tablet  2 tablet Oral Q4H PRN Glena Celine, APRN - CNP   2 tablet at 22 1058     Vital Signs (Current)   Vitals:    22 0920 22 1058 22 1143 22 1153   BP:   101/61    Pulse: 77  82    Resp:  16 18    Temp:   97.9 °F (36.6 °C)    TempSrc:   Oral    SpO2:   92% 92%   Weight:       Height:                                                Vital Signs Statistics (for past 48 hrs)     Temp  Av.8 °F (36.6 °C)  Min: 97.4 °F (36.3 °C)   Min taken time: 22 0512  Max: 98.2 °F (36.8 °C)   Max taken time: 22  Pulse  Av  Min: 68   Min taken time: 22 09  Max: 101   Max taken time: 22 191  Resp  Av.4  Min: 12   Min taken time: 22 223  Max: 34   Max taken time: 22 2030  BP  Min: 96/65   Min taken time: 22 0405  Max: 123/76   Max taken time: 22 0033  MAP (mmHg)  Av.6  Min: 67   Min taken time: 22 1735  Max: 92   Max taken time: 22 0033  SpO2  Av.5 %  Min: 89 %   Min taken time: 22 2350  Max: 98 %   Max taken time: 22 1830  BP Readings from Last 3 Encounters:   22 101/61   22 128/80   22 117/66       BMI  Body mass index is 23.82 kg/m².   Estimated body mass index is 23.82 Last 3 Encounters:       NPO Status:                          Anesthesia Evaluation  Patient summary reviewed and Nursing notes reviewed  Airway: Mallampati: II  TM distance: >3 FB   Neck ROM: full  Mouth opening: > = 3 FB   Dental:    (+) lower dentures and poor dentition      Pulmonary:   (+) COPD:  asthma:     (-) shortness of breath                           Cardiovascular:        (-) hypertension, valvular problems/murmurs, past MI, CAD, CABG/stent, dysrhythmias,  angina and no hyperlipidemia                Neuro/Psych:   (+) headaches: migraine headaches,    (-) seizures, TIA and CVA           GI/Hepatic/Renal:        (-) GERD, PUD, liver disease and no renal disease       Endo/Other:    (+) malignancy/cancer. (-) diabetes mellitus               Abdominal:             Vascular: negative vascular ROS. Other Findings:           Anesthesia Plan      MAC     ASA 3     (I discussed intravenous sedation to the patient's satisfaction including risks and alternatives. The patient agreed with the plan and has no further questions. Anna Clements MD )  Induction: intravenous. Anesthetic plan and risks discussed with patient. Plan discussed with CRNA. This pre-anesthesia assessment may be used as a history and physical.    DOS STAFF ADDENDUM:    Pt seen and examined, chart reviewed (including anesthesia, drug and allergy history). No interval changes to history and physical examination. Anesthetic plan, risks, benefits, alternatives, and personnel involved discussed with patient. Patient verbalized an understanding and agrees to proceed.       Anna Clements MD  July 21, 2022  12:12 PM

## 2022-07-21 NOTE — PLAN OF CARE
Problem: Discharge Planning  Goal: Discharge to home or other facility with appropriate resources  Flowsheets (Taken 7/21/2022 1717)  Discharge to home or other facility with appropriate resources:   Identify barriers to discharge with patient and caregiver   Identify discharge learning needs (meds, wound care, etc)   Refer to discharge planning if patient needs post-hospital services based on physician order or complex needs related to functional status, cognitive ability or social support system   Arrange for needed discharge resources and transportation as appropriate     Problem: Pain  Goal: Verbalizes/displays adequate comfort level or baseline comfort level  Flowsheets (Taken 7/21/2022 1717)  Verbalizes/displays adequate comfort level or baseline comfort level:   Encourage patient to monitor pain and request assistance   Assess pain using appropriate pain scale   Administer analgesics based on type and severity of pain and evaluate response   Implement non-pharmacological measures as appropriate and evaluate response     Problem: Safety - Adult  Goal: Free from fall injury  Flowsheets (Taken 7/21/2022 1717)  Free From Fall Injury: Instruct family/caregiver on patient safety     Problem: ABCDS Injury Assessment  Goal: Absence of physical injury  Flowsheets (Taken 7/21/2022 0239 by Jakob Mckinney, RN)  Absence of Physical Injury: Implement safety measures based on patient assessment  Note: Fall precautions in place. Bed in lowest position. Non-skid socks provided. Call light within reach. All belongings within reach. Pt educated to call for help before ambulating       Problem: Skin/Tissue Integrity  Goal: Absence of new skin breakdown  Description: 1. Monitor for areas of redness and/or skin breakdown  2. Assess vascular access sites hourly  3. Every 4-6 hours minimum:  Change oxygen saturation probe site  4.   Every 4-6 hours:  If on nasal continuous positive airway pressure, respiratory therapy assess nares and determine need for appliance change or resting period. Note: Assess skin per unit protocol. Monitor for new or increased redness or other signs of skin breakdown. Encourage pt to ambulate in bed and assist as needed. Dress and clean wounds per order.

## 2022-07-21 NOTE — PROGRESS NOTES
Pt arrived to PACU from OR. Pt asleep on room air, awakens easily. Right chest PAC dressing C/D/I. Pt denies c/o pain at this time.

## 2022-07-21 NOTE — CONSULTS
I have reviewed all the testing. Also discussed the case with hematology oncology. Discussed the case with vascular surgery Dr. Jhonatan James who has seen this patient in the past and outpatient setting. After detailed discussion in team meeting it was decided at this present time not to proceed with invasive thrombectomy given higher tumor burden. Continue antithrombotic therapy. Patient carries a grave prognosis. Thank you please call with any questions or concerns.     Isaac Barone MD

## 2022-07-21 NOTE — PROGRESS NOTES
PM Assessment completed. /67   Pulse 92   Temp 98.2 °F (36.8 °C) (Oral)   Resp 17   Ht 5' 7\" (1.702 m)   Wt 147 lb 1.8 oz (66.7 kg)   SpO2 90%   BMI 23.04 kg/m²     Alert and oriented. Resting in bed with eyes closed. Respirations easy unlabored. Denies pain. Calls appropriately. Plan of care and safety measures reviewed with the patient. Needed items including call light in reach and exit alarm in place.        Electronically signed by Lary Arnett RN on 7/20/2022 at 8:01 PM

## 2022-07-21 NOTE — PROGRESS NOTES
Pt. A/Ox4 upon return from surgery. VSS. More lethargic than before but awakens easily. Experienced some pain - PRN medication administered. Pt experienced increased difficulty swallowing large pills this evening. Tolerated whole one at a time. Pt ambulated x1 with a walker to the bathroom with no complications. Family at bedside at this time. Pt educated to call before ambulating and verbalizes understanding. Fall precautions in place. Pt resting in bed comfortably at this time with no further complaints. Will continue to monitor.  Electronically signed by Shital Thorne RN on 7/21/2022 at 6:55 PM

## 2022-07-21 NOTE — PLAN OF CARE
Problem: Discharge Planning  Goal: Discharge to home or other facility with appropriate resources  7/21/2022 0238 by Kiran Nolasco RN  Outcome: Progressing     Problem: Pain  Goal: Verbalizes/displays adequate comfort level or baseline comfort level  7/21/2022 0238 by Kiran Nolasco RN  Outcome: Progressing

## 2022-07-21 NOTE — BRIEF OP NOTE
Brief Postoperative Note      Patient: Luci Méndez  YOB: 1955  MRN: [id number]    Date of Procedure: 7/21/2022    Pre-Op Diagnosis: LUNG CANCER    Post-Op Diagnosis: Same       Procedure(s):  PORT A CATHETER PLACEMENT WITH FLUOROSCOPY, right subclavian    Surgeon(s):  Pankaj Cohen MD    Assistant:  Surgical Assistant: Remington Lantigua; Gil Gandara    Anesthesia: Monitor Anesthesia Care    Estimated Blood Loss (mL): less than 50     Complications: None    Specimens:   * No specimens in log *    Implants:  Implant Name Type Inv. Item Serial No.  Lot No. LRB No. Used Action   PORT INFUS L66CM 0.015ML 0.7ML CATH OD2.5MM ID1. 4MM INTRO - [id number]  PORT INFUS L66CM 0.015ML 0.7ML CATH OD2.5MM ID1. 4MM [employer] [id number] Right 1 Implanted         Drains: * No LDAs found *    Findings: no complications    Electronically signed by Claudia Ceron MD on 7/21/2022 at 1:48 PM

## 2022-07-21 NOTE — PROGRESS NOTES
Physical Therapy  Facility/Department: [facility] OR  Physical Therapy Initial Assessment  Co Treat with OT  This note serves as patient discharge summary if pt discharges prior to next PT visit    Name: Henrietta Aleman  : 1955  MRN: [id number]  Date of Service: 2022    Discharge Recommendations:  Continue to assess pending progress, Home with assist PRN, Patient would benefit from continued therapy after discharge, Therapy recommended at discharge, Home with Home health PT, 24 hour supervision or assist   Henrietta Aleman scored a 17/24 on the AM-PAC short mobility form. Current research shows that an AM-PAC score of 18 or greater is typically associated with a discharge to the patient's home setting. Based on the patient's AM-PAC score and their current functional mobility deficits, it is recommended that the patient have 2-3 sessions per week of Physical Therapy at d/c to increase the patient's independence. At this time, this patient demonstrates the endurance and safety to discharge home with home therapy services and a follow up treatment frequency of 2-3x/wk. Please see assessment section for further patient specific details. PT Equipment Recommendations  Equipment Needed: Yes  Mobility Devices: Chung Solis: Rolling      Patient Diagnosis(es): The primary encounter diagnosis was Acute pulmonary embolism without acute cor pulmonale, unspecified pulmonary embolism type ([address] 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 Past Medical History:  has a past medical history of Headache. Past Surgical History:  has a past surgical history that includes Appendectomy and Tonsillectomy. Assessment   Body Structures, Functions, Activity Limitations Requiring Skilled Therapeutic Intervention: Decreased functional mobility ; Decreased ROM; Decreased body mechanics; Decreased strength;Decreased endurance; Increased pain  Assessment: Per H and P: [place]ef y.o. female with recent diagnosis of metastatic non small cell lung cancer otherwise no PMHx presented to [facility] with right upper quadrant abdominal pain. Sent in by her PCP. Active issues: acute pulmonary embolism with thromboembolism of IVC (heparin started), and Non-small cell lung cancer with mets. Oncology, general surgery, and cardiology consulted. Prior Status: Pt reports independence in home with ADLs, IADLs, bed mobility, transfers and gait. 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 Status 7/21/22: Pt is reporting RUQ abdomen pain as 8/10 at rest, and does not change with activity. Bed mobility: Pt was SBA and used personal SPC to center herself once EOB. Increased time, effortful. Transfers: SBA from EOB to RW. From commode to RW was minAx1, with increased pain. Gait: Pt amb 10' from EOB to commode, then 20' to [facility] chair with RW. Increased time with flexed trunk due to abdomen pain. Pt stated she felt more stable using the RW over a SPC. Pt is having sx today, 7/21/22, for port placement. Due to current fatigue, weakness and safety needs, reccomend pt DC to home with 24/7 assistance and home therapy services to increase functional mobility. Will continue to monitor adn assess while here. Pt would benefit from a Rolling Walker upon DC to home to increase safety. Therapy Prognosis: Good;Fair  Decision Making: Medium Complexity  History: as noted. Clinical Presentation: evolving  Requires PT Follow-Up: Yes  Activity Tolerance  Activity Tolerance: Patient tolerated evaluation without incident;Patient limited by fatigue;Patient limited by pain; Patient limited by endurance     Plan   Plan  Plan: 3-5 times per week  Current Treatment Recommendations: Strengthening, Functional mobility training, Transfer training, Endurance training, Gait training, Stair training, Neuromuscular re-education, Safety education & training, Patient/Caregiver education & training, Equipment evaluation, education, & procurement, Therapeutic activities  Safety Devices  Type of Devices: All fall risk precautions in place, Patient at risk for falls, Call light within reach, Left in chair, Chair alarm in place     Restrictions  Restrictions/Precautions  Restrictions/Precautions: Fall Risk  Position Activity Restriction  Other position/activity restrictions: RUQ abdominal pain restricts pts movement; heprin drip     Subjective   General  Patient assessed for rehabilitation services?: Yes  Additional Pertinent Hx: Per H and P:  \"72 y.o. female with recent diagnosis of metastatic non small cell lung cancer otherwise no PMHx presented to [facility] with right upper quadrant abdominal pain. Sent in by her PCP. Active issues: acute pulmonary embolism with thromboembolism of IVC (heparin started), and Non-small cell lung cancer with mets. Oncology, general surgery, and cardiology consulted. Response To Previous Treatment: Not applicable  Family / Caregiver Present: No  Referring Practitioner: Andrew Mckeon MD  Referral Date : 07/20/22  Diagnosis: Acute pulmonary embolism without acute cor pulmonale, unspecified pulmonary embolism type ([facility] Metastatic non-small cell lung cancer ([facility] [address]  Follows Commands: Within Functional Limits  Subjective  Subjective: Pt in bed and agreeable to eval and therapy. Reported high pain in RUQ.      Social/Functional History  Social/Functional History  Lives With: Spouse, Daughter  Type of Home: House  Home Layout: Two level, Bed/Bath upstairs, Able to Live on Main level with bedroom/bathroom (pt has been sleeping on 1st floor)  Home Access: Stairs to enter with rails  Entrance Stairs - Number of Steps: 4 MARCI  Entrance Stairs - Rails: Right  Bathroom Shower/Tub: Tub/Shower unit  Bathroom Toilet: Standard  Home Equipment: [address] [facility]  ADL Assistance: Independent  Homemaking Assistance: Independent  Ambulation Assistance: Independent (most recently using cane)  Transfer Assistance: Independent  Active : Yes  Occupation: Full time employment  Type of Occupation: RN from [employer]: Impaired  Vision Exceptions: Wears glasses for reading  Hearing  Hearing: Within functional limits      Cognition   Orientation  Overall Orientation Status: Within Functional Limits  Orientation Level: Oriented X4  Cognition  Overall Cognitive Status: WFL  Cognition Comment: Seemed focused on L LE swelling and pain. Did not mention other findings in lungs. Objective   Observation/Palpation  Posture: Fair  Body Mechanics: flexed trunk to avoid tightness and pain in abdomen in standing  Gross Assessment  AROM: Generally decreased, functional  Strength: Generally decreased, functional  Tone: Normal  Sensation: Intact   AROM RLE (degrees)  RLE AROM: WFL  RLE General AROM: Limited due to pain, swelling and fatigue  AROM LLE (degrees)  LLE General AROM: Limited due to pain and fatigue  Strength RLE  Strength RLE: WFL  Strength LLE  Strength LLE: WFL  Balance  Sitting: Intact  Standing: With support (with RW)  Transfer Training  Transfer Training: Yes  Overall Level of Assistance: Contact-guard assistance;Assist X1;Additional time (up to RW)  Sit to Stand: Contact-guard assistance;Assist X1;Additional time  Stand to Sit: Contact-guard assistance;Assist X1;Additional time  Bed to Chair: Assist X1;Contact-guard assistance; Additional time  Toilet Transfer: Minimum assistance;Assist X1;Additional time (plus grab; pt would benefit from a raised toilet seat  in order to increase ind)  Gait  Overall Level of Assistance: Assist X1;Additional time;Contact-guard assistance (RW to and from bathroom; short distance in room)  Distance (ft):  (to and from bathroom)  Assistive Device: Walker, rolling  Bed mobility  Supine to Sit: Stand by assistance (used SPC to center self at EOB)  Sit to Supine: Unable to assess (in BS chair at end of session.)  Bed Mobility Comments: increased time, effortful and painful  Transfers  Sit to Stand: Stand by assistance;Contact guard assistance (to RW)  Stand to sit: Minimal Assistance;Stand by assistance;Contact guard assistance (with RW)  Comment: Pt required [place] to RW from commode to standing. Ambulation  Surface: level tile  Device: Rolling Walker  Assistance: Stand by assistance;Contact guard assistance  Quality of Gait: Increased time, pain did not increase but did not change with movement  Gait Deviations: Slow [place];Decreased step length;Decreased step height  Distance: 10', 20'  Comments: Pt amb to commode and then to [facility] chair. Flexed trunk to avoid tightness and pain in abdomen. Stairs/Curb  Stairs?: No  Balance  Posture: Good  Sitting - Static: Good  Sitting - Dynamic: Good  Standing - Static: Fair;+  Standing - Dynamic: Fair;+  Comments: Forward posture     AM-PAC Score  AM-PAC Inpatient Mobility Raw Score : 17 (07/21/22 1304)  AM-PAC Inpatient T-Scale Score : 42.13 (07/21/22 1304)  Mobility Inpatient CMS 0-100% Score: 50.57 (07/21/22 1304)  Mobility Inpatient CMS G-Code Modifier : CK (07/21/22 1304)     Goals  Short Term Goals  Time Frame for Short term goals: By acute discharge  Short term goal 1: Transfers with RW, SBA, no cues. Short term goal 2: Ambulate 48' with RW, no cues. Short term goal 3: Stair assessment as in home.      Education  Patient Education  Education Given To: Patient  Education Provided: Role of Therapy;Plan of Care;Transfer Training;Equipment  Education Method: Verbal  Education Outcome: Verbalized understanding    Therapy Time   Individual Concurrent Group Co-treatment   Time In 0935         Time Out 1020         Minutes 45         Timed Code Treatment Minutes: 37 Minutes   Merlin Hagen TA 17, [address] [facility]   Electronically signed by Jennifer Ceron on 7/21/2022 at 1:07 PM  I attest that I was present for and made a skilled & mindful clinical judgement during the evaluation and/or treatment of this patient on 7/21/2022

## 2022-07-21 NOTE — PROGRESS NOTES
Occupational Therapy  Facility/Department: [address] ORTHOPEDICS  Occupational Therapy Initial Assessment/Treatment     Name: Lora Velez  : 1955  MRN: [id number]  Date of Service: 2022    Discharge Recommendations:  24 hour supervision or assist, S Level 3, Home with Home health OT  OT Equipment Recommendations  Equipment Needed: Yes  Mobility Devices: Rogelio Highman; ADL Assistive Devices  Walker: Rolling  ADL Assistive Devices: Toileting - 3-in-1 Commode;Transfer Tub Bench; Toileting - Raised Toilet Seat with arms; Reacher     Lora Velez scored a 16/24 on the AM-PAC ADL Inpatient form. Current research shows that an AM-PAC score of 18 or greater is typically associated with a discharge to the patient's home setting. Based on the patient's AM-PAC score, and their current ADL deficits, it is recommended that the patient have 2-3 sessions per week of Occupational Therapy at d/c to increase the patient's independence. At this time, this patient demonstrates the endurance and safety to discharge home with  (home  services) and a follow up treatment frequency of 2-3x/wk. Please see assessment section for further patient specific details. If patient discharges prior to next session this note will serve as a discharge summary. Please see below for the latest assessment towards goals.    HOME HEALTH CARE: LEVEL 3 SAFETY     - Initial home health evaluation to occur within 24-48 hours, in patient home   - Therapy evaluations in home within 24-48 hours of discharge; including DME and home safety   - Frontload therapy 5 days, then 3x a week   - Therapy to evaluate if patient has [address] needs for personal care   -  evaluation within 24-48 hours, includes evaluation of resources and insurance to determine AL, [place], LTC, and Medicaid options         Patient Diagnosis(es): The primary encounter diagnosis was Acute pulmonary embolism without acute cor pulmonale, unspecified pulmonary embolism type ([facility] [address] Diagnoses of Acute thromboembolism of inferior vena cava (HCC) and Non-small cell lung cancer with metastasis ([facility] [address] were also pertinent to this visit. Past Medical History:  has a past medical history of Headache. Past Surgical History:  has a past surgical history that includes Appendectomy and Tonsillectomy. Assessment   Performance deficits / Impairments: Decreased functional mobility ; Decreased endurance;Decreased ADL status; Decreased balance;Decreased posture;Decreased strength;Decreased high-level IADLs  Assessment: Pt is a 73yr old female admitted fo rmetastatic adenocarcinoma likely from a left upper lobe lung primary with metastatic involvement of liver, soft tissue of the right lower leg, and numerous lymph node sites. Pt reports baseline independence with ADLs and working full-time as RN. Pt currently functioning below baseline due to deficits listed above. Pt required grossly CGA/MIN for mobility and transfers. Anticipate up to mod A for LB ADLs due to pain. Pt is hopefully to d/c home with support from family. Would recommend ongoing OT in order to increase functional status prior to returning home. Prognosis: Fair  Decision Making: Medium Complexity  REQUIRES OT FOLLOW-UP: Yes  Activity Tolerance  Activity Tolerance: Patient limited by pain; Patient limited by fatigue        Plan   Plan  Times per Week: 3-5x/wk  Current Treatment Recommendations: Strengthening, Balance training, Self-Care / ADL, Functional mobility training, Safety education & training, Endurance training, Patient/Caregiver education & training, Coordination training     Restrictions  Restrictions/Precautions  Restrictions/Precautions: Fall Risk  Position Activity Restriction  Other position/activity restrictions: RUQ abdominal pain restricts pts movement; heprin drip    Subjective   General  Chart Reviewed: Yes, Orders, Progress Notes, History and Physical, Labs, Imaging  Patient assessed for rehabilitation services?: Yes  Additional Pertinent Hx: Diana Ellis Is a 75-year-old woman with what appears to be metastatic adenocarcinoma likely from a left upper lobe lung primary with metastatic involvement of liver, soft tissue of the right lower leg, and numerous lymph node sites. Family / Caregiver Present: No  Referring Practitioner: Ericka Galarza MD  Diagnosis: RUE quadrant pain;  Subjective  Subjective: Pt in bed, agreeable to OT evaluation. Pt reporting pain in RUQ (8/10) - declined any intervention at this time  General Comment  Comments: RN julissa for therapy     Social/Functional History  Social/Functional History  Lives With: Spouse, Daughter  Type of Home: House  Home Layout: Two level, Bed/Bath upstairs, Able to Live on Main level with bedroom/bathroom (pt has been sleeping on 1st floor)  Home Access: Stairs to enter with rails  Entrance Stairs - Number of Steps: 4 MARCI  Entrance Stairs - Rails: Right  Bathroom Shower/Tub: Tub/Shower unit  Bathroom Toilet: Standard  Home Equipment: [address]  ADL Assistance: Independent  Homemaking Assistance: Independent  Ambulation Assistance: Independent (most recently using cane)  Transfer Assistance: Independent  Active : Yes  Occupation: Full time employment  Type of Occupation: RN from [employer]       Objective   Heart Rate: [address]: Monitor  BP: 98/60  BP Location: Left Arm  MAP (Calculated): 72.67  Resp: 16  SpO2: 93 %  O2 Device: None (Room air)          Observation/Palpation  Posture: Fair  Body Mechanics: flexed trunk to avoid tightness and pain in abdomen in standing; rounded shoulders; RLE edema  Safety Devices  Type of Devices: All fall risk precautions in place; Patient at risk for falls;Call light within reach; Left in chair;Chair alarm in place  Balance  Sitting: Intact  Standing: With support (with RW)  Transfer Training  Transfer Training: Yes  Overall Level of Assistance: Contact-guard assistance;Assist X1;Additional time (up to RW)  Sit to Stand: Contact-guard assistance;Assist X1;Additional time  Stand to Sit: Contact-guard assistance;Assist X1;Additional time  Bed to Chair: Assist X1;Contact-guard assistance; Additional time  Toilet Transfer: Minimum assistance;Assist X1;Additional time (plus grab; pt would benefit from a raised toilet seat  in order to increase ind)  Gait  Overall Level of Assistance: Assist X1;Additional time;Contact-guard assistance (RW to and from bathroom; short distance in room)  Distance (ft):  (to and from bathroom)  Assistive Device: Walker, rolling     AROM: Within functional limits  Strength: Generally decreased, functional  Coordination: Within functional limits  Tone: Normal  Sensation: Intact  ADL  Feeding Skilled Clinical Factors: NPO for surgery  Additional Comments: Declined the need for additional ADLs; Anticipate up to mod A for LB ADLs due to deficits in pain, strength ROM, and mobility     Activity Tolerance  Activity Tolerance: Patient tolerated evaluation without incident;Patient limited by fatigue;Patient limited by pain; Patient limited by endurance  Bed mobility  Supine to Sit: Stand by assistance (increased time and side rails; slow movement due to pain)  Sit to Supine: Unable to assess (pt up in chair at end of session)  Bed Mobility Comments: increased time, effortful and painful     Vision  Vision: Impaired  Vision Exceptions: Wears glasses for reading  Hearing  Hearing: Within functional limits  Cognition  Overall Cognitive Status: Exceptions  Arousal/Alertness: Appropriate responses to stimuli  Following Commands: Follows one step commands with increased time  Safety Judgement: Decreased awareness of need for assistance  Insights: Decreased awareness of deficits  Initiation: Requires cues for some  Cognition Comment: Seemed focused on L LE swelling and pain. Did not mention other findings in lungs.   Orientation  Overall Orientation Status: Within Functional Limits  Orientation Level: Oriented X4 Education Given To: Patient  Education Provided: Role of Therapy;Plan of Care;Precautions; ADL Adaptive Strategies;Transfer Training;Equipment  Education Provided Comments: DME needs, home therapy  Education Method: Demonstration  Education Outcome: Verbalized understanding;Continued education needed                AM-PAC Score        AM-PAC Inpatient Daily Activity Raw Score: 16 (07/21/22 1046)  AM-PAC Inpatient ADL T-Scale Score : 35.96 (07/21/22 1046)  ADL Inpatient CMS 0-100% Score: 53.32 (07/21/22 1046)  ADL Inpatient CMS G-Code Modifier : CK (07/21/22 1046)        Goals  Short Term Goals  Time Frame for Short term goals: By d/c  Short Term Goal 1: Pt will complete LB dressing with min A using AE as needed  Short Term Goal 2: Pt will complete toileting with supervision  Short Term Goal 3: Pt will complete toilet transfers with SBA  Short Term Goal 4: Pt will increase standing tolerance to 5mins to complete sink level ADLs       Therapy Time   Individual Concurrent Group Co-treatment   Time In 0935         Time Out 1020         Minutes 45         Timed Code Treatment Minutes: [facility], OTR/L

## 2022-07-21 NOTE — PROGRESS NOTES
Hospitalist Progress Note      PCP: Jocy Sears MD    Date of Admission: 7/19/2022    Chief Complaint: RUQ abdominal pain    Hospital Course: 74 y.o. female with recent diagnosis of metastatic non small cell lung cancer otherwise no PMHx presented to [facility] [address] with right upper quadrant abdominal pain. Sent in by her PCP. Patient reports pain for the last week. Constant and worsening. No nausea or vomiting. Last bowel movement 6 days ago. Patient had localized area of swelling initially in her right lower leg back in February. She had work-up done by vascular surgery which was unremarkable for vascular findings so  she was referred to orthopedic oncologist and pathology revealed small cell carcinoma. She has been referred to hematology oncology and is awaiting consultation. CT chest/abdomen/pelvis with contrast in the ED revealed large left upper lobe mass measuring 6.0 x 4.2 cm consistent with known malignancy. There is a large filling defect in the right interlobar pulmonary artery consistent with acute pulmonary embolism. There may be smaller filling defects in the branch vessels. No definite right heart strain. There is a 1.0 x 0.8 cm nodule in the right lower lobe could also be malignant. Additional scattered tiny nodules. There is diffuse hepatic metastatic disease with tumor thrombus extending into the intrahepatic IVC and common iliac veins with possible extension into the external iliac veins but no extension into the renal veins. There is prominent retrocrural/right upper quadrant and retroperitoneal lymph nodes also suspicious for metastatic disease. ED started the patient on heparin for PE and IVC clot. Heme-onc was consulted and results were discussed in detail by ED provider. Subjective: Pt seen and examined. She is feeling somewhat better, but she has persistent RUQ pain.        Medications:  Reviewed    Infusion Medications    [Held by provider] heparin (PORCINE) Infusion 1,610 Units/hr (07/21/22 0452)    sodium chloride       Scheduled Medications    folic acid  1 mg Oral Daily    cyanocobalamin  1,000 mcg SubCUTAneous Once    [START ON 7/22/2022] CARBOplatin (PARAPLATIN) chemo IVPB (by mg/m2)  200 mg/m2 IntraVENous Once    [START ON 7/22/2022] PEMEtrexed (ALIMTA) chemo infusion  900 mg IntraVENous Once    [START ON 7/22/2022] ondansetron with dexamethasone IVPB  16 mg IntraVENous Once    nicotine  1 patch TransDERmal Q24H    melatonin  10.5 mg Oral Nightly    sodium chloride flush  5-40 mL IntraVENous 2 times per day     PRN Meds: sodium chloride flush, sodium chloride, ondansetron **OR** ondansetron, polyethylene glycol, acetaminophen **OR** acetaminophen, perflutren lipid microspheres, morphine **OR** morphine, oxyCODONE-acetaminophen **OR** oxyCODONE-acetaminophen      Intake/Output Summary (Last 24 hours) at 7/21/2022 1037  Last data filed at 7/21/2022 0930  Gross per 24 hour   Intake 130 ml   Output --   Net 130 ml         Exam:    BP 98/60   Pulse 77   Temp 97.9 °F (36.6 °C) (Oral)   Resp 16   Ht 5' 7\" (1.702 m)   Wt 152 lb 1.9 oz (69 kg)   SpO2 93%   BMI 23.82 kg/m²     General appearance: No apparent distress, appears stated age and cooperative. HEENT: Pupils equal, round, and reactive to light. Conjunctivae/corneas clear. Neck: Supple, with full range of motion. No jugular venous distention. Trachea midline. Respiratory:  Normal respiratory effort. Clear to auscultation, bilaterally without Rales/Wheezes/Rhonchi. Cardiovascular: Regular rate and rhythm with normal S1/S2 without murmurs, rubs or gallops. Abdomen: Soft, TTP to RUQ, non-distended with normal bowel sounds. Musculoskeletal: TTP to RLE with erythema and warmth. No clubbing, cyanosis or edema bilaterally. Full range of motion without deformity. Skin: Skin color, texture, turgor normal.  No rashes or lesions.   Neurologic:  Neurovascularly intact without any focal sensory/motor deficits. Cranial nerves: II-XII intact, grossly non-focal.  Psychiatric: Alert and oriented, thought content appropriate, normal insight  Capillary Refill: Brisk,< 3 seconds   Peripheral Pulses: +2 palpable, equal bilaterally       Labs:   Recent Labs     07/19/22  1438 07/20/22  0633 07/21/22  0420   WBC 10.3 8.3 7.6   HGB 13.2 11.1* 11.8*   HCT 40.1 33.3* 35.2*    349 381       Recent Labs     07/19/22  1438 07/20/22  0633 07/21/22  0420   * 125* 129*   K 5.2* 3.9 5.2*   CL 83* 86* 88*   CO2 28 27 30   BUN 14 15 16   CREATININE 0.6 0.6 0.6   CALCIUM 9.8 8.6 9.4       Recent Labs     07/19/22  1438 07/20/22  0633 07/21/22  0420   * 147* 227*   ALT 38 33 52*   BILITOT 1.1* 0.8 0.7   ALKPHOS 890* 757* 790*       Recent Labs     07/19/22  1438   INR 1.09       No results for input(s): CKTOTAL, TROPONINI in the last 72 hours. Urinalysis:      Lab Results   Component Value Date/Time    NITRU POSITIVE 11/07/2019 04:45 PM    WBCUA  11/07/2019 04:45 PM    BACTERIA Rare 11/07/2019 04:45 PM    RBCUA 20-50 11/07/2019 04:45 PM    BLOODU LARGE 11/07/2019 04:45 PM    SPECGRAV 1.020 11/07/2019 04:45 PM    GLUCOSEU 100 11/07/2019 04:45 PM       Radiology:  MRI BRAIN W WO CONTRAST   Final Result   No brain metastases identified. Possible right maxillary sinus mucocele. CT CHEST ABDOMEN PELVIS W CONTRAST   Final Result   1. Large left upper lobe mass abutting the fissure and extending to the hilum   measuring 6.0 x 4.2 cm is consistent with known malignancy. 2. Large filling defect in the right interlobar pulmonary artery consistent   with acute pulmonary embolism and there may be smaller filling defects in   branch vessels. No definite right heart strain. 3. Moderate emphysema. A nodule in the medial right lower lobe measuring 1.0   x 0.8 cm could also be malignant and there are additional scattered tiny   nodules. 4. Diffuse hepatic metastatic disease.    5. Tumor thrombus extends into the intrahepatic IVC and common iliac veins   with possible extension into the external iliac veins but no definite   extension into the renal veins. 6. Prominent retrocrural, right upper quadrant, and retroperitoneal lymph   nodes also suspicious for metastatic disease. Findings were given to JAMEY Clancy in the ED on 7/19/2022 at 5:38 p.m. Assessment/Plan:    Active Hospital Problems    Diagnosis Date Noted    Acute pulmonary embolism without acute cor pulmonale, unspecified pulmonary embolism type ([address] [I26.99] 07/19/2022     Priority: Medium    Metastatic non-small cell lung cancer ([address] [C34.90] 07/13/2022     Priority: Medium       Acute Pulmonary Embolism with thromboembolism of IVC  - CT chest: Large left upper lobe mass abutting the fissure and extending to the hilum measuring 6.0 x 4.2 cm consistent with known malignancy. There is a large filling defect in the right interlobar pulmonary artery consistent with acute pulmonary embolism with possibility of smaller filling defects in the branch vessels. No definitive heart strain. There is tumor thrombus extending into the intrahepatic IVC and common iliac veins  - Continue heparin gtt, switch to Eliquis once procedures are completed  - Echo - no evidence of right heart strain  - bilateral lower ext doppler - no evidence of DVT  - cardiology consulted - no utility in thrombectomy after reviewing films     Metastatic non-small cell lung cancer   - newly dx, awaiting consult with oncology  - CT showing multiple lung nodules, diffuse hepatic metastatic disease with tumor thrombus extending into intrahepatic IVC and common iliac veins with possible extension into external iliac veins - no definite extension into renal veins.   Prominent retrocrural, right upper quadrant and retroperitoneal lymph nodes suspicious for metastatic disease  - on heparin gtt, switch to Eliquis once procedures are completed  - [facility] consulted in ED - planning on starting chemotherapy tomorrow inpatient  - radiation oncology following  - MRI Brain with and without contrast did not show any evidence of metastatic disease  - general surgery consulted for port placement, to be placed today    Hyponatremia - improved  - check urine sodium, osm; serum osm    Tobacco abuse  - smoking cessation counseling provided  - nicotine patches offered    DVT Prophylaxis: heparin gtt  Diet: Diet NPO  Code Status: Full Code    PT/OT Eval Status: defer    Dispo - continue care    Elva Cranker, MD

## 2022-07-22 LAB
A/G RATIO: 0.9 (ref 1.1–2.2)
ALBUMIN SERPL-MCNC: 3.2 G/DL (ref 3.4–5)
ALP BLD-CCNC: 836 U/L (ref 40–129)
ALT SERPL-CCNC: 55 U/L (ref 10–40)
ANION GAP SERPL CALCULATED.3IONS-SCNC: 14 MMOL/L (ref 3–16)
APTT: 106.2 SEC (ref 23–34.3)
APTT: 85.3 SEC (ref 23–34.3)
AST SERPL-CCNC: 259 U/L (ref 15–37)
BASOPHILS ABSOLUTE: 0 K/UL (ref 0–0.2)
BASOPHILS RELATIVE PERCENT: 0.6 %
BILIRUB SERPL-MCNC: 0.9 MG/DL (ref 0–1)
BUN BLDV-MCNC: 15 MG/DL (ref 7–20)
CALCIUM SERPL-MCNC: 9.3 MG/DL (ref 8.3–10.6)
CHLORIDE BLD-SCNC: 88 MMOL/L (ref 99–110)
CO2: 26 MMOL/L (ref 21–32)
CREAT SERPL-MCNC: <0.5 MG/DL (ref 0.6–1.2)
EOSINOPHILS ABSOLUTE: 0.1 K/UL (ref 0–0.6)
EOSINOPHILS RELATIVE PERCENT: 1.6 %
GFR AFRICAN AMERICAN: >60
GFR NON-AFRICAN AMERICAN: >60
GLUCOSE BLD-MCNC: 93 MG/DL (ref 70–99)
HCT VFR BLD CALC: 36.7 % (ref 36–48)
HEMOGLOBIN: 12.2 G/DL (ref 12–16)
LYMPHOCYTES ABSOLUTE: 2.4 K/UL (ref 1–5.1)
LYMPHOCYTES RELATIVE PERCENT: 28.7 %
MCH RBC QN AUTO: 29.3 PG (ref 26–34)
MCHC RBC AUTO-ENTMCNC: 33.3 G/DL (ref 31–36)
MCV RBC AUTO: 87.8 FL (ref 80–100)
MONOCYTES ABSOLUTE: 0.9 K/UL (ref 0–1.3)
MONOCYTES RELATIVE PERCENT: 10.1 %
NEUTROPHILS ABSOLUTE: 5 K/UL (ref 1.7–7.7)
NEUTROPHILS RELATIVE PERCENT: 59 %
OSMOLALITY URINE: 247 MOSM/KG (ref 390–1070)
OSMOLALITY: 280 MOSM/KG (ref 280–301)
PDW BLD-RTO: 16.3 % (ref 12.4–15.4)
PLATELET # BLD: 413 K/UL (ref 135–450)
PMV BLD AUTO: 8 FL (ref 5–10.5)
POTASSIUM SERPL-SCNC: 4 MMOL/L (ref 3.5–5.1)
RBC # BLD: 4.18 M/UL (ref 4–5.2)
SODIUM BLD-SCNC: 128 MMOL/L (ref 136–145)
SODIUM URINE: <20 MMOL/L
TOTAL PROTEIN: 6.7 G/DL (ref 6.4–8.2)
WBC # BLD: 8.5 K/UL (ref 4–11)

## 2022-07-22 PROCEDURE — 97530 THERAPEUTIC ACTIVITIES: CPT

## 2022-07-22 PROCEDURE — 85025 COMPLETE CBC W/AUTO DIFF WBC: CPT

## 2022-07-22 PROCEDURE — 2580000003 HC RX 258: Performed by: SURGERY

## 2022-07-22 PROCEDURE — 6370000000 HC RX 637 (ALT 250 FOR IP): Performed by: SURGERY

## 2022-07-22 PROCEDURE — 84300 ASSAY OF URINE SODIUM: CPT

## 2022-07-22 PROCEDURE — 97116 GAIT TRAINING THERAPY: CPT

## 2022-07-22 PROCEDURE — 6370000000 HC RX 637 (ALT 250 FOR IP): Performed by: INTERNAL MEDICINE

## 2022-07-22 PROCEDURE — 36415 COLL VENOUS BLD VENIPUNCTURE: CPT

## 2022-07-22 PROCEDURE — 85730 THROMBOPLASTIN TIME PARTIAL: CPT

## 2022-07-22 PROCEDURE — 2580000003 HC RX 258: Performed by: INTERNAL MEDICINE

## 2022-07-22 PROCEDURE — 80053 COMPREHEN METABOLIC PANEL: CPT

## 2022-07-22 PROCEDURE — 83935 ASSAY OF URINE OSMOLALITY: CPT

## 2022-07-22 PROCEDURE — 1200000000 HC SEMI PRIVATE

## 2022-07-22 PROCEDURE — 94760 N-INVAS EAR/PLS OXIMETRY 1: CPT

## 2022-07-22 PROCEDURE — 6360000002 HC RX W HCPCS: Performed by: SURGERY

## 2022-07-22 RX ORDER — SODIUM CHLORIDE 9 MG/ML
INJECTION, SOLUTION INTRAVENOUS CONTINUOUS
Status: ACTIVE | OUTPATIENT
Start: 2022-07-22 | End: 2022-07-23

## 2022-07-22 RX ADMIN — APIXABAN 10 MG: 5 TABLET, FILM COATED ORAL at 10:50

## 2022-07-22 RX ADMIN — SODIUM CHLORIDE, PRESERVATIVE FREE 10 ML: 5 INJECTION INTRAVENOUS at 21:04

## 2022-07-22 RX ADMIN — CARBOPLATIN 362 MG: 10 INJECTION, SOLUTION INTRAVENOUS at 15:31

## 2022-07-22 RX ADMIN — SODIUM CHLORIDE, PRESERVATIVE FREE 10 ML: 5 INJECTION INTRAVENOUS at 08:50

## 2022-07-22 RX ADMIN — OXYCODONE AND ACETAMINOPHEN 2 TABLET: 5; 325 TABLET ORAL at 08:52

## 2022-07-22 RX ADMIN — Medication 10.5 MG: at 23:10

## 2022-07-22 RX ADMIN — DEXAMETHASONE SODIUM PHOSPHATE 16 MG: 4 INJECTION, SOLUTION INTRA-ARTICULAR; INTRALESIONAL; INTRAMUSCULAR; INTRAVENOUS; SOFT TISSUE at 14:52

## 2022-07-22 RX ADMIN — FOLIC ACID 1 MG: 1 TABLET ORAL at 08:50

## 2022-07-22 RX ADMIN — OXYCODONE AND ACETAMINOPHEN 2 TABLET: 5; 325 TABLET ORAL at 13:06

## 2022-07-22 RX ADMIN — SODIUM CHLORIDE 900 MG: 9 INJECTION, SOLUTION INTRAVENOUS at 16:15

## 2022-07-22 RX ADMIN — SODIUM CHLORIDE: 9 INJECTION, SOLUTION INTRAVENOUS at 13:12

## 2022-07-22 RX ADMIN — OXYCODONE AND ACETAMINOPHEN 2 TABLET: 5; 325 TABLET ORAL at 23:10

## 2022-07-22 RX ADMIN — OXYCODONE AND ACETAMINOPHEN 2 TABLET: 5; 325 TABLET ORAL at 02:02

## 2022-07-22 RX ADMIN — OXYCODONE AND ACETAMINOPHEN 2 TABLET: 5; 325 TABLET ORAL at 18:52

## 2022-07-22 RX ADMIN — APIXABAN 10 MG: 5 TABLET, FILM COATED ORAL at 21:04

## 2022-07-22 ASSESSMENT — PAIN DESCRIPTION - ORIENTATION
ORIENTATION: RIGHT;MID
ORIENTATION: RIGHT

## 2022-07-22 ASSESSMENT — PAIN DESCRIPTION - FREQUENCY
FREQUENCY: CONTINUOUS

## 2022-07-22 ASSESSMENT — PAIN SCALES - GENERAL
PAINLEVEL_OUTOF10: 8
PAINLEVEL_OUTOF10: 8
PAINLEVEL_OUTOF10: 0
PAINLEVEL_OUTOF10: 8
PAINLEVEL_OUTOF10: 0
PAINLEVEL_OUTOF10: 7
PAINLEVEL_OUTOF10: 8

## 2022-07-22 ASSESSMENT — PAIN DESCRIPTION - DESCRIPTORS
DESCRIPTORS: CRAMPING
DESCRIPTORS: CRAMPING
DESCRIPTORS: ACHING;CRAMPING
DESCRIPTORS: CRAMPING;PRESSURE

## 2022-07-22 ASSESSMENT — PAIN DESCRIPTION - PAIN TYPE
TYPE: ACUTE PAIN

## 2022-07-22 ASSESSMENT — PAIN DESCRIPTION - ONSET
ONSET: ON-GOING

## 2022-07-22 ASSESSMENT — PAIN DESCRIPTION - LOCATION
LOCATION: FLANK
LOCATION: FLANK
LOCATION: ABDOMEN
LOCATION: FLANK

## 2022-07-22 ASSESSMENT — PAIN - FUNCTIONAL ASSESSMENT
PAIN_FUNCTIONAL_ASSESSMENT: PREVENTS OR INTERFERES SOME ACTIVE ACTIVITIES AND ADLS

## 2022-07-22 NOTE — PROGRESS NOTES
endurance; Increased pain  Assessment: Per H and P: [age] y.o. female with recent diagnosis of metastatic non small cell lung cancer otherwise no PMHx presented to [facility] with right upper quadrant abdominal pain. Sent in by her PCP. Active issues: acute pulmonary embolism with thromboembolism of IVC (heparin started), and Non-small cell lung cancer with mets. Oncology, general surgery, and cardiology consulted. P subclavian Port-A-Cath placed 7-21-22. Prior Status: Pt reports independence in home with ADLs, IADLs, bed mobility, transfers and gait. 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 Status 7/22/22: Pt is reporting high RUQ abdomen pain throughout session. Bed mobility: Min A sit to supine. Increased time, effortful. Transfers: CGA and cues for technique. Gait: Pt amb with RW x 20' with CGA. Overall Poor quality (but no loss of balance) and tolerance . Increased time with flexed trunk due to abdomen pain. Unable to tolerate further ambulation/stairs due to level of abdominal pain. Due to current fatigue, weakness and safety needs, reccomend pt DC to home with 24/7 assistance and home therapy services to increase functional mobility. Will continue to monitor and assess while here. Pt would benefit from a Rolling Walker upon DC to home to increase safety. Medical status and needs may drive DC plan. Therapy Prognosis: Good;Fair  History: as noted. Requires PT Follow-Up: Yes  Activity Tolerance  Activity Tolerance: Patient limited by pain  Activity Tolerance Comments: Patient had removed O2.  95% / 86 HR seated in BS chair. 95% /81 HR lightly sleeping in bed at end of session. O2 not placed.  RN informed     Plan   Plan  Plan: 3-5 times per week  Current Treatment Recommendations: Strengthening, Functional mobility training, Transfer training, Endurance training, Gait training, Stair training, Neuromuscular re-education, Safety education & training, Patient/Caregiver education & training, Equipment evaluation, education, & procurement, Therapeutic activities  Safety Devices  Type of Devices: All fall risk precautions in place, Patient at risk for falls, Call light within reach, Left in chair, Bed alarm in place, Nurse notified (TOAN Canas informed)     Restrictions  Restrictions/Precautions  Restrictions/Precautions: Fall Risk  Position Activity Restriction  Other position/activity restrictions: RUQ abdominal pain restricts pts movement     Subjective   General  Chart Reviewed: Yes  Additional Pertinent Hx: Per H and P:  \"72 y.o. female with recent diagnosis of metastatic non small cell lung cancer otherwise no PMHx presented to [facility] with right upper quadrant abdominal pain. Sent in by her PCP. Active issues: acute pulmonary embolism with thromboembolism of IVC (heparin started), and Non-small cell lung cancer with mets. Oncology, general surgery, and cardiology consulted. Underwent R subclavian Port-A Cath placement. Response To Previous Treatment: Not applicable  Family / Caregiver Present: No  Referring Practitioner: Ryan Uribe MD  Referral Date : 07/20/22  Diagnosis: Acute pulmonary embolism without acute cor pulmonale, unspecified pulmonary embolism type ([facility] [address] Metastatic non-small cell lung cancer ([facility] [address]  Follows Commands: Within Functional Limits  Subjective  Subjective: Pt in bedside chair. Appears distressed. Reporting high pain in RUQ, and requesting return to bed.          Social/Functional History  Social/Functional History  Lives With: Spouse, Daughter  Type of Home: House  Home Layout: Two level, Bed/Bath upstairs, Able to Live on Main level with bedroom/bathroom (pt has been sleeping on 1st floor)  Home Access: Stairs to enter with rails  Entrance Stairs - Number of Steps: 4 MARCI  Entrance Stairs - Rails: Right  Bathroom Shower/Tub: Tub/Shower unit  Bathroom Toilet: Standard  Home Equipment: Pito Fields  ADL Assistance: Independent  Homemaking Assistance: Independent  Ambulation Assistance: Independent (most recently using cane)  Transfer Assistance: Independent  Active : Yes  Occupation: Full time employment  Type of Occupation: RN from [employer]   Orientation  Overall Orientation Status: Within Functional Limits  Cognition  Overall Cognitive Status: Exceptions  Arousal/Alertness: Appropriate responses to stimuli  Following Commands: Follows one step commands with increased time  Safety Judgement: Decreased awareness of need for assistance  Insights: Decreased awareness of deficits  Initiation: Requires cues for some     Objective   Observation/Palpation  Body Mechanics: flexed trunk to avoid tightness and pain in abdomen in standing; rounded shoulders; BLE edema, R worse than L. Bed mobility  Supine to Sit: Unable to assess (in BS chair at start of session)  Sit to Supine: Minimal assistance (cues to log roll to lessen stress to abdominal area.)  Scooting: Stand by assistance  Transfers  Sit to Stand: Contact guard assistance (verbal/tactile cues for hand placement. Slow, effortful. Appears painful.)  Stand to sit: Contact guard assistance  Ambulation  Surface: level tile  Device: Rolling Walker  Assistance: Contact guard assistance  Quality of Gait: Increased time, Moderately flexed trunk. Step through pattern. Cues to remain within RW base, which patient does not follow (seems distracted / overwhelmed by pain)  Gait Deviations: Slow [place];Decreased step length;Decreased step height  Distance: 20'  Comments: Patient declines use of commode  Stairs/Curb  Stairs?: No  Balance  Posture: Fair  Sitting - Static: Good  Sitting - Dynamic: Good  Standing - Static: Fair;+ (at RW)  Standing - Dynamic: Fair;+ (@ RW)  Comments: Moderately flexed trunk, due to upper abdominal pain.      AM-PAC Score  AM-PAC Inpatient Mobility Raw Score : 17 (07/22/22 0907)  AM-PAC Inpatient T-Scale Score : 42.13 (07/22/22 0907)  Mobility Inpatient CMS 0-100% Score: 50.57 (07/22/22 0949)  Mobility Inpatient CMS G-Code Modifier : CK (07/22/22 3181)     Goals  Short Term Goals  Time Frame for Short term goals: By acute discharge. 7-22-22: all goals ongoing. Short term goal 1: Transfers with RW, SBA, no cues. Short term goal 2: Ambulate 48' with RW, no cues. Short term goal 3: Stair assessment as in home. Patient Goals   Patient goals : None stated, other than wish for pain relief.        Education  Patient Education  Education Given To: Patient  Education Provided: Role of Therapy;Plan of Care;Transfer Training;Equipment  Education Method: Verbal  Education Outcome: Verbalized understanding      Therapy Time   Individual Concurrent Group Co-treatment   Time In 1358         Time Out 2426         Minutes 25            Gt 10; TA ARELY Arnold  Electronically signed by Beryle Boast, [address] [place] [address] [phone])  on 7/22/2022 at 9:10 AM

## 2022-07-22 NOTE — PROGRESS NOTES
Call placed to general surgery in regards to accessing patient's new port. Ok to access and use.  Electronically signed by Bright Blood RN on 7/22/2022 at 10:46 AM

## 2022-07-22 NOTE — PROGRESS NOTES
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 Subjective: Patient seen and examined, main complaint is right upper quadrant pain. Pain could be related to tumor thrombus which extends into the intrahepatic IVC and common iliac veins. Symptomatic management of pain. Noted plans for initiation of chemotherapy today. Port placed on 7/21/2022 by general surgery team.      Medications:  Reviewed    Infusion Medications    sodium chloride       Scheduled Medications    apixaban  10 mg Oral BID    folic acid  1 mg Oral Daily    CARBOplatin (PARAPLATIN) chemo IVPB (by mg/m2)  200 mg/m2 IntraVENous Once    PEMEtrexed (ALIMTA) chemo infusion  900 mg IntraVENous Once    ondansetron with dexamethasone IVPB  16 mg IntraVENous Once    nicotine  1 patch TransDERmal Q24H    melatonin  10.5 mg Oral Nightly    sodium chloride flush  5-40 mL IntraVENous 2 times per day     PRN Meds: sodium chloride flush, sodium chloride, ondansetron **OR** ondansetron, polyethylene glycol, acetaminophen **OR** acetaminophen, perflutren lipid microspheres, morphine **OR** morphine, oxyCODONE-acetaminophen **OR** oxyCODONE-acetaminophen      Intake/Output Summary (Last 24 hours) at 7/22/2022 1246  Last data filed at 7/22/2022 8693  Gross per 24 hour   Intake 882.32 ml   Output 1450 ml   Net -567.68 ml       Physical Exam Performed:    BP (!) 99/51   Pulse 84   Temp 98.4 °F (36.9 °C) (Oral)   Resp 16   Ht 5' 7\" (1.702 m)   Wt 148 lb 13 oz (67.5 kg)   SpO2 92%   BMI 23.31 kg/m²     General appearance: No apparent distress, appears stated age and cooperative. HEENT: Pupils equal, round, and reactive to light. Conjunctivae/corneas clear.   Respiratory: Normal respiratory effort. Clear to auscultation, bilaterally without Rales/Wheezes/Rhonchi. Cardiovascular: Regular rate and rhythm with normal S1/S2   Abdomen: Soft, tender to palpation in right upper quadrant. Musculoskeletal: Bilateral leg edema +  Neurologic: grossly non-focal.  Psychiatric: Alert and oriented, thought content appropriate, normal insight      Labs:   Recent Labs     07/20/22  0633 07/21/22  0420 07/22/22  0719   WBC 8.3 7.6 8.5   HGB 11.1* 11.8* 12.2   HCT 33.3* 35.2* 36.7    381 413     Recent Labs     07/20/22  0633 07/21/22  0420 07/22/22  0719   * 129* 128*   K 3.9 5.2* 4.0   CL 86* 88* 88*   CO2 27 30 26   BUN 15 16 15   CREATININE 0.6 0.6 <0.5*   CALCIUM 8.6 9.4 9.3     Recent Labs     07/20/22  0633 07/21/22  0420 07/22/22  0719   * 227* 259*   ALT 33 52* 55*   BILITOT 0.8 0.7 0.9   ALKPHOS 757* 790* 836*     Recent Labs     07/19/22  1438   INR 1.09     No results for input(s): CKTOTAL, TROPONINI in the last 72 hours. Urinalysis:      Lab Results   Component Value Date/Time    NITRU POSITIVE 11/07/2019 04:45 PM    WBCUA  11/07/2019 04:45 PM    BACTERIA Rare 11/07/2019 04:45 PM    RBCUA 20-50 11/07/2019 04:45 PM    BLOODU LARGE 11/07/2019 04:45 PM    SPECGRAV 1.020 11/07/2019 04:45 PM    GLUCOSEU 100 11/07/2019 04:45 PM       Radiology:  XR CHEST PORTABLE   Final Result      1. Right-sided chest port has been placed via the right subclavian vein   since the prior study with the tip in the upper SVC. No evidence of   pneumothorax. 2.  Presumed mass within the left mid lung field is unchanged. 3.  Some opacity in left lower lung field is likely related to a suboptimal   inspiration and otherwise the lungs appear unchanged. FL VASCULAR ACCESS GUIDANCE   Final Result      MRI BRAIN W WO CONTRAST   Final Result   No brain metastases identified. Possible right maxillary sinus mucocele.          CT CHEST ABDOMEN PELVIS W CONTRAST   Final Result   1. Large left upper lobe mass abutting the fissure and extending to the hilum   measuring 6.0 x 4.2 cm is consistent with known malignancy. 2. Large filling defect in the right interlobar pulmonary artery consistent   with acute pulmonary embolism and there may be smaller filling defects in   branch vessels. No definite right heart strain. 3. Moderate emphysema. A nodule in the medial right lower lobe measuring 1.0   x 0.8 cm could also be malignant and there are additional scattered tiny   nodules. 4. Diffuse hepatic metastatic disease. 5. Tumor thrombus extends into the intrahepatic IVC and common iliac veins   with possible extension into the external iliac veins but no definite   extension into the renal veins. 6. Prominent retrocrural, right upper quadrant, and retroperitoneal lymph   nodes also suspicious for metastatic disease. Findings were given to JAMEY Fields in the ED on 7/19/2022 at 5:38 p.m. Assessment/Plan:    Active Hospital Problems    Diagnosis     Acute pulmonary embolism without acute cor pulmonale, unspecified pulmonary embolism type ([facility] [address] [I26.99]      Priority: Medium    Metastatic non-small cell lung cancer (HCC) [C34.90]      Priority: Medium     Metastatic non-small cell cancer: Oncology following patient, plans to initiate chemotherapy in-house and transition to immunotherapy postdischarge. 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 Symptomatic management of pain. Continue anticoagulation with Eliquis transitioned off heparin drip post placement of port  Hyponatremia: May be multifactorial in setting of lung mass possibly causing SI ADH versus dehydration. IV fluid hydration overnight, monitor BMP. Follow up on urine studies and adjust management as needed. Supportive care      DVT Prophylaxis: On Eliquis  Diet: ADULT DIET;  Regular  Code Status: Full Code        Dispo -possible discharge home in 1 to 2 days pending clearance from oncology team    Bladimir Herrera MD

## 2022-07-22 NOTE — PLAN OF CARE
Problem: Discharge Planning  Goal: Discharge to home or other facility with appropriate resources  Outcome: Progressing  Flowsheets (Taken 7/22/2022 0932)  Discharge to home or other facility with appropriate resources: Identify barriers to discharge with patient and caregiver     Problem: Pain  Goal: Verbalizes/displays adequate comfort level or baseline comfort level  Outcome: Progressing  Flowsheets (Taken 7/22/2022 0932)  Verbalizes/displays adequate comfort level or baseline comfort level:   Encourage patient to monitor pain and request assistance   Administer analgesics based on type and severity of pain and evaluate response   Assess pain using appropriate pain scale   Implement non-pharmacological measures as appropriate and evaluate response     Problem: Safety - Adult  Goal: Free from fall injury  7/22/2022 0932 by Dennis Barnes RN  Outcome: Progressing  Flowsheets (Taken 7/22/2022 0932)  Free From Fall Injury: Instruct family/caregiver on patient safety  7/22/2022 0313 by Ruslan Meneses RN  Flowsheets (Taken 7/22/2022 5719)  Free From Fall Injury: Instruct family/caregiver on patient safety  Note: Pt asked to call for assistance to ambulate to restroom. Bed locked lowest position, non skid socks in place, call light in reach,bed alarm engaged. Problem: ABCDS Injury Assessment  Goal: Absence of physical injury  Outcome: Progressing  Flowsheets (Taken 7/22/2022 0932)  Absence of Physical Injury: Implement safety measures based on patient assessment     Problem: Skin/Tissue Integrity  Goal: Absence of new skin breakdown  Description: 1. Monitor for areas of redness and/or skin breakdown  2. Assess vascular access sites hourly  3. Every 4-6 hours minimum:  Change oxygen saturation probe site  4. Every 4-6 hours:  If on nasal continuous positive airway pressure, respiratory therapy assess nares and determine need for appliance change or resting period.   Outcome: Progressing  Note: Pt shows no new signs of skin integrity. Will monitor and encourage patient to turn and reposition every two hours.

## 2022-07-22 NOTE — CARE COORDINATION
07/22/22 1417   IMM Letter   IMM Letter given to Patient/Family/Significant other/Guardian/POA/by: second letter given to patient  hs   IMM Letter date given: 07/22/22   IMM Letter time given: 65     Spoke with patient regarding therapy recommendation for home care--provided home care list--and rolling walker. Patient reported that she is not interested in home care at this time. She reported her spouse is home and is able to assist her as needed. Patient may be placed on Eliquis and will need Eliquis card or script [facility].     Electronically signed by ISAIAH Hameed, NEVAEH, Case Management on 7/22/2022 at 2:19 PM  [place] [phone]

## 2022-07-22 NOTE — OP NOTE
[address]                                OPERATIVE REPORT    PATIENT NAME: Brooke Santo                     :        1955  MED REC NO:   [id number]                          ROOM:       3120  ACCOUNT NO:   [de-identified]                           ADMIT DATE: 2022  PROVIDER:     Rodo Newman MD      DATE OF PROCEDURE:  2022    PREOPERATIVE DIAGNOSIS:  Metastatic non-small-cell lung cancer. POSTOPERATIVE DIAGNOSIS:  Metastatic non-small-cell lung cancer. PROCEDURE PERFORMED:  Right subclavian Port-A-Cath placement with  fluoroscopic visualization. SURGEON:  Rodo Newman MD    SPECIMEN:  None. ESTIMATED BLOOD LOSS:  Less than 50 mL. DISPOSITION:  To recovery in stable condition. INDICATION:  The patient is a 67-year-old female recently diagnosed with  metastatic non-small cell carcinoma of the lung. Port-A-Cath placement  has been requested to assist with chemotherapy and after discussing the  risks, benefits and alternatives, she has agreed to proceed. PROCEDURE:  The patient was brought to the operating room, placed  supine, sedation delivered, and the chest prepped and draped in a  sterile fashion. Local anesthetic was infused and the right subclavian  vein cannulated on the first attempt. A guidewire was passed without  resistance and positioned into the superior vena cava. A transverse  incision was made on the chest wall and carried down to the pectoralis  fascia. Blunt dissection was then used to create a subcutaneous pocket. A sheath and dilator were passed over the guidewire under fluoroscopy  and positioned into the superior vena cava. The dilator and guidewire  were now removed and a 7.5 Polish catheter positioned in the superior  vena cava and the sheath withdrawn.   The catheter aspirated and flushed  easily, so was divided and attached to the port which was positioned  into the subcutaneous pocket. This was also accessed and aspirated and  flushed easily. The port was then anchored to the pectoralis using  interrupted 3-0 Vicryl. The skin was closed with 3-0 and 4-0 Vicryl. A  final heparin infusion was then placed and dressings applied. The  patient was stable and transferred to recovery in good condition.         Shai Rendon MD    D: 07/21/2022 14:03:35       T: 07/21/2022 14:07:15     [email or web address]  Job#: [id number]     Doc#: [id number]    CC:

## 2022-07-22 NOTE — PLAN OF CARE
Problem: Safety - Adult  Goal: Free from fall injury  7/22/2022 0313 by Yasmin Akhtar RN  Flowsheets (Taken 7/22/2022 8286)  Free From Fall Injury: Instruct family/caregiver on patient safety  Note: Pt asked to call for assistance to ambulate to restroom. Bed locked lowest position, non skid socks in place, call light in reach,bed alarm engaged.   7/21/2022 1717 by Shayne Valadez RN  Flowsheets (Taken 7/21/2022 1717)  Free From Fall Injury: Instruct family/caregiver on patient safety

## 2022-07-22 NOTE — PROGRESS NOTES
Hematology Oncology Daily Progress Note    Admit Date: 7/19/2022  Hospital day several    Subjective:     Patient has complaints of stable to slightly improved abdominal pain--denies sob/cp. Medication side effects: none    Scheduled Meds:   folic acid  1 mg Oral Daily    CARBOplatin (PARAPLATIN) chemo IVPB (by mg/m2)  200 mg/m2 IntraVENous Once    PEMEtrexed (ALIMTA) chemo infusion  900 mg IntraVENous Once    ondansetron with dexamethasone IVPB  16 mg IntraVENous Once    nicotine  1 patch TransDERmal Q24H    melatonin  10.5 mg Oral Nightly    sodium chloride flush  5-40 mL IntraVENous 2 times per day     Continuous Infusions:   heparin (PORCINE) Infusion 1,540 Units/hr (07/22/22 0274)    sodium chloride       PRN Meds:sodium chloride flush, sodium chloride, ondansetron **OR** ondansetron, polyethylene glycol, acetaminophen **OR** acetaminophen, perflutren lipid microspheres, morphine **OR** morphine, oxyCODONE-acetaminophen **OR** oxyCODONE-acetaminophen    Review of Systems  Pertinent items are noted in HPI. REVIEW OF SYSTEMS:         Constitutional: Denies fever, sweats, weight loss     Eyes: No visual changes or diplopia. No scleral icterus. ENT: No Headaches, hearing loss or vertigo. No mouth sores or sore throat. Cardiovascular: No chest pain, dyspnea on exertion, palpitations or loss of consciousness. Respiratory: No cough or wheezing, no sputum production. No hemoptysis. .    Gastrointestinal: No abdominal pain, appetite loss, blood in stools. No change in bowel habits. Genitourinary: No dysuria, trouble voiding, or hematuria. Musculoskeletal:  Generalized weakness. No joint complaints. Integumentary: No rash or pruritis. Neurological: No headache, diplopia. No change in gait, balance, or coordination. No paresthesias. Endocrine: No temperature intolerance. No excessive thirst, fluid intake, or urination.    Hematologic/Lymphatic: No abnormal bruising or ecchymoses, blood clots or swollen lymph nodes. Allergic/Immunologic: No nasal congestion or hives. Objective:     Patient Vitals for the past 8 hrs:   BP Temp Temp src Pulse Resp SpO2 Weight   07/22/22 0907 -- -- -- -- -- 94 % --   07/22/22 0640 -- -- -- -- -- -- 148 lb 13 oz (67.5 kg)   07/22/22 0339 112/70 97.9 °F (36.6 °C) Oral 65 14 95 % --     I/O last 3 completed shifts: In: 772.3 [P.O.:240; I.V.:482.3; IV Piggyback:50]  Out: 1450 [Urine:1450]  No intake/output data recorded.     /70   Pulse 65   Temp 97.9 °F (36.6 °C) (Oral)   Resp 14   Ht 5' 7\" (1.702 m)   Wt 148 lb 13 oz (67.5 kg)   SpO2 94%   BMI 23.31 kg/m²     General Appearance:    Alert, cooperative, no distress, appears stated age   Head:    Normocephalic, without obvious abnormality, atraumatic   Eyes:    PERRL, conjunctiva/corneas clear, EOM's intact, fundi     benign, both eyes   Ears:    Normal TM's and external ear canals, both ears   Nose:   Nares normal, septum midline, mucosa normal, no drainage    or sinus tenderness   Throat:   Lips, mucosa, and tongue normal; teeth and gums normal   Neck:   Supple, symmetrical, trachea midline, no adenopathy;     thyroid:  no enlargement/tenderness/nodules; no carotid    bruit or JVD   Back:     Symmetric, no curvature, ROM normal, no CVA tenderness   Lungs:     Clear to auscultation bilaterally, respirations unlabored   Chest Wall:    No tenderness or deformity    Heart:    Regular rate and rhythm, S1 and S2 normal, no murmur, rub   or gallop       Abdomen:     Soft, non-tender, bowel sounds active all four quadrants,     no masses, no organomegaly           Extremities:   Extremities normal, atraumatic, no cyanosis or edema   Pulses:   2+ and symmetric all extremities   Skin:   Skin color, texture, turgor normal, no rashes or lesions   Lymph nodes:   Cervical, supraclavicular, and axillary nodes normal   Neurologic:   CNII-XII intact, normal strength, sensation and reflexes     throughout       Data ReviewCBC:   Lab Results Component Value Date/Time    WBC 8.5 07/22/2022 07:19 AM    RBC 4.18 07/22/2022 07:19 AM       Assessment:     Principal Problem:    Acute pulmonary embolism without acute cor pulmonale, unspecified pulmonary embolism type (HCC)  Active Problems:    Metastatic non-small cell lung cancer ([facility] [address]  Resolved Problems:    * No resolved hospital problems. *      Plan:     1. Stage IV lung adenocarcinoma. I will start Carboplatin plus Alimta today. I explained the potential side effects including nausea, vomiting, hearing loss, hair loss, renal failure, rashes, life-threatening infections, and even death. She did get B12 and folate acid supplementation. I will have Caris testing sent on her biopsy. I will add in [address] for cycle 2 as an outpatient. 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 Now that her port is in, I will stop the heparin and start Eliquis.         Electronically signed by Felicita Evans MD on 7/22/2022 at 9:10 AM

## 2022-07-22 NOTE — PROGRESS NOTES
Clinical Pharmacy Note  Heparin Dosing       Lab Results   Component Value Date/Time    APTT 106.2 07/22/2022 01:05 AM     Lab Results   Component Value Date/Time    HGB 11.8 07/21/2022 04:20 AM    HCT 35.2 07/21/2022 04:20 AM     07/21/2022 04:20 AM    INR 1.09 07/19/2022 02:38 PM       Current Infusion Rate: 1610 units/hr    Plan:  Rate: Reduce to 1540 units/hr  Next aPTT: 0800  7/22/22    Pharmacy will continue to monitor and adjust based on aPTT results.     Ness Yañez, [facility]  7/22/2022 1:51 AM

## 2022-07-22 NOTE — PROGRESS NOTES
Pt A&O in the bed. Pt tolerating PO intake well. AM medications administered without complications. Pt has complaints of pain on her right side. PRN medication administered. Will monitor. Dressing to right chest is clean, dry and intact. Ambulated with walker and SBA x1. Call light within reach. Able to make needs known. Fall precautions in place. Will monitor.  Electronically signed by Darnell Martinez RN on 7/22/2022 at 9:31 AM

## 2022-07-23 LAB
A/G RATIO: 1 (ref 1.1–2.2)
ALBUMIN SERPL-MCNC: 2.8 G/DL (ref 3.4–5)
ALP BLD-CCNC: 732 U/L (ref 40–129)
ALT SERPL-CCNC: 47 U/L (ref 10–40)
ANION GAP SERPL CALCULATED.3IONS-SCNC: 14 MMOL/L (ref 3–16)
AST SERPL-CCNC: 197 U/L (ref 15–37)
BASOPHILS ABSOLUTE: 0 K/UL (ref 0–0.2)
BASOPHILS RELATIVE PERCENT: 0.6 %
BILIRUB SERPL-MCNC: 0.8 MG/DL (ref 0–1)
BUN BLDV-MCNC: 14 MG/DL (ref 7–20)
CALCIUM SERPL-MCNC: 8.6 MG/DL (ref 8.3–10.6)
CHLORIDE BLD-SCNC: 91 MMOL/L (ref 99–110)
CO2: 25 MMOL/L (ref 21–32)
CREAT SERPL-MCNC: <0.5 MG/DL (ref 0.6–1.2)
EOSINOPHILS ABSOLUTE: 0 K/UL (ref 0–0.6)
EOSINOPHILS RELATIVE PERCENT: 0.1 %
GFR AFRICAN AMERICAN: >60
GFR NON-AFRICAN AMERICAN: >60
GLUCOSE BLD-MCNC: 134 MG/DL (ref 70–99)
HCT VFR BLD CALC: 33.3 % (ref 36–48)
HEMOGLOBIN: 11 G/DL (ref 12–16)
LYMPHOCYTES ABSOLUTE: 1 K/UL (ref 1–5.1)
LYMPHOCYTES RELATIVE PERCENT: 14.7 %
MCH RBC QN AUTO: 29 PG (ref 26–34)
MCHC RBC AUTO-ENTMCNC: 33 G/DL (ref 31–36)
MCV RBC AUTO: 87.8 FL (ref 80–100)
MONOCYTES ABSOLUTE: 0.6 K/UL (ref 0–1.3)
MONOCYTES RELATIVE PERCENT: 8.4 %
NEUTROPHILS ABSOLUTE: 5.2 K/UL (ref 1.7–7.7)
NEUTROPHILS RELATIVE PERCENT: 76.2 %
PDW BLD-RTO: 16.2 % (ref 12.4–15.4)
PLATELET # BLD: 392 K/UL (ref 135–450)
PMV BLD AUTO: 7.2 FL (ref 5–10.5)
POTASSIUM SERPL-SCNC: 4.9 MMOL/L (ref 3.5–5.1)
RBC # BLD: 3.79 M/UL (ref 4–5.2)
SODIUM BLD-SCNC: 130 MMOL/L (ref 136–145)
TOTAL PROTEIN: 5.5 G/DL (ref 6.4–8.2)
WBC # BLD: 6.8 K/UL (ref 4–11)

## 2022-07-23 PROCEDURE — 6370000000 HC RX 637 (ALT 250 FOR IP): Performed by: SURGERY

## 2022-07-23 PROCEDURE — 6370000000 HC RX 637 (ALT 250 FOR IP): Performed by: INTERNAL MEDICINE

## 2022-07-23 PROCEDURE — 80053 COMPREHEN METABOLIC PANEL: CPT

## 2022-07-23 PROCEDURE — 2580000003 HC RX 258: Performed by: INTERNAL MEDICINE

## 2022-07-23 PROCEDURE — 1200000000 HC SEMI PRIVATE

## 2022-07-23 PROCEDURE — 85025 COMPLETE CBC W/AUTO DIFF WBC: CPT

## 2022-07-23 PROCEDURE — 2580000003 HC RX 258: Performed by: SURGERY

## 2022-07-23 RX ORDER — SODIUM CHLORIDE 9 MG/ML
INJECTION, SOLUTION INTRAVENOUS CONTINUOUS
Status: DISCONTINUED | OUTPATIENT
Start: 2022-07-23 | End: 2022-07-25

## 2022-07-23 RX ORDER — SENNA AND DOCUSATE SODIUM 50; 8.6 MG/1; MG/1
2 TABLET, FILM COATED ORAL DAILY
Status: DISCONTINUED | OUTPATIENT
Start: 2022-07-23 | End: 2022-07-25

## 2022-07-23 RX ORDER — LIDOCAINE 4 G/G
1 PATCH TOPICAL DAILY
Status: DISCONTINUED | OUTPATIENT
Start: 2022-07-23 | End: 2022-07-27 | Stop reason: HOSPADM

## 2022-07-23 RX ADMIN — FOLIC ACID 1 MG: 1 TABLET ORAL at 08:33

## 2022-07-23 RX ADMIN — OXYCODONE AND ACETAMINOPHEN 2 TABLET: 5; 325 TABLET ORAL at 18:33

## 2022-07-23 RX ADMIN — APIXABAN 10 MG: 5 TABLET, FILM COATED ORAL at 08:33

## 2022-07-23 RX ADMIN — POLYETHYLENE GLYCOL 3350 17 G: 17 POWDER, FOR SOLUTION ORAL at 14:51

## 2022-07-23 RX ADMIN — Medication 10.5 MG: at 21:22

## 2022-07-23 RX ADMIN — APIXABAN 10 MG: 5 TABLET, FILM COATED ORAL at 21:21

## 2022-07-23 RX ADMIN — SODIUM CHLORIDE: 9 INJECTION, SOLUTION INTRAVENOUS at 00:32

## 2022-07-23 RX ADMIN — OXYCODONE AND ACETAMINOPHEN 2 TABLET: 5; 325 TABLET ORAL at 08:33

## 2022-07-23 RX ADMIN — OXYCODONE AND ACETAMINOPHEN 2 TABLET: 5; 325 TABLET ORAL at 23:37

## 2022-07-23 RX ADMIN — SODIUM CHLORIDE, PRESERVATIVE FREE 10 ML: 5 INJECTION INTRAVENOUS at 08:34

## 2022-07-23 RX ADMIN — OXYCODONE AND ACETAMINOPHEN 1 TABLET: 5; 325 TABLET ORAL at 14:18

## 2022-07-23 RX ADMIN — SODIUM CHLORIDE: 9 INJECTION, SOLUTION INTRAVENOUS at 11:07

## 2022-07-23 RX ADMIN — SENNOSIDES AND DOCUSATE SODIUM 2 TABLET: 50; 8.6 TABLET ORAL at 14:50

## 2022-07-23 ASSESSMENT — PAIN SCALES - GENERAL
PAINLEVEL_OUTOF10: 8
PAINLEVEL_OUTOF10: 4
PAINLEVEL_OUTOF10: 8
PAINLEVEL_OUTOF10: 8
PAINLEVEL_OUTOF10: 7

## 2022-07-23 ASSESSMENT — PAIN DESCRIPTION - LOCATION
LOCATION: ABDOMEN;LEG
LOCATION: BREAST
LOCATION: ABDOMEN;LEG
LOCATION: ABDOMEN;LEG

## 2022-07-23 ASSESSMENT — PAIN DESCRIPTION - ORIENTATION: ORIENTATION: RIGHT

## 2022-07-23 ASSESSMENT — PAIN DESCRIPTION - DESCRIPTORS
DESCRIPTORS: ACHING;JABBING;SHOOTING
DESCRIPTORS: ACHING

## 2022-07-23 ASSESSMENT — PAIN - FUNCTIONAL ASSESSMENT: PAIN_FUNCTIONAL_ASSESSMENT: PREVENTS OR INTERFERES SOME ACTIVE ACTIVITIES AND ADLS

## 2022-07-23 NOTE — PLAN OF CARE
Problem: Discharge Planning  Goal: Discharge to home or other facility with appropriate resources  7/23/2022 0640 by Stefani Ag RN  Outcome: Progressing     Problem: Pain  Goal: Verbalizes/displays adequate comfort level or baseline comfort level  7/23/2022 0640 by Stefani Ag RN  Outcome: Progressing     Problem: Safety - Adult  Goal: Free from fall injury  7/23/2022 0640 by Stefani Ag RN  Outcome: Progressing     Problem: ABCDS Injury Assessment  Goal: Absence of physical injury  7/23/2022 0640 by Stefani Ag RN  Outcome: Progressing     Problem: Skin/Tissue Integrity  Goal: Absence of new skin breakdown  Description: 1. Monitor for areas of redness and/or skin breakdown  2. Assess vascular access sites hourly  3. Every 4-6 hours minimum:  Change oxygen saturation probe site  4. Every 4-6 hours:  If on nasal continuous positive airway pressure, respiratory therapy assess nares and determine need for appliance change or resting period.   7/23/2022 0640 by Stefani Ag RN  Outcome: Progressing

## 2022-07-23 NOTE — PROGRESS NOTES
Patient is asking for PRN pain medication. Patient's bp is 15/65 and HR is 78. This RN gave one percocet for patient's pain rated as an \"8\" out of 10. See eMAR. This RN explained that patient's bp remains low at this time and patient verbalized understanding. Will continue to monitor pr unit protocols.  Electronically signed by Farrah Du RN on 7/23/2022 at 2:27 PM

## 2022-07-23 NOTE — PROGRESS NOTES
Pt. Remains A/O x4 this shift. Remained in bed for the majority of the day. Pt complains of pain 8/10; requested pain medication. PRN pain medications held r/t to low blood pressure at this time. Pt. Refused PRN tylenol, stating it will not help. /64 at 1550 increased from 106/52. Educated patient appropriately. Will continue to monitor pain and blood pressure. Son at bedside asking what other adaptive equipment may be helpful at discharge. Referred  family to PT/OT for more information. Pt stated she refused PT/OT home evaluation. Educated patient about physical and occupational role in plan of care. Pt. Stated she misunderstood what the therapist said and would be willing to discuss this further before discharge. Pt still complaining of constipation and bloating. PRN medications administered per MAR. Miralax in apple juice at bedside at this time. Pt educated appropriately at this time and verbalizes understanding. Fall precautions remain in place, pt belongings and call light remains within reach. No further needs expressed at this time. Will continue to monitor and assess per unit protocol.  Electronically signed by Khushbu Johnson RN on 7/23/2022 at 6:14 PM

## 2022-07-23 NOTE — PROGRESS NOTES
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 Subjective: pt seen and examined. Just had pain meds and doing better with her RUQ pain. Tolerated chemo yesterday. Denies any new complaints.              Medications:  Reviewed    Infusion Medications    sodium chloride 100 mL/hr at 07/23/22 1107    sodium chloride       Scheduled Medications    sennosides-docusate sodium  2 tablet Oral Daily    apixaban  10 mg Oral BID    folic acid  1 mg Oral Daily    nicotine  1 patch TransDERmal Q24H    melatonin  10.5 mg Oral Nightly    sodium chloride flush  5-40 mL IntraVENous 2 times per day     PRN Meds: sodium chloride flush, sodium chloride, ondansetron **OR** ondansetron, polyethylene glycol, acetaminophen **OR** acetaminophen, perflutren lipid microspheres, morphine **OR** morphine, oxyCODONE-acetaminophen **OR** oxyCODONE-acetaminophen      Intake/Output Summary (Last 24 hours) at 7/23/2022 1111  Last data filed at 7/23/2022 9404  Gross per 24 hour   Intake 250 ml   Output 450 ml   Net -200 ml         Physical Exam Performed:    BP (!) 115/59   Pulse 77   Temp 97.8 °F (36.6 °C) (Oral)   Resp 22   Ht 5' 7\" (1.702 m)   Wt 153 lb 7 oz (69.6 kg)   SpO2 94%   BMI 24.03 kg/m²     Alert and oriented x3  Anicteric   Moist MM  CVS S1 and S2 rrr  Abdomen soft, non distended   Trace leg edema         Labs:   Recent Labs     07/21/22  0420 07/22/22  0719 07/23/22  0623   WBC 7.6 8.5 6.8   HGB 11.8* 12.2 11.0*   HCT 35.2* 36.7 33.3*    413 392       Recent Labs     07/21/22  0420 07/22/22  0719 07/23/22  0623   * 128* 130*   K 5.2* 4.0 4.9   CL 88* 88* 91*   CO2 30 26 25   BUN 16 15 14   CREATININE 0.6 <0.5* <0.5*   CALCIUM 9.4 9.3 8.6       Recent Labs     07/21/22  0420 07/22/22  0719 07/23/22  0623   * 259* 197*   ALT 52* 55* 47*   BILITOT 0.7 0.9 0.8   ALKPHOS 790* 836* 732*       No results for input(s): INR in the last 72 hours. No results for input(s): Jaden Pears in the last 72 hours. Urinalysis:      Lab Results   Component Value Date/Time    NITRU POSITIVE 11/07/2019 04:45 PM    WBCUA  11/07/2019 04:45 PM    BACTERIA Rare 11/07/2019 04:45 PM    RBCUA 20-50 11/07/2019 04:45 PM    BLOODU LARGE 11/07/2019 04:45 PM    SPECGRAV 1.020 11/07/2019 04:45 PM    GLUCOSEU 100 11/07/2019 04:45 PM       Radiology:  XR CHEST PORTABLE   Final Result      1. Right-sided chest port has been placed via the right subclavian vein   since the prior study with the tip in the upper SVC. No evidence of   pneumothorax. 2.  Presumed mass within the left mid lung field is unchanged. 3.  Some opacity in left lower lung field is likely related to a suboptimal   inspiration and otherwise the lungs appear unchanged. FL VASCULAR ACCESS GUIDANCE   Final Result      MRI BRAIN W WO CONTRAST   Final Result   No brain metastases identified. Possible right maxillary sinus mucocele. CT CHEST ABDOMEN PELVIS W CONTRAST   Final Result   1. Large left upper lobe mass abutting the fissure and extending to the hilum   measuring 6.0 x 4.2 cm is consistent with known malignancy. 2. Large filling defect in the right interlobar pulmonary artery consistent   with acute pulmonary embolism and there may be smaller filling defects in   branch vessels. No definite right heart strain. 3. Moderate emphysema. A nodule in the medial right lower lobe measuring 1.0   x 0.8 cm could also be malignant and there are additional scattered tiny   nodules. 4. Diffuse hepatic metastatic disease.    5. Tumor thrombus extends into the intrahepatic IVC and common iliac veins   with possible extension into the external iliac veins but

## 2022-07-23 NOTE — PROGRESS NOTES
Pt. A/O x4 resting in bed with family at bedside. Pt complained of some pain - PRN pain medications administered and effective. Pt tolerated PO intake with no complaints of N/V this AM. Reports some difficulty swallowing large pills. Pt able to tolerate pills one at a time with water this Am. Assessment complete. VSS. Pt. Ambulated to the bathroom this AM to x1 with a walker with no complications. Pt unable to have bowel movement. Not sure when the last bowel movement was per patient. PRN miralax refused this AM. Pt stated its not working. Pt educated appropriately about miralax administration. Dr. Jorge L Schreiber at bedside and aware of constipation. Normal Saline running at 100 into R port with no complications. Port dressing remains clean, dry and intact. Pt and family otherwise satisfied at this time. Fall precautions remain in place. Call light and belongings within reach. Will continue to monitor per unit protocol.  Electronically signed by Kodak Schreiber RN on 7/23/2022 at 11:16 AM

## 2022-07-23 NOTE — PLAN OF CARE
Problem: Discharge Planning  Goal: Discharge to home or other facility with appropriate resources  7/23/2022 1819 by Stu Jay RN  Outcome: Progressing  Flowsheets (Taken 7/23/2022 1819)  Discharge to home or other facility with appropriate resources:   Identify barriers to discharge with patient and caregiver   Identify discharge learning needs (meds, wound care, etc)   Refer to discharge planning if patient needs post-hospital services based on physician order or complex needs related to functional status, cognitive ability or social support system   Arrange for needed discharge resources and transportation as appropriate  7/23/2022 0640 by Mariia Santiago RN  Outcome: Progressing  7/23/2022 0640 by Mariia Santiago RN  Outcome: Progressing     Problem: Pain  Goal: Verbalizes/displays adequate comfort level or baseline comfort level  7/23/2022 1819 by Stu Jay RN  Outcome: Progressing  Flowsheets (Taken 7/23/2022 1819)  Verbalizes/displays adequate comfort level or baseline comfort level:   Encourage patient to monitor pain and request assistance   Assess pain using appropriate pain scale   Administer analgesics based on type and severity of pain and evaluate response   Implement non-pharmacological measures as appropriate and evaluate response  7/23/2022 0640 by Mariia Santiago RN  Outcome: Progressing  7/23/2022 0640 by Mariia Santiago RN  Outcome: Progressing     Problem: Safety - Adult  Goal: Free from fall injury  7/23/2022 1819 by Stu Jay RN  Outcome: Progressing  Flowsheets (Taken 7/22/2022 0932 by Fanny Mcintosh RN)  Free From Fall Injury: Instruct family/caregiver on patient safety  7/23/2022 0640 by Mariia Santiago RN  Outcome: Progressing  7/23/2022 0640 by Mariia Santiago RN  Outcome: Progressing     Problem: ABCDS Injury Assessment  Goal: Absence of physical injury  7/23/2022 1819 by Stu Jay RN  Outcome: Progressing  Flowsheets (Taken 7/22/2022 6920 by Ruy Salgado RN)  Absence of Physical Injury: Implement safety measures based on patient assessment  7/23/2022 0640 by Ernst Boland RN  Outcome: Progressing  7/23/2022 0640 by Ernst Boland RN  Outcome: Progressing     Problem: Skin/Tissue Integrity  Goal: Absence of new skin breakdown  Description: 1. Monitor for areas of redness and/or skin breakdown  2. Assess vascular access sites hourly  3. Every 4-6 hours minimum:  Change oxygen saturation probe site  4. Every 4-6 hours:  If on nasal continuous positive airway pressure, respiratory therapy assess nares and determine need for appliance change or resting period.   7/23/2022 1819 by Chad Contreras RN  Outcome: Progressing  7/23/2022 0640 by Ernst Boland RN  Outcome: Progressing  7/23/2022 0640 by Ernst Boland RN  Outcome: Progressing

## 2022-07-23 NOTE — PROGRESS NOTES
[facility]   Progress Note      SUBJECTIVE:      Events noted  Tolerated chemotherapy well  No nausea  No allergic reactions  Abdominal pain is better  No External bleeding  Constipation +    OBJECTIVE    Physical  VITALS:  BP (!) 106/52   Pulse 84   Temp 97.8 °F (36.6 °C) (Oral)   Resp 21   Ht 5' 7\" (1.702 m)   Wt 153 lb 7 oz (69.6 kg)   SpO2 93%   BMI 24.03 kg/m²   TEMPERATURE:  Current - Temp: 97.8 °F (36.6 °C); Max - Temp  Av.9 °F (36.6 °C)  Min: 97.8 °F (36.6 °C)  Max: 98.1 °F (36.7 °C)  BLOOD PRESSURE RANGE:  Systolic (75EPR), WAJ:955 , Min:105 , KFE:899   ; Diastolic (56QQG), GUT:39, Min:52, Max:68    24HR INTAKE/OUTPUT:    Intake/Output Summary (Last 24 hours) at 2022 1431  Last data filed at 2022 0833  Gross per 24 hour   Intake 250 ml   Output 450 ml   Net -200 ml     Conscious alert and oriented. Does not appear to be in distress. Few basal crackles were heard. Abdomen minimal distention noted. No tenderness noted no guarding. Extremities no edema.       Data  Labs:  General Labs:  CBC with Differential:    Lab Results   Component Value Date/Time    WBC 6.8 2022 06:23 AM    RBC 3.79 2022 06:23 AM    HGB 11.0 2022 06:23 AM    HCT 33.3 2022 06:23 AM     2022 06:23 AM    MCV 87.8 2022 06:23 AM    MCH 29.0 2022 06:23 AM    MCHC 33.0 2022 06:23 AM    RDW 16.2 2022 06:23 AM    LYMPHOPCT 14.7 2022 06:23 AM    MONOPCT 8.4 2022 06:23 AM    BASOPCT 0.6 2022 06:23 AM    MONOSABS 0.6 2022 06:23 AM    LYMPHSABS 1.0 2022 06:23 AM    EOSABS 0.0 2022 06:23 AM    BASOSABS 0.0 2022 06:23 AM     BMP:    Lab Results   Component Value Date/Time     2022 06:23 AM    K 4.9 2022 06:23 AM    K 3.9 2022 06:33 AM    CL 91 2022 06:23 AM    CO2 25 2022 06:23 AM    BUN 14 2022 06:23 AM    LABALBU 2.8 2022 06:23 AM    CREATININE <0.5 2022 06:23 AM    CALCIUM 8.6 07/23/2022 06:23 AM    GFRAA >60 07/23/2022 06:23 AM    LABGLOM >60 07/23/2022 06:23 AM    GLUCOSE 134 07/23/2022 06:23 AM     Hepatic Function Panel:    Lab Results   Component Value Date/Time    ALKPHOS 528 07/23/2022 06:23 AM    ALT 47 07/23/2022 06:23 AM     07/23/2022 06:23 AM    PROT 5.5 07/23/2022 06:23 AM    BILITOT 0.8 07/23/2022 06:23 AM    LABALBU 2.8 07/23/2022 06:23 AM     LDH:  No results found for: LDH  PT/INR:    Lab Results   Component Value Date/Time    PROTIME 14.0 07/19/2022 02:38 PM    INR 1.09 07/19/2022 02:38 PM     PTT:    Lab Results   Component Value Date/Time    APTT 85.3 07/22/2022 07:19 AM   [APTT    Current Medications  Current Facility-Administered Medications: 0.9 % sodium chloride infusion, , IntraVENous, Continuous  sennosides-docusate sodium (SENOKOT-S) 8.6-50 MG tablet 2 tablet, 2 tablet, Oral, Daily  lidocaine 4 % external patch 1 patch, 1 patch, TransDERmal, Daily  apixaban (ELIQUIS) tablet 10 mg, 10 mg, Oral, BID  folic acid (FOLVITE) tablet 1 mg, 1 mg, Oral, Daily  nicotine (NICODERM CQ) 14 MG/24HR 1 patch, 1 patch, TransDERmal, Q24H  melatonin tablet 10.5 mg, 10.5 mg, Oral, Nightly  sodium chloride flush 0.9 % injection 5-40 mL, 5-40 mL, IntraVENous, 2 times per day  sodium chloride flush 0.9 % injection 5-40 mL, 5-40 mL, IntraVENous, PRN  0.9 % sodium chloride infusion, , IntraVENous, PRN  ondansetron (ZOFRAN-ODT) disintegrating tablet 4 mg, 4 mg, Oral, Q8H PRN **OR** ondansetron (ZOFRAN) injection 4 mg, 4 mg, IntraVENous, Q6H PRN  polyethylene glycol (GLYCOLAX) packet 17 g, 17 g, Oral, Daily PRN  acetaminophen (TYLENOL) tablet 650 mg, 650 mg, Oral, Q6H PRN **OR** acetaminophen (TYLENOL) suppository 650 mg, 650 mg, Rectal, Q6H PRN  perflutren lipid microspheres (DEFINITY) injection 1.65 mg, 1.5 mL, IntraVENous, ONCE PRN  morphine (PF) injection 2 mg, 2 mg, IntraVENous, Q2H PRN **OR** morphine (PF) injection 4 mg, 4 mg, IntraVENous, Q2H PRN  oxyCODONE-acetaminophen (PERCOCET) 5-325 MG per tablet 1 tablet, 1 tablet, Oral, Q4H PRN **OR** oxyCODONE-acetaminophen (PERCOCET) 5-325 MG per tablet 2 tablet, 2 tablet, Oral, Q4H PRN    ASSESSMENT AND PLAN    77-year-old lady has    1. Stage IV adenocarcinoma of the lung:    -Received carboplatin and Alimta on 7/22/2022.  -Tolerated it well.  -Plan is to do of pembrolizumab in addition to chemotherapy as outpatient    2. IVC thrombosis:    -Continue Eliquis.     3.  Constipation will do milk of paty Vuong MD

## 2022-07-24 LAB
A/G RATIO: 0.8 (ref 1.1–2.2)
ALBUMIN SERPL-MCNC: 2.8 G/DL (ref 3.4–5)
ALP BLD-CCNC: 781 U/L (ref 40–129)
ALT SERPL-CCNC: 49 U/L (ref 10–40)
ANION GAP SERPL CALCULATED.3IONS-SCNC: 11 MMOL/L (ref 3–16)
AST SERPL-CCNC: 185 U/L (ref 15–37)
BASOPHILS ABSOLUTE: 0 K/UL (ref 0–0.2)
BASOPHILS RELATIVE PERCENT: 0.2 %
BILIRUB SERPL-MCNC: 0.8 MG/DL (ref 0–1)
BUN BLDV-MCNC: 18 MG/DL (ref 7–20)
CALCIUM SERPL-MCNC: 8.9 MG/DL (ref 8.3–10.6)
CHLORIDE BLD-SCNC: 94 MMOL/L (ref 99–110)
CO2: 25 MMOL/L (ref 21–32)
CREAT SERPL-MCNC: <0.5 MG/DL (ref 0.6–1.2)
EOSINOPHILS ABSOLUTE: 0 K/UL (ref 0–0.6)
EOSINOPHILS RELATIVE PERCENT: 0.4 %
GFR AFRICAN AMERICAN: >60
GFR NON-AFRICAN AMERICAN: >60
GLUCOSE BLD-MCNC: 90 MG/DL (ref 70–99)
HCT VFR BLD CALC: 33.9 % (ref 36–48)
HEMOGLOBIN: 11.2 G/DL (ref 12–16)
LYMPHOCYTES ABSOLUTE: 1.8 K/UL (ref 1–5.1)
LYMPHOCYTES RELATIVE PERCENT: 21.2 %
MCH RBC QN AUTO: 29.2 PG (ref 26–34)
MCHC RBC AUTO-ENTMCNC: 33 G/DL (ref 31–36)
MCV RBC AUTO: 88.5 FL (ref 80–100)
MONOCYTES ABSOLUTE: 0.2 K/UL (ref 0–1.3)
MONOCYTES RELATIVE PERCENT: 2.3 %
NEUTROPHILS ABSOLUTE: 6.5 K/UL (ref 1.7–7.7)
NEUTROPHILS RELATIVE PERCENT: 75.9 %
PDW BLD-RTO: 16.4 % (ref 12.4–15.4)
PLATELET # BLD: 423 K/UL (ref 135–450)
PMV BLD AUTO: 7.2 FL (ref 5–10.5)
POTASSIUM SERPL-SCNC: 4.2 MMOL/L (ref 3.5–5.1)
RBC # BLD: 3.84 M/UL (ref 4–5.2)
SODIUM BLD-SCNC: 130 MMOL/L (ref 136–145)
TOTAL PROTEIN: 6.1 G/DL (ref 6.4–8.2)
WBC # BLD: 8.6 K/UL (ref 4–11)

## 2022-07-24 PROCEDURE — 6370000000 HC RX 637 (ALT 250 FOR IP): Performed by: INTERNAL MEDICINE

## 2022-07-24 PROCEDURE — 2580000003 HC RX 258: Performed by: SURGERY

## 2022-07-24 PROCEDURE — 6370000000 HC RX 637 (ALT 250 FOR IP): Performed by: SURGERY

## 2022-07-24 PROCEDURE — 1200000000 HC SEMI PRIVATE

## 2022-07-24 PROCEDURE — 85025 COMPLETE CBC W/AUTO DIFF WBC: CPT

## 2022-07-24 PROCEDURE — 80053 COMPREHEN METABOLIC PANEL: CPT

## 2022-07-24 PROCEDURE — 94760 N-INVAS EAR/PLS OXIMETRY 1: CPT

## 2022-07-24 PROCEDURE — 36591 DRAW BLOOD OFF VENOUS DEVICE: CPT

## 2022-07-24 RX ADMIN — SENNOSIDES AND DOCUSATE SODIUM 2 TABLET: 50; 8.6 TABLET ORAL at 09:35

## 2022-07-24 RX ADMIN — OXYCODONE AND ACETAMINOPHEN 1 TABLET: 5; 325 TABLET ORAL at 11:21

## 2022-07-24 RX ADMIN — OXYCODONE AND ACETAMINOPHEN 2 TABLET: 5; 325 TABLET ORAL at 04:44

## 2022-07-24 RX ADMIN — SODIUM CHLORIDE, PRESERVATIVE FREE 10 ML: 5 INJECTION INTRAVENOUS at 09:35

## 2022-07-24 RX ADMIN — APIXABAN 10 MG: 5 TABLET, FILM COATED ORAL at 09:34

## 2022-07-24 RX ADMIN — OXYCODONE AND ACETAMINOPHEN 2 TABLET: 5; 325 TABLET ORAL at 16:14

## 2022-07-24 RX ADMIN — APIXABAN 10 MG: 5 TABLET, FILM COATED ORAL at 20:34

## 2022-07-24 RX ADMIN — SODIUM CHLORIDE, PRESERVATIVE FREE 10 ML: 5 INJECTION INTRAVENOUS at 20:35

## 2022-07-24 RX ADMIN — FOLIC ACID 1 MG: 1 TABLET ORAL at 09:35

## 2022-07-24 RX ADMIN — POLYETHYLENE GLYCOL 3350 17 G: 17 POWDER, FOR SOLUTION ORAL at 13:28

## 2022-07-24 RX ADMIN — OXYCODONE AND ACETAMINOPHEN 1 TABLET: 5; 325 TABLET ORAL at 20:32

## 2022-07-24 ASSESSMENT — PAIN DESCRIPTION - FREQUENCY
FREQUENCY: CONTINUOUS

## 2022-07-24 ASSESSMENT — PAIN DESCRIPTION - ONSET
ONSET: ON-GOING

## 2022-07-24 ASSESSMENT — PAIN SCALES - WONG BAKER
WONGBAKER_NUMERICALRESPONSE: 0

## 2022-07-24 ASSESSMENT — PAIN DESCRIPTION - ORIENTATION
ORIENTATION: RIGHT;LEFT
ORIENTATION: RIGHT
ORIENTATION: RIGHT;LOWER
ORIENTATION: RIGHT
ORIENTATION: RIGHT

## 2022-07-24 ASSESSMENT — PAIN DESCRIPTION - PAIN TYPE
TYPE: ACUTE PAIN;SURGICAL PAIN
TYPE: ACUTE PAIN
TYPE: SURGICAL PAIN;ACUTE PAIN
TYPE: SURGICAL PAIN;ACUTE PAIN
TYPE: ACUTE PAIN
TYPE: ACUTE PAIN;SURGICAL PAIN
TYPE: ACUTE PAIN;SURGICAL PAIN

## 2022-07-24 ASSESSMENT — PAIN DESCRIPTION - LOCATION
LOCATION: ABDOMEN

## 2022-07-24 ASSESSMENT — PAIN DESCRIPTION - DESCRIPTORS
DESCRIPTORS: ACHING;DISCOMFORT
DESCRIPTORS: ACHING;DISCOMFORT
DESCRIPTORS: ACHING
DESCRIPTORS: ACHING;DISCOMFORT
DESCRIPTORS: JABBING;OTHER (COMMENT)
DESCRIPTORS: ACHING
DESCRIPTORS: ACHING;DISCOMFORT

## 2022-07-24 ASSESSMENT — PAIN SCALES - GENERAL
PAINLEVEL_OUTOF10: 8
PAINLEVEL_OUTOF10: 6
PAINLEVEL_OUTOF10: 6
PAINLEVEL_OUTOF10: 0
PAINLEVEL_OUTOF10: 7
PAINLEVEL_OUTOF10: 5
PAINLEVEL_OUTOF10: 0
PAINLEVEL_OUTOF10: 5
PAINLEVEL_OUTOF10: 0
PAINLEVEL_OUTOF10: 4

## 2022-07-24 NOTE — PROGRESS NOTES
Patient is A&OX4. Patient is resting in bed, awake and quiet. Family is at bedside. Room air. Side rails are up x3. Fall precautions are in place. Bed alarm on. Bed is in lowest position. Call light, telephone and bedside table are within reach. VSS taken. AM meds given. Shift assessment completed. Will continue to monitor patient per unit protocols.  Electronically signed by Hector Gross RN on 7/24/2022 at 12:10 PM

## 2022-07-24 NOTE — PROGRESS NOTES
Hospitalist Progress Note      PCP: Omari Power MD    Date of Admission: 7/19/2022        Hospital Course: Patient admitted with complaints of RUQ pain , managed for metastatic SCLC, acute PE and IVC thrombosis. Started on chemo this admission. Subjective: pt seen and examined. No new complaints. No nausea, vomiting or chest pain. Medications:  Reviewed    Infusion Medications    sodium chloride Stopped (07/23/22 2128)    sodium chloride       Scheduled Medications    sennosides-docusate sodium  2 tablet Oral Daily    lidocaine  1 patch TransDERmal Daily    apixaban  10 mg Oral BID    folic acid  1 mg Oral Daily    nicotine  1 patch TransDERmal Q24H    melatonin  10.5 mg Oral Nightly    sodium chloride flush  5-40 mL IntraVENous 2 times per day     PRN Meds: sodium chloride flush, sodium chloride, ondansetron **OR** ondansetron, polyethylene glycol, acetaminophen **OR** acetaminophen, perflutren lipid microspheres, morphine **OR** morphine, oxyCODONE-acetaminophen **OR** oxyCODONE-acetaminophen      Intake/Output Summary (Last 24 hours) at 7/24/2022 1258  Last data filed at 7/24/2022 0942  Gross per 24 hour   Intake 840 ml   Output 1000 ml   Net -160 ml       Physical Exam Performed:    BP (!) 113/56   Pulse 80   Temp 98.8 °F (37.1 °C) (Oral)   Resp 23   Ht 5' 7\" (1.702 m)   Wt 156 lb 8.4 oz (71 kg)   SpO2 94%   BMI 24.52 kg/m²     General appearance: No apparent distress, appears stated age and cooperative. HEENT: Pupils equal, round, and reactive to light. Conjunctivae/corneas clear. Respiratory:  Normal respiratory effort. Clear to auscultation  Cardiovascular: Regular rate and rhythm with normal S1/S2 without murmurs, rubs or gallops. Abdomen: Soft, RUQ tender to palpation  Musculoskeletal: No clubbing, cyanosis or edema bilaterally.      Neurologic:  grossly non-focal.  Psychiatric: Alert and oriented, thought content appropriate, normal insight        Labs:   Recent Labs 07/22/22  0719 07/23/22  0623 07/24/22  0543   WBC 8.5 6.8 8.6   HGB 12.2 11.0* 11.2*   HCT 36.7 33.3* 33.9*    392 423     Recent Labs     07/22/22  0719 07/23/22  0623 07/24/22  0543   * 130* 130*   K 4.0 4.9 4.2   CL 88* 91* 94*   CO2 26 25 25   BUN 15 14 18   CREATININE <0.5* <0.5* <0.5*   CALCIUM 9.3 8.6 8.9     Recent Labs     07/22/22  0719 07/23/22  0623 07/24/22  0543   * 197* 185*   ALT 55* 47* 49*   BILITOT 0.9 0.8 0.8   ALKPHOS 836* 732* 781*     No results for input(s): INR in the last 72 hours. No results for input(s): Curlie Lat in the last 72 hours. Urinalysis:      Lab Results   Component Value Date/Time    NITRU POSITIVE 11/07/2019 04:45 PM    WBCUA  11/07/2019 04:45 PM    BACTERIA Rare 11/07/2019 04:45 PM    RBCUA 20-50 11/07/2019 04:45 PM    BLOODU LARGE 11/07/2019 04:45 PM    SPECGRAV 1.020 11/07/2019 04:45 PM    GLUCOSEU 100 11/07/2019 04:45 PM       Radiology:  XR CHEST PORTABLE   Final Result      1. Right-sided chest port has been placed via the right subclavian vein   since the prior study with the tip in the upper SVC. No evidence of   pneumothorax. 2.  Presumed mass within the left mid lung field is unchanged. 3.  Some opacity in left lower lung field is likely related to a suboptimal   inspiration and otherwise the lungs appear unchanged. FL VASCULAR ACCESS GUIDANCE   Final Result      MRI BRAIN W WO CONTRAST   Final Result   No brain metastases identified. Possible right maxillary sinus mucocele. CT CHEST ABDOMEN PELVIS W CONTRAST   Final Result   1. Large left upper lobe mass abutting the fissure and extending to the hilum   measuring 6.0 x 4.2 cm is consistent with known malignancy. 2. Large filling defect in the right interlobar pulmonary artery consistent   with acute pulmonary embolism and there may be smaller filling defects in   branch vessels. No definite right heart strain. 3. Moderate emphysema.   A

## 2022-07-24 NOTE — PLAN OF CARE
Problem: Discharge Planning  Goal: Discharge to home or other facility with appropriate resources  Outcome: Progressing     Problem: Pain  Goal: Verbalizes/displays adequate comfort level or baseline comfort level  Outcome: Progressing   Patient educated on acute pain. Taught patient to use call light to ask for pain medication. PRN pain medication given for acute pain. Will continue to monitor pain per unit protocol. Problem: Safety - Adult  Goal: Free from fall injury  Outcome: Progressing   Will remain free from falls. Fall precautions are in place. Call light, telephone and bedside table are within reach. Problem: ABCDS Injury Assessment  Goal: Absence of physical injury  Outcome: Progressing     Problem: Skin/Tissue Integrity  Goal: Absence of new skin breakdown  Description: 1. Monitor for areas of redness and/or skin breakdown  2. Assess vascular access sites hourly  3. Every 4-6 hours minimum:  Change oxygen saturation probe site  4. Every 4-6 hours:  If on nasal continuous positive airway pressure, respiratory therapy assess nares and determine need for appliance change or resting period. Outcome: Progressing   Will monitor skin and mucous members. Will turn patient every 2 hours, monitor for friction and sheering, and change dressings as needed. Will preform skin assessment every shift.

## 2022-07-24 NOTE — PROGRESS NOTES
Patient is resting in bed, awake and quiet. Fall precautions are in place. Bed alarm on. Bed is in lowest position. Call light, telephone and bedside table are within reach. PRN pain medication provided. See eMAR. Will continue to monitor patient per unit protocols.  Electronically signed by Angela Claudio RN on 7/24/2022 at 5:15 PM

## 2022-07-24 NOTE — FLOWSHEET NOTE
07/23/22 2120   Assessment   Charting Type Shift assessment   Psychosocial   Psychosocial (WDL) WDL   Neurological   Neuro (WDL) X   Level of Consciousness Alert (0)   Orientation Level Oriented X4   Cognition Appropriate judgement; Appropriate safety awareness; Follows commands; Appropriate attention/concentration; Appropriate for developmental age   Speech Clear; Appropriate for developmental age   R Hand  Strong   L Hand  Strong   R Foot Dorsiflexion Moderate   L Foot Dorsiflexion Moderate   R Foot Plantar Flexion Weak   L Foot Plantar Flexion Weak   RUE Motor Response Normal flexion; Non-purposeful movement   LUE Motor Response Normal flexion; Normal extension; Responds to command   RLE Motor Response Responds to command;Normal flexion; Normal extension   LLE Motor Response Normal flexion; Normal extension; Responds to command   Gag Present   [place] Coma Scale   Eye Opening 4   Best Verbal Response 5   Best Motor Response 6   [place] Coma Scale Score 15   HEENT (Head, Ears, Eyes, Nose, & Throat)   HEENT (WDL) X   Right Eye Impaired vision   Left Eye Impaired vision   Teeth Partial plate upper;Partial plate lower   Respiratory   Respiratory (WDL) WDL   Respiratory Pattern Regular   Respiratory Depth Normal   Respiratory Quality/Effort Unlabored   Chest Assessment Chest expansion symmetrical;Trachea midline   L Breath Sounds Clear   R Breath Sounds Clear   Subcutaneous Air/Crepitus None   Cardiac   Cardiac (WDL) WDL   Cardiac Regularity Regular   Heart Sounds S1, S2   Cardiac Rhythm Sinus rhythm   Rhythm Interpretation   RN Validation Agree with rhythm interpretation and measurements   Cardiac Monitor   Alarm Audible Yes   Telemetry Box Number    Telemetry Monitor Alarm Parameters    Gastrointestinal   Abdominal (WDL) X   Abdomen Inspection Soft;Taut;Rounded   RUQ Bowel Sounds Active   LUQ Bowel Sounds Active   RLQ Bowel Sounds Active   LLQ Bowel Sounds Active   GI Symptoms Constipation;Cramping;Distention   Tenderness Soft;Tenderness;RUQ;No guarding; No rebound   Constipation Precipitating Factors Medication (iron supplements, opiods, antidepressants etc)   Problems with Passing Stool Incomplete passage of stool   Passing Flatus Y   Genitourinary   Genitourinary (WDL) WDL   Suprapubic Tenderness No   Dysuria (Pain/Burning w/Urination) No   Urine Assessment   Urinary Status Voiding   Urinary Incontinence Absent   Urine Color Yellow/straw   Urine Appearance Clear   Urine Odor No odor   Peripheral Vascular   Peripheral Vascular (WDL) X   Edema Right lower extremity; Left upper extremity   RLE Edema +2;Pitting   LLE Edema +1;Pitting   RUE Neurovascular Assessment   RUE Sensation  Full sensation; No numbness; No pain; No tingling   R Radial Pulse +2 (Moderate)   LUE Neurovascular Assessment   LUE Sensation  Full sensation;Pain; No pain; No numbness; No tingling   L Radial Pulse +2 (Moderate)   RLE Neurovascular Assessment   RLE Sensation  Tingling; No pain; No numbness   R Pedal Pulse +2   R Calf Tenderness  Negative   LLE Neurovascular Assessment   LLE Sensation  Full sensation   L Pedal Pulse +2   L Calf Tenderness Negative   Skin Integumentary    Skin Integumentary (WDL) X   Skin Color Blanchable erythema;Pale   Skin Condition/Temp Dry;Flaky; Warm   Skin Integrity Incision (see LDA)   Location RLE   Skin Integrity Site 2   Skin Integrity Location 2 Redness   Location 2 buttocks   Assessed this shift Yes   Care Plan - Skin/Tissue Integrity Goals   Skin Integrity Remains Intact Monitor for areas of redness and/or skin breakdown   Musculoskeletal   Musculoskeletal (WDL) X   RUE Other (comment)   LUE Other (comment)   RL Extremity Weakness;Surgery; Swelling   LL Extremity Weakness; Unsteady

## 2022-07-25 PROCEDURE — 97530 THERAPEUTIC ACTIVITIES: CPT

## 2022-07-25 PROCEDURE — 1200000000 HC SEMI PRIVATE

## 2022-07-25 PROCEDURE — 6370000000 HC RX 637 (ALT 250 FOR IP): Performed by: SURGERY

## 2022-07-25 PROCEDURE — 97535 SELF CARE MNGMENT TRAINING: CPT

## 2022-07-25 PROCEDURE — 6370000000 HC RX 637 (ALT 250 FOR IP): Performed by: INTERNAL MEDICINE

## 2022-07-25 PROCEDURE — 2580000003 HC RX 258: Performed by: SURGERY

## 2022-07-25 PROCEDURE — 94760 N-INVAS EAR/PLS OXIMETRY 1: CPT

## 2022-07-25 PROCEDURE — 6370000000 HC RX 637 (ALT 250 FOR IP): Performed by: FAMILY MEDICINE

## 2022-07-25 PROCEDURE — 97116 GAIT TRAINING THERAPY: CPT

## 2022-07-25 RX ORDER — OXYCODONE HYDROCHLORIDE AND ACETAMINOPHEN 5; 325 MG/1; MG/1
2 TABLET ORAL EVERY 4 HOURS PRN
Qty: 12 TABLET | Refills: 0 | Status: SHIPPED | OUTPATIENT
Start: 2022-07-25 | End: 2022-07-28

## 2022-07-25 RX ORDER — FOLIC ACID 1 MG/1
1 TABLET ORAL DAILY
Qty: 30 TABLET | Refills: 3 | Status: ON HOLD | COMMUNITY
Start: 2022-07-26 | End: 2022-08-16 | Stop reason: HOSPADM

## 2022-07-25 RX ORDER — LIDOCAINE 4 G/G
1 PATCH TOPICAL DAILY
Qty: 6 PATCH | Refills: 1 | Status: ON HOLD
Start: 2022-07-26 | End: 2022-08-16 | Stop reason: HOSPADM

## 2022-07-25 RX ORDER — SENNA AND DOCUSATE SODIUM 50; 8.6 MG/1; MG/1
2 TABLET, FILM COATED ORAL DAILY PRN
Status: DISCONTINUED | OUTPATIENT
Start: 2022-07-25 | End: 2022-07-27 | Stop reason: HOSPADM

## 2022-07-25 RX ORDER — SENNA AND DOCUSATE SODIUM 50; 8.6 MG/1; MG/1
1 TABLET, FILM COATED ORAL DAILY
Qty: 30 TABLET | Refills: 0 | Status: ON HOLD
Start: 2022-07-25 | End: 2022-08-16 | Stop reason: HOSPADM

## 2022-07-25 RX ORDER — POLYETHYLENE GLYCOL 3350 17 G/17G
17 POWDER, FOR SOLUTION ORAL DAILY
Status: DISCONTINUED | OUTPATIENT
Start: 2022-07-26 | End: 2022-07-27 | Stop reason: HOSPADM

## 2022-07-25 RX ORDER — POLYETHYLENE GLYCOL 3350 17 G/17G
17 POWDER, FOR SOLUTION ORAL DAILY
Qty: 255 G | Refills: 1 | Status: SHIPPED | OUTPATIENT
Start: 2022-07-25 | End: 2022-08-12

## 2022-07-25 RX ADMIN — OXYCODONE AND ACETAMINOPHEN 2 TABLET: 5; 325 TABLET ORAL at 14:21

## 2022-07-25 RX ADMIN — Medication 10.5 MG: at 02:14

## 2022-07-25 RX ADMIN — NALOXEGOL OXALATE 12.5 MG: 12.5 TABLET, FILM COATED ORAL at 12:36

## 2022-07-25 RX ADMIN — OXYCODONE AND ACETAMINOPHEN 2 TABLET: 5; 325 TABLET ORAL at 08:09

## 2022-07-25 RX ADMIN — FOLIC ACID 1 MG: 1 TABLET ORAL at 07:55

## 2022-07-25 RX ADMIN — OXYCODONE AND ACETAMINOPHEN 2 TABLET: 5; 325 TABLET ORAL at 02:11

## 2022-07-25 RX ADMIN — Medication 10.5 MG: at 20:57

## 2022-07-25 RX ADMIN — OXYCODONE AND ACETAMINOPHEN 2 TABLET: 5; 325 TABLET ORAL at 18:28

## 2022-07-25 RX ADMIN — SODIUM CHLORIDE, PRESERVATIVE FREE 10 ML: 5 INJECTION INTRAVENOUS at 20:58

## 2022-07-25 RX ADMIN — OXYCODONE AND ACETAMINOPHEN 2 TABLET: 5; 325 TABLET ORAL at 22:14

## 2022-07-25 RX ADMIN — SODIUM CHLORIDE, PRESERVATIVE FREE 10 ML: 5 INJECTION INTRAVENOUS at 07:56

## 2022-07-25 RX ADMIN — APIXABAN 10 MG: 5 TABLET, FILM COATED ORAL at 07:55

## 2022-07-25 RX ADMIN — SENNOSIDES AND DOCUSATE SODIUM 2 TABLET: 50; 8.6 TABLET ORAL at 07:55

## 2022-07-25 RX ADMIN — POLYETHYLENE GLYCOL 3350 17 G: 17 POWDER, FOR SOLUTION ORAL at 08:09

## 2022-07-25 RX ADMIN — APIXABAN 10 MG: 5 TABLET, FILM COATED ORAL at 20:58

## 2022-07-25 ASSESSMENT — PAIN SCALES - GENERAL
PAINLEVEL_OUTOF10: 0
PAINLEVEL_OUTOF10: 0
PAINLEVEL_OUTOF10: 8
PAINLEVEL_OUTOF10: 0
PAINLEVEL_OUTOF10: 7
PAINLEVEL_OUTOF10: 8
PAINLEVEL_OUTOF10: 0
PAINLEVEL_OUTOF10: 7
PAINLEVEL_OUTOF10: 5

## 2022-07-25 ASSESSMENT — PAIN DESCRIPTION - ONSET
ONSET: ON-GOING
ONSET: ON-GOING
ONSET: GRADUAL
ONSET: ON-GOING
ONSET: GRADUAL

## 2022-07-25 ASSESSMENT — PAIN DESCRIPTION - ORIENTATION
ORIENTATION: RIGHT;LEFT
ORIENTATION: RIGHT;LEFT
ORIENTATION: RIGHT;LOWER
ORIENTATION: RIGHT
ORIENTATION: RIGHT
ORIENTATION: RIGHT;LEFT

## 2022-07-25 ASSESSMENT — PAIN DESCRIPTION - PAIN TYPE
TYPE: ACUTE PAIN

## 2022-07-25 ASSESSMENT — PAIN DESCRIPTION - LOCATION
LOCATION: ABDOMEN

## 2022-07-25 ASSESSMENT — PAIN DESCRIPTION - FREQUENCY
FREQUENCY: CONTINUOUS

## 2022-07-25 ASSESSMENT — PAIN DESCRIPTION - DESCRIPTORS
DESCRIPTORS: ACHING

## 2022-07-25 NOTE — PROGRESS NOTES
Hospitalist Progress Note      PCP: Raúl Gonsalves MD    Date of Admission: 7/19/2022        Hospital Course: Patient admitted with complaints of RUQ pain , managed for metastatic SCLC, acute PE and IVC thrombosis. Started on chemo this admission. Subjective: Pt S/E. No acute events. Pt is tolerating chemo. Pain is controlled for now. No bm since admission. Medications:  Reviewed    Infusion Medications    sodium chloride Stopped (07/23/22 2128)    sodium chloride       Scheduled Medications    sennosides-docusate sodium  2 tablet Oral Daily    lidocaine  1 patch TransDERmal Daily    apixaban  10 mg Oral BID    folic acid  1 mg Oral Daily    nicotine  1 patch TransDERmal Q24H    melatonin  10.5 mg Oral Nightly    sodium chloride flush  5-40 mL IntraVENous 2 times per day     PRN Meds: sodium chloride flush, sodium chloride, ondansetron **OR** ondansetron, polyethylene glycol, acetaminophen **OR** acetaminophen, perflutren lipid microspheres, morphine **OR** morphine, oxyCODONE-acetaminophen **OR** oxyCODONE-acetaminophen      Intake/Output Summary (Last 24 hours) at 7/25/2022 1012  Last data filed at 7/25/2022 4553  Gross per 24 hour   Intake 360 ml   Output 1150 ml   Net -790 ml       Physical Exam Performed:    BP (!) 112/58   Pulse 83   Temp 97.4 °F (36.3 °C) (Axillary)   Resp 14   Ht 5' 7\" (1.702 m)   Wt 161 lb 9.6 oz (73.3 kg)   SpO2 97%   BMI 25.31 kg/m²     General appearance: No apparent distress, appears stated age and cooperative. HEENT: Pupils equal, round, and reactive to light. Conjunctivae/corneas clear. Respiratory:  Normal respiratory effort. Clear to auscultation  Cardiovascular: Regular rate and rhythm with normal S1/S2 without murmurs, rubs or gallops. Abdomen: Soft, RUQ tender to palpation  Musculoskeletal: No clubbing, cyanosis or edema bilaterally.      Neurologic:  grossly non-focal.  Psychiatric: Alert and oriented x3, thought content appropriate, normal insight        Labs:   Recent Labs     07/23/22  0623 07/24/22  0543   WBC 6.8 8.6   HGB 11.0* 11.2*   HCT 33.3* 33.9*    423     Recent Labs     07/23/22  0623 07/24/22  0543   * 130*   K 4.9 4.2   CL 91* 94*   CO2 25 25   BUN 14 18   CREATININE <0.5* <0.5*   CALCIUM 8.6 8.9     Recent Labs     07/23/22  0623 07/24/22  0543   * 185*   ALT 47* 49*   BILITOT 0.8 0.8   ALKPHOS 732* 781*     No results for input(s): INR in the last 72 hours. No results for input(s): Tami Horn in the last 72 hours. Urinalysis:      Lab Results   Component Value Date/Time    NITRU POSITIVE 11/07/2019 04:45 PM    WBCUA  11/07/2019 04:45 PM    BACTERIA Rare 11/07/2019 04:45 PM    RBCUA 20-50 11/07/2019 04:45 PM    BLOODU LARGE 11/07/2019 04:45 PM    SPECGRAV 1.020 11/07/2019 04:45 PM    GLUCOSEU 100 11/07/2019 04:45 PM       Radiology:  XR CHEST PORTABLE   Final Result      1. Right-sided chest port has been placed via the right subclavian vein   since the prior study with the tip in the upper SVC. No evidence of   pneumothorax. 2.  Presumed mass within the left mid lung field is unchanged. 3.  Some opacity in left lower lung field is likely related to a suboptimal   inspiration and otherwise the lungs appear unchanged. FL VASCULAR ACCESS GUIDANCE   Final Result      MRI BRAIN W WO CONTRAST   Final Result   No brain metastases identified. Possible right maxillary sinus mucocele. CT CHEST ABDOMEN PELVIS W CONTRAST   Final Result   1. Large left upper lobe mass abutting the fissure and extending to the hilum   measuring 6.0 x 4.2 cm is consistent with known malignancy. 2. Large filling defect in the right interlobar pulmonary artery consistent   with acute pulmonary embolism and there may be smaller filling defects in   branch vessels. No definite right heart strain. 3. Moderate emphysema.   A nodule in the medial right lower lobe measuring 1.0   x 0.8 cm could also be malignant and there are additional scattered tiny   nodules. 4. Diffuse hepatic metastatic disease. 5. Tumor thrombus extends into the intrahepatic IVC and common iliac veins   with possible extension into the external iliac veins but no definite   extension into the renal veins. 6. Prominent retrocrural, right upper quadrant, and retroperitoneal lymph   nodes also suspicious for metastatic disease. Findings were given to CNP Penni Severs in the ED on 7/19/2022 at 5:38 p.m. Assessment/Plan:    Active Hospital Problems    Diagnosis     Acute pulmonary embolism ([address] [I26.99]      Priority: Medium    Metastatic non-small cell lung cancer ([address] [C34.90]      Priority: Medium     Acute Pulmonary Embolism with thromboembolism of IVC  - CT chest: Large left upper lobe mass abutting the fissure and extending to the hilum measuring 6.0 x 4.2 cm consistent with known malignancy. There is a large filling defect in the right interlobar pulmonary artery consistent with acute pulmonary embolism with possibility of smaller filling defects in the branch vessels. No definitive heart strain. There is tumor thrombus extending into the intrahepatic IVC and common iliac veins  - started Eliquis   - Echo - no evidence of right heart strain  - bilateral lower ext doppler - no evidence of DVT  - cardiology consulted - no utility in thrombectomy after reviewing films     Metastatic non-small cell lung cancer  - CT showing multiple lung nodules, diffuse hepatic metastatic disease with tumor thrombus extending into intrahepatic IVC and common iliac veins with possible extension into external iliac veins - no definite extension into renal veins.   Prominent retrocrural, right upper quadrant and retroperitoneal lymph nodes suspicious for metastatic disease  - [facility] consulted -started on chemo  - radiation oncology to start treatment tomorrow   - MRI Brain with and without contrast did not show any evidence of metastatic disease  - general surgery consulted for port placement    Constipation  - will give movantik, bowel regimen    DVT Prophylaxis: eliquis  Diet: ADULT DIET; Regular  Code Status: Full Code        Dispo - possible dc home in 1-2 days pending [progress, clearance from oncology.     Christelle Mckeon, DO

## 2022-07-25 NOTE — PROGRESS NOTES
Hematology Oncology Daily Progress Note    Admit Date: 7/19/2022  Hospital day several    Subjective:     Patient has complaints of improving abdominal pain--denies sob/cp. Medication side effects: none    Scheduled Meds:   naloxegol  12.5 mg Oral QAM AC    sennosides-docusate sodium  2 tablet Oral Daily    lidocaine  1 patch TransDERmal Daily    apixaban  10 mg Oral BID    folic acid  1 mg Oral Daily    nicotine  1 patch TransDERmal Q24H    melatonin  10.5 mg Oral Nightly    sodium chloride flush  5-40 mL IntraVENous 2 times per day     Continuous Infusions:   sodium chloride Stopped (07/23/22 2128)    sodium chloride       PRN Meds:sodium chloride flush, sodium chloride, ondansetron **OR** ondansetron, polyethylene glycol, acetaminophen **OR** acetaminophen, perflutren lipid microspheres, morphine **OR** morphine, oxyCODONE-acetaminophen **OR** oxyCODONE-acetaminophen    Review of Systems  Pertinent items are noted in HPI. REVIEW OF SYSTEMS:         Constitutional: Denies fever, sweats, weight loss     Eyes: No visual changes or diplopia. No scleral icterus. ENT: No Headaches, hearing loss or vertigo. No mouth sores or sore throat. Cardiovascular: No chest pain, dyspnea on exertion, palpitations or loss of consciousness. Respiratory: No cough or wheezing, no sputum production. No hemoptysis. .    Gastrointestinal: No abdominal pain, appetite loss, blood in stools. No change in bowel habits. Genitourinary: No dysuria, trouble voiding, or hematuria. Musculoskeletal:  Generalized weakness. No joint complaints. Integumentary: No rash or pruritis. Neurological: No headache, diplopia. No change in gait, balance, or coordination. No paresthesias. Endocrine: No temperature intolerance. No excessive thirst, fluid intake, or urination. Hematologic/Lymphatic: No abnormal bruising or ecchymoses, blood clots or swollen lymph nodes. Allergic/Immunologic: No nasal congestion or hives.        Objective:     Patient Vitals for the past 8 hrs:   BP Temp Temp src Pulse Resp SpO2 Weight   07/25/22 1212 110/63 98.1 °F (36.7 °C) Oral 80 18 95 % --   07/25/22 0815 (!) 112/58 97.4 °F (36.3 °C) Axillary 83 14 97 % --   07/25/22 0800 -- -- -- 82 16 94 % --   07/25/22 0656 -- -- -- -- -- -- 161 lb 9.6 oz (73.3 kg)     I/O last 3 completed shifts: In: 1080 [P.O.:1080]  Out: 200 [Urine:1900]  I/O this shift:  In: 360 [P.O.:360]  Out: 400 [Urine:400]    /63   Pulse 80   Temp 98.1 °F (36.7 °C) (Oral)   Resp 18   Ht 5' 7\" (1.702 m)   Wt 161 lb 9.6 oz (73.3 kg)   SpO2 95%   BMI 25.31 kg/m²     General Appearance:    Alert, cooperative, no distress, appears stated age   Head:    Normocephalic, without obvious abnormality, atraumatic   Eyes:    PERRL, conjunctiva/corneas clear, EOM's intact, fundi     benign, both eyes        Ears:    Normal TM's and external ear canals, both ears   Nose:   Nares normal, septum midline, mucosa normal, no drainage    or sinus tenderness   Throat:   Lips, mucosa, and tongue normal; teeth and gums normal   Neck:   Supple, symmetrical, trachea midline, no adenopathy;        thyroid:  No enlargement/tenderness/nodules; no carotid    bruit or JVD   Back:     Symmetric, no curvature, ROM normal, no CVA tenderness   Lungs:     Clear to auscultation bilaterally, respirations unlabored   Chest wall:    No tenderness or deformity   Heart:    Regular rate and rhythm, S1 and S2 normal, no murmur, rub   or gallop   Abdomen:     Soft, non-tender, bowel sounds active all four quadrants,     no masses, no organomegaly           Extremities:   Extremities normal, atraumatic, no cyanosis or edema   Pulses:   2+ and symmetric all extremities   Skin:   Skin color, texture, turgor normal, no rashes or lesions   Lymph nodes:   Cervical, supraclavicular, and axillary nodes normal   Neurologic:   CNII-XII intact.  Normal strength, sensation and reflexes       throughout     Data ReviewCBC:   Lab Results   Component Value Date/Time    WBC 8.6 07/24/2022 05:43 AM    RBC 3.84 07/24/2022 05:43 AM       Assessment:     Principal Problem:    Acute pulmonary embolism (HCC)  Active Problems:    Metastatic non-small cell lung cancer ([facility]  Resolved Problems:    * No resolved hospital problems. *      Plan:     1. Stage IV non-small cell lung cancer. She received carboplatin and Alimta on Friday and tolerated it well. 2.  IVC thrombosis. She should be discharged on Eliquis. 3.  Infectious disease. She is afebrile. It is okay to discharge.         Electronically signed by Juan Xavier MD on 7/25/2022 at 12:26 PM

## 2022-07-25 NOTE — PROGRESS NOTES
Occupational Therapy  Facility/Department: [address] ORTHOPEDICS  Occupational Therapy Progress Note    Name: Heide Sheffield  : 1955  MRN: [id number]  Date of Service: 2022    Discharge Recommendations:  24 hour supervision or assist, S Level 3, Home with Home health OT  OT Equipment Recommendations  Walker: Rolling  ADL Assistive Devices: Toileting - 3-in-1 Commode;Transfer Tub Bench; Toileting - Raised Toilet Seat with arms; Reacher     Heide Sheffield scored a 18/24 on the AM-PAC ADL Inpatient form. Current research shows that an AM-PAC score of 18 or greater is typically associated with a discharge to the patient's home setting. Based on the patient's AM-PAC score, and their current ADL deficits, it is recommended that the patient have 2-3 sessions per week of Occupational Therapy at d/c to increase the patient's independence. At this time, this patient demonstrates the endurance and safety to discharge home with [address] [place] [address] (home vs OP services) and a follow up treatment frequency of 2-3x/wk. Please see assessment section for further patient specific details. If patient discharges prior to next session this note will serve as a discharge summary. Please see below for the latest assessment towards goals. Patient Diagnosis(es): The primary encounter diagnosis was Acute pulmonary embolism without acute cor pulmonale, unspecified pulmonary embolism type ([address] 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 Past Medical History:  has a past medical history of Headache. Past Surgical History:  has a past surgical history that includes Appendectomy; Tonsillectomy; and Port Surgery (N/A, 2022). Assessment   Performance deficits / Impairments: Decreased functional mobility ; Decreased endurance;Decreased ADL status; Decreased balance;Decreased posture;Decreased strength;Decreased high-level IADLs  Assessment: 73yr old female admitted for metastatic adenocarcinoma likely from a left upper lobe lung primary with metastatic involvement of liver, soft tissue of the right lower leg, and numerous lymph node sites. 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 Today, 7/25, pt functioning below baseline most limited by abdominal pain and decreased endurance. Pt progressing, able to tolerate short community distance fxl mobility with 1 rest break and CGA/MIN A with RW- inc time d/t abd pain. Pt completes fxl tx with CGA/Min A- cues for eccentric control. Pt obtained ADL DME recommended. Pt educated on importance of energy conservation while continuing to gain endurance- pt receptive. Cont acute OT to address above deficits. Rec OT 2-3x/week to facilitate safe return home and improve IND with ADL and fxl mobility (pt agreeable)  Prognosis: Fair  REQUIRES OT FOLLOW-UP: Yes  Activity Tolerance  Activity Tolerance: Patient limited by pain; Patient limited by fatigue        Plan   Plan  Times per Week: 3-5x/wk  Current Treatment Recommendations: Strengthening, Balance training, Self-Care / ADL, Functional mobility training, Safety education & training, Endurance training, Patient/Caregiver education & training, Coordination training     Restrictions  Restrictions/Precautions  Restrictions/Precautions: Fall Risk  Position Activity Restriction  Other position/activity restrictions: RUQ abdominal pain and R LE pain    Subjective   General  Chart Reviewed: Yes, Orders, Progress Notes, History and Physical, Labs, Imaging  Patient assessed for rehabilitation services?: Yes  Additional Pertinent Hx: Gregorio Lisa Is a 56-year-old woman with what appears to be metastatic adenocarcinoma likely from a left upper lobe lung primary with metastatic involvement of liver, soft tissue of the right lower leg, and numerous lymph node sites.   Family / Caregiver Present: No  Referring Practitioner: Rodo Gentile MD  Diagnosis: RUE quadrant pain;  Subjective  Subjective: Pt in bed, agreeable to OT tx. Pt reporting pain in RUQ (7-8/10) after activity (RN made aware)  General Comment  Comments: RN okay for therapy     Social/Functional History  Social/Functional History  Lives With: Spouse, Daughter  Type of Home: House  Home Layout: Two level, Bed/Bath upstairs, Able to Live on Main level with bedroom/bathroom (pt has been sleeping on 1st floor)  Home Access: Stairs to enter with rails  Entrance Stairs - Number of Steps: 4 MARCI  Entrance Stairs - Rails: Right  Bathroom Shower/Tub: Tub/Shower unit  Bathroom Toilet: Standard  Home Equipment: [employer]  ADL Assistance: Independent  Homemaking Assistance: Independent  Ambulation Assistance: Independent (most recently using cane)  Transfer Assistance: Independent  Active : Yes  Occupation: Full time employment  Type of Occupation: RN from [employer]  Additional Comments: family purchased/obtained Tub transfer bench, bedside commode, and rolling walker       Objective           Observation/Palpation  Body Mechanics: mild/moderate flexed trunk to avoid tightness and pain in abdomen in standing; rounded shoulders; BLE edema, R worse than L. Safety Devices  Type of Devices: All fall risk precautions in place; Patient at risk for falls;Call light within reach; Left in chair;Nurse notified;Gait belt; Chair alarm in place Jeni Chen)  Balance  Sitting:  (SBA at EOB and yellow bedside chair.)  Standing:  (RW PRN tx and standing rest break)  Gait  Overall Level of Assistance: Contact-guard assistance;Minimum assistance (EOB>doorway>mustafa x100 feet>yellow beside chair>recliner. RW with min unsteadiness, increased time and leaned forward d/t abdominal pain)        ADL  Additional Comments: pt declines all ADLs.  Does report able to obtain recommended ADL DME (encouraged pt to use)        Bed mobility  Supine to Sit: Minimal assistance;Contact guard assistance (mild assist with RLE)  Bed Mobility Comments: increased time, effortful and painful  Transfers  Sit to stand: Contact guard assistance  Stand to sit: Contact guard assistance;Minimal assistance (cues for controlled descent)  Transfer Comments: RW. cues hand placement     Cognition  Overall Cognitive Status: WFL  Orientation  Overall Orientation Status: Within Functional Limits                  Education Given To: Patient  Education Provided: Role of Therapy;Plan of Care;Precautions; ADL Adaptive Strategies;Transfer Training;Equipment; Energy Conservation  Education Provided Comments: use of obtained ADL DME. home therapy benefits. importance of OOB activity more than just EOB>bathroom. Energy conservation while building endurance  Education Method: Demonstration  Education Outcome: Verbalized understanding;Continued education needed                        AM-PAC Score        AM-PAC Inpatient Daily Activity Raw Score: 18 (07/25/22 1549)  AM-PAC Inpatient ADL T-Scale Score : 38.66 (07/25/22 1549)  ADL Inpatient CMS 0-100% Score: 46.65 (07/25/22 1549)  ADL Inpatient CMS G-Code Modifier : CK (07/25/22 1549)    Tinneti Score       Goals  Short Term Goals  Time Frame for Short term goals: By d/c  Short Term Goal 1: Pt will complete LB dressing with min A using AE as needed  Short Term Goal 2: Pt will complete toileting with supervision  Short Term Goal 3: Pt will complete toilet transfers with SBA  Short Term Goal 4: Pt will increase standing tolerance to 5mins to complete sink level ADLs  Patient Goals   Patient goals : return home with family       Therapy Time   Individual Concurrent Group Co-treatment   Time In 1315         Time Out 1355         Minutes 40         Timed Code Treatment Minutes: 40 Minutes (25 TA.  15 ADL)       CANDY Boston, OTR/L

## 2022-07-25 NOTE — PROGRESS NOTES
Patient able to have small bowel movement. Tolerating ambulation with walker. Pain tolerable at this time. Will continue to monitor.      Electronically signed by Mari Steward RN on 7/25/2022 at 6:02 PM

## 2022-07-25 NOTE — PLAN OF CARE
Problem: Discharge Planning  Goal: Discharge to home or other facility with appropriate resources  Outcome: Progressing  Flowsheets  Taken 7/25/2022 0550  Discharge to home or other facility with appropriate resources:   Identify barriers to discharge with patient and caregiver   Identify discharge learning needs (meds, wound care, etc)  Taken 7/24/2022 2007  Discharge to home or other facility with appropriate resources:   Identify barriers to discharge with patient and caregiver   Arrange for needed discharge resources and transportation as appropriate     Problem: Pain  Goal: Verbalizes/displays adequate comfort level or baseline comfort level  Outcome: Progressing  Flowsheets (Taken 7/25/2022 0550)  Verbalizes/displays adequate comfort level or baseline comfort level:   Encourage patient to monitor pain and request assistance   Assess pain using appropriate pain scale     Problem: Safety - Adult  Goal: Free from fall injury  Outcome: Progressing  Flowsheets  Taken 7/25/2022 0550  Free From Fall Injury: Instruct family/caregiver on patient safety  Taken 7/25/2022 0536  Free From Fall Injury: Instruct family/caregiver on patient safety     Problem: ABCDS Injury Assessment  Goal: Absence of physical injury  Outcome: Progressing  Flowsheets  Taken 7/25/2022 0550  Absence of Physical Injury: Implement safety measures based on patient assessment  Taken 7/25/2022 0536  Absence of Physical Injury: Implement safety measures based on patient assessment     Problem: Skin/Tissue Integrity  Goal: Absence of new skin breakdown  Description: 1. Monitor for areas of redness and/or skin breakdown  2. Assess vascular access sites hourly  3. Every 4-6 hours minimum:  Change oxygen saturation probe site  4. Every 4-6 hours:  If on nasal continuous positive airway pressure, respiratory therapy assess nares and determine need for appliance change or resting period. Outcome: Progressing  Note: Skin assessment complete.  No new signs of skin breakdown noted. Repositioning patient with pillows at two hour intervals. Heels elevated off bed.

## 2022-07-25 NOTE — DISCHARGE INSTR - COC
Continuity of Care Form    Patient Name: Shlomo Peace   :  1955  MRN:  [id number]    Admit date:  2022  Discharge date:  22    Code Status Order: Full Code   Advance Directives:     Admitting Physician:  Penny Smith MD  PCP: Poli Baltazar MD    Discharging Nurse: JOSE ASTUDILLO [facility] Unit/Room#: F6Y-4868/3120-01  Discharging Unit Phone Number: [phone]    Emergency Contact:   Extended Emergency Contact Information  Primary Emergency Contact: Natalio Conteh  Address: [address], [place] [address] Phone: [phone]  Relation: Spouse    Past Surgical History:  Past Surgical History:   Procedure Laterality Date    APPENDECTOMY      PORT SURGERY N/A 2022    PORT A CATHETER PLACEMENT WITH FLUOROSCOPY performed by Karyna Burr MD at [address] [place] [address]         Immunization History:   Immunization History   Administered Date(s) Administered    COVID-19, MODERNA BLUE border, Primary or Immunocompromised, (age 12y+), IM, 100 mcg/0.5mL 2020, 2021, 2021    COVID-19, MODERNA Booster BLUE border, (age 18y+), IM, 50mcg/0.25mL 2021    Influenza Virus Vaccine 2019    Influenza, Rigoberto Churn, Recombinant, IM PF (Flublok 18 yrs and older) 10/16/2018    Pneumococcal Polysaccharide ([id number]) 2016       Active Problems:  Patient Active Problem List   Diagnosis Code    Unspecified malignant neoplasm of skin of upper limb, including shoulder C44.601    Seborrheic keratosis L82.1    Migraine G43.909    Osteopenia determined by x-ray M85.80    Abnormal mammogram R92.8    SCC (squamous cell carcinoma) C44.92    Tobacco abuse Z72.0    Mild intermittent asthma without complication I86.93    Breast implant status Z98.82    Osteopenia of multiple sites M85.89    Panlobular emphysema (HCC) J43.1    Kyphosis of thoracic region M40.204    Mass of right lower extremity R22.41    Benign neoplasm of soft tissue of leg, right D21.21    Metastatic non-small cell lung cancer (HCC) C34.90    Acute pulmonary embolism (HCC) I26.99       Isolation/Infection:   Isolation            No Isolation          Patient Infection Status       None to display            Nurse Assessment:  Last Vital Signs: /63   Pulse 80   Temp 98.1 °F (36.7 °C) (Oral)   Resp 18   Ht 5' 7\" (1.702 m)   Wt 161 lb 9.6 oz (73.3 kg)   SpO2 95%   BMI 25.31 kg/m²     Last documented pain score (0-10 scale): Pain Level: 0  Last Weight:   Wt Readings from Last 1 Encounters:   07/25/22 161 lb 9.6 oz (73.3 kg)     Mental Status:  oriented and alert    IV Access:  - None    Nursing Mobility/ADLs:  Walking   Assisted  Transfer  Assisted  Bathing  Assisted  Dressing  Assisted  Toileting  Assisted  Feeding  Independent  Med Admin  Independent  Med Delivery   whole    Wound Care Documentation and Therapy:  Incision 07/21/22 Chest Right (Active)   Dressing Status Clean;Dry; Intact 07/25/22 0754   Incision Cleansed Not Cleansed 07/24/22 2007   Dressing/Treatment Dry dressing 07/25/22 0754   Incision Assessment Dry 07/25/22 0754   Drainage Amount None 07/25/22 0754   Odor None 07/25/22 0754   [place]-incision Assessment Intact 07/25/22 0754   Number of days: 3        Elimination:  Continence: Bowel: yes  Bladder: Yes  Urinary Catheter: None   Colostomy/Ileostomy/Ileal Conduit: No       Date of Last BM: 7/27/22    Intake/Output Summary (Last 24 hours) at 7/25/2022 1238  Last data filed at 7/25/2022 1215  Gross per 24 hour   Intake 720 ml   Output 1300 ml   Net -580 ml     I/O last 3 completed shifts: In: 1080 [P.O.:1080]  Out: 1900 [Urine:1900]    Safety Concerns: At Risk for Falls    Impairments/Disabilities:      None    Nutrition Therapy:  Current Nutrition Therapy:   - Oral Diet:  General    Routes of Feeding: Oral  Liquids:  Thin Liquids  Daily Fluid Restriction: no  Last Modified Barium Swallow with Video (Video Swallowing Test): not done    Treatments at the Time of Hospital Discharge:   Respiratory Treatments: ***  Oxygen Therapy:  is not on home oxygen therapy. Ventilator:    - No ventilator support    Rehab Therapies: {THERAPEUTIC INTERVENTION:2399158085}  Weight Bearing Status/Restrictions: No weight bearing restrictions  Other Medical Equipment (for information only, NOT a DME order):  {EQUIPMENT:617382321}  Other Treatments: ***    Patient's personal belongings (please select all that are sent with patient):  None    RN SIGNATURE:  Electronically signed by Irasema Carrizales RN on 7/27/22 at 1:16 PM EDT    CASE MANAGEMENT/SOCIAL WORK SECTION    Inpatient Status Date: 7/19/22    Readmission Risk Assessment Score:  Readmission Risk              Risk of Unplanned Readmission:  [id number]       Discharging to Facility/ Agency   Name: [facility]  Address:  [address] [place] [address], [address] [place] [address]   Phone:  [phone]  Fax:  [phone]    / signature: Electronically signed by Bladimir Peres on 7/26/22 at 3:22 PM EDT    PHYSICIAN SECTION    Prognosis: Fair    Condition at Discharge: Stable    Rehab Potential (if transferring to Rehab): Fair    Recommended Labs or Other Treatments After Discharge: pt, ot, nurse    Physician Certification: I certify the above information and transfer of Jessi Wilks  is necessary for the continuing treatment of the diagnosis listed and that she requires Home Care for greater 30 days.      Update Admission H&P: No change in H&P    PHYSICIAN SIGNATURE:  Electronically signed by Iveth Donis DO on 7/25/22 at 12:38 PM EDT

## 2022-07-25 NOTE — PLAN OF CARE
Problem: Discharge Planning  Goal: Discharge to home or other facility with appropriate resources  7/25/2022 0744 by Nat Quan RN  Outcome: Progressing  Note: Assessed patients knowledge of discharge. Will continue to work with patient on discharge planning and answer patient questions. Will consult case management and  as necessary. Electronically signed by Nat Quan RN on 7/25/2022 at 7:44 AM      Problem: Pain  Goal: Verbalizes/displays adequate comfort level or baseline comfort level  7/25/2022 0744 by Nat Quan RN  Outcome: Progressing  Flowsheets (Taken 7/25/2022 0744)  Verbalizes/displays adequate comfort level or baseline comfort level:   Encourage patient to monitor pain and request assistance   Administer analgesics based on type and severity of pain and evaluate response   Assess pain using appropriate pain scale   Implement non-pharmacological measures as appropriate and evaluate response     Problem: Safety - Adult  Goal: Free from fall injury  7/25/2022 0744 by Nat Quan RN  Outcome: Progressing  Flowsheets (Taken 7/25/2022 0744)  Free From Fall Injury:   Dana Adler family/caregiver on patient safety   Based on caregiver fall risk screen, instruct family/caregiver to ask for assistance with transferring infant if caregiver noted to have fall risk factors     Problem: ABCDS Injury Assessment  Goal: Absence of physical injury  7/25/2022 0744 by Nat Quan RN  Outcome: Progressing  Flowsheets (Taken 7/25/2022 0744)  Absence of Physical Injury: Implement safety measures based on patient assessment     Problem: Skin/Tissue Integrity  Goal: Absence of new skin breakdown  Description: 1. Monitor for areas of redness and/or skin breakdown  2. Assess vascular access sites hourly  3. Every 4-6 hours minimum:  Change oxygen saturation probe site  4.   Every 4-6 hours:  If on nasal continuous positive airway pressure, respiratory therapy assess nares and determine need for appliance change or resting period.   7/25/2022 0744 by Ash Thompson RN  Outcome: Progressing

## 2022-07-25 NOTE — PROGRESS NOTES
Physical Therapy  Facility/Department: [address] ORTHOPEDICS  Physical Therapy Progress Note  This note serves as patient discharge summary if pt discharges prior to next PT visit    Name: David Garcia  : 1955  MRN: [id number]  Date of Service: 2022    Discharge Recommendations:  Continue to assess pending progress, Patient would benefit from continued therapy after discharge, Therapy recommended at discharge, Home with Home health PT, 24 hour supervision or assist    David Garcia scored a 18/24 on the AM-PAC short mobility form. Current research shows that an AM-PAC score of 18 or greater is typically associated with a discharge to the patient's home setting. Based on the patient's AM-PAC score and their current functional mobility deficits, it is recommended that the patient have 2-3 sessions per week of Physical Therapy at d/c to increase the patient's independence. At this time, this patient demonstrates the endurance and safety to discharge home with home therapy services and a follow up treatment frequency of 2-3x/wk. Please see assessment section for further patient specific details. Patient Diagnosis(es): The primary encounter diagnosis was Acute pulmonary embolism without acute cor pulmonale, unspecified pulmonary embolism type ([address] 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 Past Medical History:  has a past medical history of Headache. Past Surgical History:  has a past surgical history that includes Appendectomy; Tonsillectomy; and Port Surgery (N/A, 2022). Assessment   Body Structures, Functions, Activity Limitations Requiring Skilled Therapeutic Intervention: Decreased functional mobility ; Decreased ROM; Decreased body mechanics; Decreased strength;Decreased endurance; Increased pain  Assessment: Per H and P: [place] y.o. female with recent diagnosis of metastatic non small cell lung cancer otherwise no PMHx presented to [facility] with right upper quadrant abdominal pain. Sent in by her PCP. Active issues: acute pulmonary embolism with thromboembolism of IVC (heparin started), and Non-small cell lung cancer with mets. Oncology, general surgery, and cardiology consulted. P subclavian Port-A-Cath placed 7-21-22. Prior Status: Pt reports independence in home with ADLs, IADLs, bed mobility, transfers and gait. 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 Status 7/25/22: Pt is reporting RUQ abdomen pain 7/10 throughout session and R knee pain. Bed mobility: CGA, [place] sit<>supine to get R LE onto bed. Increased time, effortful, painful. Transfers: SBA, CGA to RW. Effortful, painful, mod flexed trunk. Gait: Pt amb with RW x 20' in room, then 100' down mustafa to rehab gym with SBA, CGA. Increased time with flexed trunk due to abdomen pain. Able to tolerate stair assessment x4 with SPC, SBA. Due to current fatigue, weakness and safety needs, reccomend pt DC to home with 24/7 assistance and home therapy services to increase functional mobility. Will continue to monitor and assess while here. Medical status and needs may drive DC plan. Therapy Prognosis: Good;Fair  History: as noted. Barriers to Learning: none  Requires PT Follow-Up: Yes  Activity Tolerance  Activity Tolerance: Patient tolerated evaluation without incident;Patient limited by fatigue;Patient limited by pain; Patient limited by endurance     Plan   Plan  Plan: 3-5 times per week  Current Treatment Recommendations: Strengthening, Functional mobility training, Transfer training, Endurance training, Gait training, Stair training, Neuromuscular re-education, Safety education & training, Patient/Caregiver education & training, Equipment evaluation, education, & procurement, Therapeutic activities  Safety Devices  Type of Devices: Bed alarm in place, Call light within reach, Gait belt, Patient at risk for falls, Left in bed     Restrictions  Restrictions/Precautions  Restrictions/Precautions: Fall Risk  Position Activity Restriction  Other position/activity restrictions: RUQ abdominal pain and R LE pain     Subjective   General  Chart Reviewed: Yes  Patient assessed for rehabilitation services?: Yes  Additional Pertinent Hx: Per H and P:  \"72 y.o. female with recent diagnosis of metastatic non small cell lung cancer otherwise no PMHx presented to [facility] with right upper quadrant abdominal pain. Sent in by her PCP. Active issues: acute pulmonary embolism with thromboembolism of IVC (heparin started), and Non-small cell lung cancer with mets. Oncology, general surgery, and cardiology consulted. Underwent R subclavian Port-A Cath placement. Response To Previous Treatment: Patient with no complaints from previous session. Family / Caregiver Present: No  Referring Practitioner: Bella Cockayne, MD  Referral Date : 07/20/22  Diagnosis: Acute pulmonary embolism without acute cor pulmonale, unspecified pulmonary embolism type ([address] Metastatic non-small cell lung cancer ([facility]  Follows Commands: Within Functional Limits  Subjective  Subjective: Pt in bed upon arrival, agreeable to therapy. Pain noted in abdomen and R LE.      Social/Functional History  Social/Functional History  Lives With: Spouse, Daughter  Type of Home: House  Home Layout: Two level, Bed/Bath upstairs, Able to Live on Main level with bedroom/bathroom (pt has been sleeping on 1st floor)  Home Access: Stairs to enter with rails  Entrance Stairs - Number of Steps: 4 MARCI  Entrance Stairs - Rails: Right  Bathroom Shower/Tub: Tub/Shower unit  Bathroom Toilet: Standard  Home Equipment: Hilario Kanaris  ADL Assistance: Independent  Homemaking Assistance: Independent  Ambulation Assistance: Independent (most recently using cane)  Transfer Assistance: Independent  Active : Yes  Occupation: Full time employment  Type of Occupation: RN from [employer]  Additional Comments: family purchased/obtained Tub transfer bench, bedside commode, and rolling walker    Cognition   Orientation  Overall Orientation Status: Within Functional Limits  Cognition  Overall Cognitive Status: WFL     Objective   Observation/Palpation  Body Mechanics: mild/moderate flexed trunk to avoid tightness and pain in abdomen in standing; rounded shoulders  AROM RLE (degrees)  RLE AROM: WFL  RLE General AROM: Limited due to pain  Strength RLE  Strength RLE: WFL  Strength LLE  Strength LLE: WFL  Balance  Sitting:  (SBA at EOB and yellow bedside chair.)  Standing:  (RW PRN tx and standing rest break)  Gait  Overall Level of Assistance: Contact-guard assistance;Minimum assistance (EOB>doorway>mustafa x100 feet>yellow beside chair>recliner. RW with min unsteadiness, increased time and leaned forward d/t abdominal pain)  Bed mobility  Supine to Sit: Contact guard assistance  Sit to Supine: Minimal assistance;Contact guard assistance (mild assist with RLE)  Bed Mobility Comments: increased time, effortful and painful  Transfers  Sit to Stand: Contact guard assistance;Stand by assistance (to RW. panful and effortful)  Stand to sit: Contact guard assistance;Stand by assistance  Ambulation  Surface: level tile  Device: Rolling Walker  Assistance: Stand by assistance;Contact guard assistance  Quality of Gait: Increased time, Moderately flexed trunk. Step through pattern. Cues to remain within RW base  Gait Deviations: Slow [place];Decreased step length;Decreased step height  Distance: 10', 10', 100', 5\"  Comments: Amb 10' to commode, 10' out fo room, 100' down mustafa to rehab gym, 5' from [facility] chair to bed.   Stairs/Curb  Stairs?: Yes  Stairs  # Steps : 4  Stairs Height: 6\"  Rails: Right ascending  Device: Single pt cane  Assistance: Contact guard assistance;Stand by assistance  Comment: increased time, effortful, painful, no LOB  Balance  Posture: Fair  Sitting - Static: Good  Sitting - Dynamic: Good  Standing - Static: Fair;+ (at RW)  Standing - Dynamic: Fair;+ (@ RW)  Comments: Moderately flexed trunk, due to upper abdominal pain. AM-PAC Score  AM-PAC Inpatient Mobility Raw Score : 18 (07/25/22 1624)  AM-PAC Inpatient T-Scale Score : 43.63 (07/25/22 1624)  Mobility Inpatient CMS 0-100% Score: 46.58 (07/25/22 1624)  Mobility Inpatient CMS G-Code Modifier : CK (07/25/22 1624)      Goals  Short Term Goals  Time Frame for Short term goals: By acute discharge. 7-25-22: all goals ongoing. Short term goal 1: Transfers with RW, SBA, no cues. Short term goal 2: Ambulate 48' with RW, no cues. Goal met 7/25/22, ongoing to 150'  Short term goal 3: Stair assessment as in home. goal met 7/25/22  Patient Goals   Patient goals : None stated, other than wish for pain relief.      Education  Patient Education  Education Given To: Patient  Education Provided: Role of Therapy;Plan of Care;Transfer Training;Equipment  Education Method: Verbal  Barriers to Learning: None  Education Outcome: Verbalized understanding;Demonstrated understanding    Therapy Time   Individual Concurrent Group Co-treatment   Time In 1540         Time Out 1615         Minutes 35         Timed Code Treatment Minutes: 35 Minutes   KEN 15, Gt Michelle Lynn 238   Electronically signed by Timothy Burch on 7/25/2022 at 4:26 PM  I attest that I was present for and made a skilled & mindful clinical judgement during the evaluation and/or treatment of this patient on 7/25/2022  Electronically signed by Chris Angelo[address] (#[phone])  on 7/25/2022 at 4:29 PM

## 2022-07-26 PROCEDURE — 97535 SELF CARE MNGMENT TRAINING: CPT

## 2022-07-26 PROCEDURE — 6370000000 HC RX 637 (ALT 250 FOR IP): Performed by: SURGERY

## 2022-07-26 PROCEDURE — 6370000000 HC RX 637 (ALT 250 FOR IP): Performed by: FAMILY MEDICINE

## 2022-07-26 PROCEDURE — 97530 THERAPEUTIC ACTIVITIES: CPT

## 2022-07-26 PROCEDURE — 2580000003 HC RX 258: Performed by: SURGERY

## 2022-07-26 PROCEDURE — 6370000000 HC RX 637 (ALT 250 FOR IP): Performed by: INTERNAL MEDICINE

## 2022-07-26 PROCEDURE — 1200000000 HC SEMI PRIVATE

## 2022-07-26 RX ORDER — MORPHINE SULFATE 15 MG/1
15 TABLET, FILM COATED, EXTENDED RELEASE ORAL 2 TIMES DAILY
Status: DISCONTINUED | OUTPATIENT
Start: 2022-07-26 | End: 2022-07-27 | Stop reason: HOSPADM

## 2022-07-26 RX ADMIN — APIXABAN 10 MG: 5 TABLET, FILM COATED ORAL at 08:15

## 2022-07-26 RX ADMIN — Medication 10.5 MG: at 22:05

## 2022-07-26 RX ADMIN — OXYCODONE AND ACETAMINOPHEN 2 TABLET: 5; 325 TABLET ORAL at 12:48

## 2022-07-26 RX ADMIN — MORPHINE SULFATE 15 MG: 15 TABLET, FILM COATED, EXTENDED RELEASE ORAL at 12:48

## 2022-07-26 RX ADMIN — OXYCODONE AND ACETAMINOPHEN 2 TABLET: 5; 325 TABLET ORAL at 08:15

## 2022-07-26 RX ADMIN — MAGNESIUM CITRATE 296 ML: 1.75 LIQUID ORAL at 13:42

## 2022-07-26 RX ADMIN — FOLIC ACID 1 MG: 1 TABLET ORAL at 08:15

## 2022-07-26 RX ADMIN — APIXABAN 10 MG: 5 TABLET, FILM COATED ORAL at 22:05

## 2022-07-26 RX ADMIN — SODIUM CHLORIDE, PRESERVATIVE FREE 10 ML: 5 INJECTION INTRAVENOUS at 22:10

## 2022-07-26 RX ADMIN — MORPHINE SULFATE 15 MG: 15 TABLET, FILM COATED, EXTENDED RELEASE ORAL at 22:05

## 2022-07-26 RX ADMIN — OXYCODONE AND ACETAMINOPHEN 2 TABLET: 5; 325 TABLET ORAL at 22:05

## 2022-07-26 RX ADMIN — OXYCODONE AND ACETAMINOPHEN 2 TABLET: 5; 325 TABLET ORAL at 02:16

## 2022-07-26 RX ADMIN — OXYCODONE AND ACETAMINOPHEN 2 TABLET: 5; 325 TABLET ORAL at 17:59

## 2022-07-26 RX ADMIN — NALOXEGOL OXALATE 12.5 MG: 12.5 TABLET, FILM COATED ORAL at 05:48

## 2022-07-26 RX ADMIN — POLYETHYLENE GLYCOL 3350 17 G: 17 POWDER, FOR SOLUTION ORAL at 08:15

## 2022-07-26 ASSESSMENT — PAIN DESCRIPTION - ORIENTATION
ORIENTATION: RIGHT

## 2022-07-26 ASSESSMENT — PAIN SCALES - GENERAL
PAINLEVEL_OUTOF10: 7
PAINLEVEL_OUTOF10: 7
PAINLEVEL_OUTOF10: 0
PAINLEVEL_OUTOF10: 8
PAINLEVEL_OUTOF10: 2
PAINLEVEL_OUTOF10: 0
PAINLEVEL_OUTOF10: 9
PAINLEVEL_OUTOF10: 0

## 2022-07-26 ASSESSMENT — PAIN DESCRIPTION - LOCATION
LOCATION: ABDOMEN

## 2022-07-26 ASSESSMENT — PAIN DESCRIPTION - ONSET
ONSET: ON-GOING
ONSET: ON-GOING

## 2022-07-26 ASSESSMENT — PAIN DESCRIPTION - DESCRIPTORS
DESCRIPTORS: ACHING

## 2022-07-26 ASSESSMENT — PAIN DESCRIPTION - PAIN TYPE
TYPE: ACUTE PAIN
TYPE: ACUTE PAIN

## 2022-07-26 ASSESSMENT — PAIN DESCRIPTION - FREQUENCY
FREQUENCY: CONTINUOUS
FREQUENCY: CONTINUOUS

## 2022-07-26 ASSESSMENT — PAIN - FUNCTIONAL ASSESSMENT
PAIN_FUNCTIONAL_ASSESSMENT: PREVENTS OR INTERFERES SOME ACTIVE ACTIVITIES AND ADLS

## 2022-07-26 NOTE — CARE COORDINATION
Spoke with patient about home care and provided list. She is agreeable to an agency in her insurance network. Patient reported her children have obtained all needed dme: walker, shower tub bench, wheelchair, RTS. Verified patient's address. Referral made to [facility]. Electronically signed by ISAIAH Singh LISW, Case Management on 7/26/2022 at 10:39 AM  [facility] [phone]    10:56 AM  Pending sale to [facility], [facility], [facility] are out of network. Checking with [facility].      Electronically signed by ISAIAH Singh LISW, Case Management on 7/26/2022 at 10:58 AM  [facility] [phone]

## 2022-07-26 NOTE — CARE COORDINATION
[facility]    Referral received from CM to follow for home care services. [facility] is OON with MEGHANN ADAMES,  will require alternate payor, no preference, CM aware. [facility] [place], [facility] will pull from [employer].       Alesia West RN, BSN CTN  [facility] [phone]

## 2022-07-26 NOTE — PLAN OF CARE
Problem: Discharge Planning  Goal: Discharge to home or other facility with appropriate resources  7/26/2022 0751 by Ciara Chilel RN  Outcome: Progressing Towards Goal  Flowsheets (Taken 7/26/2022 0006 by Kushal Lopez RN)  Discharge to home or other facility with appropriate resources: Identify discharge learning needs (meds, wound care, etc)  7/26/2022 0006 by Kushal Lopez RN  Outcome: Progressing Towards Goal  Flowsheets (Taken 7/26/2022 0006)  Discharge to home or other facility with appropriate resources: Identify discharge learning needs (meds, wound care, etc)     Problem: Pain  Goal: Verbalizes/displays adequate comfort level or baseline comfort level  7/26/2022 0751 by Ciara Chilel RN  Outcome: Progressing Towards Goal  Flowsheets (Taken 7/26/2022 0006 by Kushal Lopez RN)  Verbalizes/displays adequate comfort level or baseline comfort level:   Assess pain using appropriate pain scale   Encourage patient to monitor pain and request assistance  7/26/2022 0006 by Kushal Lopez RN  Outcome: Progressing Towards Goal  Flowsheets (Taken 7/26/2022 0006)  Verbalizes/displays adequate comfort level or baseline comfort level:   Assess pain using appropriate pain scale   Encourage patient to monitor pain and request assistance     Problem: Safety - Adult  Goal: Free from fall injury  7/26/2022 0751 by Ciara Chilel RN  Outcome: Progressing Towards Goal  Flowsheets (Taken 7/26/2022 0006 by Kushal Lopez RN)  Free From Fall Injury: Instruct family/caregiver on patient safety  7/26/2022 0006 by Kushal Lopez RN  Outcome: Progressing Towards Goal  Flowsheets (Taken 7/26/2022 0006)  Free From Fall Injury: Instruct family/caregiver on patient safety     Problem: ABCDS Injury Assessment  Goal: Absence of physical injury  7/26/2022 0751 by Ciara Chilel RN  Outcome: Progressing Towards Goal  Flowsheets (Taken 7/26/2022 0006 by Kushal Lopez RN)  Absence of Physical Injury: Implement safety measures based on patient assessment  7/26/2022 0006 by Helene Alvarez RN  Outcome: Progressing Towards Goal  Flowsheets (Taken 7/26/2022 0006)  Absence of Physical Injury: Implement safety measures based on patient assessment     Problem: Skin/Tissue Integrity  Goal: Absence of new skin breakdown  Description: 1. Monitor for areas of redness and/or skin breakdown  2. Assess vascular access sites hourly  3. Every 4-6 hours minimum:  Change oxygen saturation probe site  4. Every 4-6 hours:  If on nasal continuous positive airway pressure, respiratory therapy assess nares and determine need for appliance change or resting period. 7/26/2022 0751 by Dennis Fernando RN  Outcome: Progressing Towards Goal  Note: Mark score assessed. Patient able to ambulate and turn self and repositioned patient Q2H and assessed skin. Educated patient on importance of repositioning to prevent skin issues.      7/26/2022 0006 by Helene Alvarez RN  Outcome: Progressing Towards Goal     Problem: Musculoskeletal - Adult  Goal: Return ADL status to a safe level of function  7/26/2022 0751 by Dennis Fernando RN  Outcome: Progressing Towards Goal  Flowsheets (Taken 7/26/2022 0006 by Helene Alvarez RN)  Return ADL Status to a Safe Level of Function: Administer medication as ordered  7/26/2022 0006 by Helene Alvarez RN  Outcome: Progressing Towards Goal  Flowsheets (Taken 7/26/2022 0006)  Return ADL Status to a Safe Level of Function: Administer medication as ordered

## 2022-07-26 NOTE — PROGRESS NOTES
Comprehensive Nutrition Assessment    Type and Reason for Visit:  Initial    Nutrition Recommendations/Plan:   Continue current diet  Begin Ensure Enlive TID     Malnutrition Assessment:  Malnutrition Status: At risk for malnutrition (Comment) (07/26/22 1318)    Context:  Acute Illness       Nutrition Assessment:    LOS. Pt with metastatic SCLC, acute PE and IVC thrombosis. Pt to begin palliative radiation today. No wt loss per EMR. On regular diet with intakes <50% at most meals. Will trial Ensure Enlive TID and monitor for acceptance. Nutrition Related Findings:    +BM 7/25. +2 nonpitting RUE edema. +1 pitting LLE edema. Wound Type: Surgical Incision       Current Nutrition Intake & Therapies:    Average Meal Intake: 0%, 1-25%, 26-50%  Average Supplements Intake: None Ordered  ADULT DIET; Regular    Anthropometric Measures:  Height: 5' 7\" (170.2 cm)  Ideal Body Weight (IBW): 135 lbs (61 kg)    Admission Body Weight: 152 lb (68.9 kg)  Current Body Weight: 157 lb (71.2 kg), 116.3 % IBW. Weight Source: Standing Scale  Current BMI (kg/m2): 24.6  Weight Adjustment For: No Adjustment  BMI Categories: Normal Weight (BMI 18.5-24. 9)    Estimated Daily Nutrient Needs:  Energy Requirements Based On: Kcal/kg  Weight Used for Energy Requirements: Current  Energy (kcal/day): 6823-7977 (25-30kcal/71kg)  Weight Used for Protein Requirements: Current  Protein (g/day): 85-99 (1.2-1.4g/71kg)  Method Used for Fluid Requirements: 1 ml/kcal  Fluid (ml/day): 1 ml/kcal    Nutrition Diagnosis:   Inadequate oral intake related to inadequate protein-energy intake as evidenced by intake 0-25%, intake 26-50%    Nutrition Interventions:   Food and/or Nutrient Delivery: Continue Current Diet, Start Oral Nutrition Supplement  Nutrition Education/Counseling: Education not indicated  Coordination of Nutrition Care: Continue to monitor while inpatient     Goals:  Goals: PO intake 50% or greater    Nutrition Monitoring and Evaluation: Behavioral-Environmental Outcomes: None Identified  Food/Nutrient Intake Outcomes: Food and Nutrient Intake, Supplement Intake  Physical Signs/Symptoms Outcomes: Biochemical Data, Skin, Weight, Fluid Status or Edema    Discharge Planning:     Too soon to determine     Paty Rios, [address] [place] [address],   Contact: [phone]

## 2022-07-26 NOTE — PROGRESS NOTES
Hospitalist Progress Note    Patient:  David Garcia  Unit/Bed:R3F-9153/3120-01   YOB: 1955       MRN: [id number] Acct: [de-identified]  PCP: Jero Mart MD    Date of Admission: 7/19/2022  --------------------------    Chief Complaint:     Severe right upper quadrant abdominal pain. Hospital Course:     David Garcia is a 74 y.o. 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      Code Status: Full Code         DVT prophylaxis: Anticoagulated with Eliquis     Disposition: Patient will receive radiation treatment today, continue bowel regimen, hopefully discharge home tomorrow. I discussed my thought processes at length with patient/family and patient understood. Question and concerns  Addressed      Discussed with RN      ----------------      Subjective:     Patient seen and examined  Overnight events noted  RN and ancillary staff note reviewed       Passing out has small bowel movement, still have pain at the right upper quadrant area,    Diet: ADULT DIET; Regular  ADULT ORAL NUTRITION SUPPLEMENT; Breakfast, Lunch, Dinner; Standard High Calorie/High Protein Oral Supplement    OBJECTIVE     Exam:  /74   Pulse 80   Temp 97.6 °F (36.4 °C) (Oral)   Resp 16   Ht 5' 7\" (1.702 m)   Wt 157 lb 3 oz (71.3 kg)   SpO2 96%   BMI 24.62 kg/m²            Gen: Not in distress. Alert.   Head: Normocephalic. Atraumatic. Eyes: Conjunctivae/corneas clear. ENT: Oral mucosa moist  Neck: No JVD. No obvious thyromegaly. CVS: Nml S1S2, no murmur  , RRR  Pulmomary: Diminished air entry   gastrointestinal: Soft, tenderness in the right upper quadrant area, no rebound tenderness, non distend, . Musculoskeletal: No edema. Warm  Neuro: No focal deficit. Moves extremity spontaneously. Psychiatry: Appropriate affect. Not agitated. Medications:  Reviewed    Infusion Medications    sodium chloride       Scheduled Medications    morphine  15 mg Oral BID    naloxegol  12.5 mg Oral QAM AC    polyethylene glycol  17 g Oral Daily    lidocaine  1 patch TransDERmal Daily    apixaban  10 mg Oral BID    folic acid  1 mg Oral Daily    nicotine  1 patch TransDERmal Q24H    melatonin  10.5 mg Oral Nightly    sodium chloride flush  5-40 mL IntraVENous 2 times per day     PRN Meds: sennosides-docusate sodium, sodium chloride flush, sodium chloride, ondansetron **OR** ondansetron, acetaminophen **OR** acetaminophen, perflutren lipid microspheres, morphine **OR** morphine, oxyCODONE-acetaminophen **OR** oxyCODONE-acetaminophen      Intake/Output Summary (Last 24 hours) at 7/26/2022 1500  Last data filed at 7/26/2022 0704  Gross per 24 hour   Intake 940 ml   Output 700 ml   Net 240 ml             Labs:   Recent Labs     07/24/22  0543   WBC 8.6   HGB 11.2*   HCT 33.9*        Recent Labs     07/24/22  0543   *   K 4.2   CL 94*   CO2 25   BUN 18   CREATININE <0.5*   CALCIUM 8.9     Recent Labs     07/24/22  0543   *   ALT 49*   BILITOT 0.8   ALKPHOS 781*     No results for input(s): INR in the last 72 hours. No results for input(s): Kaleen Hope in the last 72 hours.     Urinalysis:      Lab Results   Component Value Date/Time    NITRU POSITIVE 11/07/2019 04:45 PM    WBCUA  11/07/2019 04:45 PM    BACTERIA Rare 11/07/2019 04:45 PM    RBCUA 20-50 11/07/2019 04:45 PM    BLOODU LARGE 11/07/2019 04:45 PM    SPECGRAV 1.020 11/07/2019 04:45 PM    GLUCOSEU 100 11/07/2019 04:45 PM       Radiology:  XR CHEST PORTABLE   Final Result      1. Right-sided chest port has been placed via the right subclavian vein   since the prior study with the tip in the upper SVC. No evidence of   pneumothorax. 2.  Presumed mass within the left mid lung field is unchanged. 3.  Some opacity in left lower lung field is likely related to a suboptimal   inspiration and otherwise the lungs appear unchanged. FL VASCULAR ACCESS GUIDANCE   Final Result      MRI BRAIN W WO CONTRAST   Final Result   No brain metastases identified. Possible right maxillary sinus mucocele. CT CHEST ABDOMEN PELVIS W CONTRAST   Final Result   1. Large left upper lobe mass abutting the fissure and extending to the hilum   measuring 6.0 x 4.2 cm is consistent with known malignancy. 2. Large filling defect in the right interlobar pulmonary artery consistent   with acute pulmonary embolism and there may be smaller filling defects in   branch vessels. No definite right heart strain. 3. Moderate emphysema. A nodule in the medial right lower lobe measuring 1.0   x 0.8 cm could also be malignant and there are additional scattered tiny   nodules. 4. Diffuse hepatic metastatic disease. 5. Tumor thrombus extends into the intrahepatic IVC and common iliac veins   with possible extension into the external iliac veins but no definite   extension into the renal veins. 6. Prominent retrocrural, right upper quadrant, and retroperitoneal lymph   nodes also suspicious for metastatic disease. Findings were given to JAMEY Gudino in the ED on 7/19/2022 at 5:38 p.m.                      Electronically signed by Poncho Bustos MD on 7/26/2022 at 3:00 PM

## 2022-07-26 NOTE — PROGRESS NOTES
Occupational Therapy  Facility/Department: [facility]  Occupational Daily Treatment Note      Name: Anthony Lu  : 1955  MRN: [id number]  Date of Service: 2022    Discharge Recommendations:  24 hour supervision or assist, S Level 3, Home with Home health OT  OT Equipment Recommendations  ADL Assistive Devices: Toileting - 3-in-1 Commode;Transfer Tub Bench; Toileting - Raised Toilet Seat with arms; Reacher       Patient Diagnosis(es): The primary encounter diagnosis was Acute pulmonary embolism without acute cor pulmonale, unspecified pulmonary embolism type ([facility] Diagnoses of Acute thromboembolism of inferior vena cava (HCC), Non-small cell lung cancer with metastasis ([facility] Pain in both lower extremities, and Metastatic non-small cell lung cancer ([facility] were also pertinent to this visit. Past Medical History:  has a past medical history of Headache. Past Surgical History:  has a past surgical history that includes Appendectomy; Tonsillectomy; and Port Surgery (N/A, 2022). Anthony Lu scored a 18/24 on the AM-PAC ADL Inpatient form. Current research shows that an AM-PAC score of 18 or greater is typically associated with a discharge to the patient's home setting. Based on the patient's AM-PAC score, and their current ADL deficits, it is recommended that the patient have 2-3 sessions per week of Occupational Therapy at d/c to increase the patient's independence. At this time, this patient demonstrates the endurance and safety to discharge home with home (home vs OP services) and a follow up treatment frequency of 2-3x/wk. Please see assessment section for further patient specific details. If patient discharges prior to next session this note will serve as a discharge summary. Please see below for the latest assessment towards goals.    HOME HEALTH CARE: LEVEL 3 SAFETY       -Initial home health evaluation to occur within 24-48 hours, in patient home   -Home health agency to establish plan of care for patient over 60 day period   -Medication Reconciliation   -PT/OT/Speech evaluations in home within 24-48 hours of discharge; including  -DME and home safety   -Frontload therapy 5 days, then 3x a week   -OT to evaluate if patient has [address] needs for personal care   - evaluation within 24-48 hours, includes evaluation of resources   and insurance to determine [place], [place], LTC, and Medicaid options   -PCP Visit scheduled within three to seven days of discharge       Assessment   Performance deficits / Impairments: Decreased functional mobility ; Decreased endurance;Decreased ADL status; Decreased balance;Decreased posture;Decreased strength;Decreased high-level IADLs  Assessment: Discussed with OTR am pac score is 18. Anticipate that patient will be able to return home with 24/7 assist/supervision and home OT to faciliate a safe discharge to home. Patient completed supine to sit with SBA with HOB elevated. SBA/CGA for sit<>stand from EOB to RW to recliner chair with cues for hand placement and safety. Functional mobility with RW with SBA/CGA, slow steady gait with no overt LOB noted. Patient is making good progress towards goals. Cont with POC.   REQUIRES OT FOLLOW-UP: Yes  Activity Tolerance  Activity Tolerance: Patient Tolerated treatment well        Plan   Plan  Times per Week: 3-5x/wk  Current Treatment Recommendations: Strengthening, Balance training, Self-Care / ADL, Functional mobility training, Safety education & training, Endurance training, Patient/Caregiver education & training, Coordination training     Restrictions  Restrictions/Precautions  Restrictions/Precautions: Fall Risk  Position Activity Restriction  Other position/activity restrictions: RUQ abdominal pain and R LE pain    Subjective   General  Chart Reviewed: Yes  Patient assessed for rehabilitation services?: Yes  Additional Pertinent Hx: Shaina Kwan Is a 66-year-old woman with what appears to be metastatic adenocarcinoma likely from a left upper lobe lung primary with metastatic involvement of liver, soft tissue of the right lower leg, and numerous lymph node sites. Response to previous treatment: Patient with no complaints from previous session  Family / Caregiver Present: Yes (spouse)  Referring Practitioner: Ace Zaragoza MD  Diagnosis: RUE quadrant pain;  Subjective  Subjective: Patient supine in bed upon arrival to room. Patient agreeable to therapy. Reports painin RUQ, does not rate with a number  General Comment  Comments: RN okay for therapy     Social/Functional History  Social/Functional History  Lives With: Spouse, Daughter  Type of Home: House  Home Layout: Two level, Bed/Bath upstairs, Able to Live on Main level with bedroom/bathroom (pt has been sleeping on 1st floor)  Home Access: Stairs to enter with rails  Entrance Stairs - Number of Steps: 4 MARCI  Entrance Stairs - Rails: Right  Bathroom Shower/Tub: Tub/Shower unit  Bathroom Toilet: Standard  Home Equipment: ubitus  ADL Assistance: Independent  Homemaking Assistance: Independent  Ambulation Assistance: Independent (most recently using cane)  Transfer Assistance: Independent  Active : Yes  Occupation: Full time employment  Type of Occupation: RN from [employer]  Additional Comments: family purchased/obtained Tub transfer bench, bedside commode, and rolling walker       Objective      Safety Devices  Type of Devices: Call light within reach; Left in chair;Nurse notified;Gait belt  Balance  Sitting: Intact  Standing: With support (close SBA with RW)        ADL  Grooming: Setup  Grooming Skilled Clinical Factors: seated in recliner chair to wash face and hands        Bed mobility  Supine to Sit: Stand by assistance (HOB elevated and side rail)  Sit to Supine: Unable to assess (up in chair at end of session)  Transfers  Sit to stand: Stand by assistance;Contact guard assistance  Stand to sit: Stand by assistance;Contact guard assistance  Transfer Comments: SBA/CGA for sit<>stand from EOB to RW to recliner chair with mild cues for hand placement. Functional mobility with RW with SBA/CGA, slow steady gait with no overt LOB noted          Education Given To: Patient; Family  Education Provided: Role of Therapy;Plan of Care;Precautions; ADL Adaptive Strategies;Transfer Training;Equipment; Energy Conservation                   AM-PAC Score        AM-PAC Inpatient Daily Activity Raw Score: 18 (07/26/22 1118)  AM-PAC Inpatient ADL T-Scale Score : 38.66 (07/26/22 1118)  ADL Inpatient CMS 0-100% Score: 46.65 (07/26/22 1118)  ADL Inpatient CMS G-Code Modifier : CK (07/26/22 1118)           Goals  Short Term Goals  Time Frame for Short term goals: By d/c: all goals ongoing  Short Term Goal 1: Pt will complete LB dressing with min A using AE as needed  Short Term Goal 2: Pt will complete toileting with supervision  Short Term Goal 3: Pt will complete toilet transfers with SBA  Short Term Goal 4: Pt will increase standing tolerance to 5mins to complete sink level ADLs  Patient Goals   Patient goals : return home with family       Therapy Time   Individual Concurrent Group Co-treatment   Time In [address]         Time Out 1125         Minutes 40                 Electronically signed by Rena Flores, [id number] on 7/26/2022 at 11:28 AM

## 2022-07-26 NOTE — CARE COORDINATION
07/26/22 1039   IMM Letter   IMM Letter given to Patient/Family/Significant other/Guardian/POA/by: second letter given to patient  hs   IMM Letter date given: 07/26/22   IMM Letter time given: 1000

## 2022-07-26 NOTE — PROGRESS NOTES
Hematology Oncology Daily Progress Note    Admit Date: 7/19/2022  Hospital day several    Subjective:     Patient has complaints of ongoing abdominal pain--6/10--denies sob/cp. .   Medication side effects: none    Scheduled Meds:   morphine  15 mg Oral BID    naloxegol  12.5 mg Oral QAM AC    polyethylene glycol  17 g Oral Daily    lidocaine  1 patch TransDERmal Daily    apixaban  10 mg Oral BID    folic acid  1 mg Oral Daily    nicotine  1 patch TransDERmal Q24H    melatonin  10.5 mg Oral Nightly    sodium chloride flush  5-40 mL IntraVENous 2 times per day     Continuous Infusions:   sodium chloride       PRN Meds:sennosides-docusate sodium, sodium chloride flush, sodium chloride, ondansetron **OR** ondansetron, acetaminophen **OR** acetaminophen, perflutren lipid microspheres, morphine **OR** morphine, oxyCODONE-acetaminophen **OR** oxyCODONE-acetaminophen    Review of Systems  Pertinent items are noted in HPI. REVIEW OF SYSTEMS:         Constitutional: Denies fever, sweats, weight loss     Eyes: No visual changes or diplopia. No scleral icterus. ENT: No Headaches, hearing loss or vertigo. No mouth sores or sore throat. Cardiovascular: No chest pain, dyspnea on exertion, palpitations or loss of consciousness. Respiratory: No cough or wheezing, no sputum production. No hemoptysis. .    Gastrointestinal: No abdominal pain, appetite loss, blood in stools. No change in bowel habits. Genitourinary: No dysuria, trouble voiding, or hematuria. Musculoskeletal:  Generalized weakness. No joint complaints. Integumentary: No rash or pruritis. Neurological: No headache, diplopia. No change in gait, balance, or coordination. No paresthesias. Endocrine: No temperature intolerance. No excessive thirst, fluid intake, or urination. Hematologic/Lymphatic: No abnormal bruising or ecchymoses, blood clots or swollen lymph nodes. Allergic/Immunologic: No nasal congestion or hives.        Objective:     Patient Vitals for the cancer [facility])  Resolved Problems:    * No resolved hospital problems. *      Plan:     Stage 4 NSCLC--s/p carbo/alimta. Starting palliative radiation today. 2.   Pain control--start mscontin.         Electronically signed by Otto Granados MD on 7/26/2022 at 12:46 PM

## 2022-07-26 NOTE — PLAN OF CARE
Problem: Discharge Planning  Goal: Discharge to home or other facility with appropriate resources  Outcome: Progressing Towards Goal  Flowsheets (Taken 7/26/2022 0006)  Discharge to home or other facility with appropriate resources: Identify discharge learning needs (meds, wound care, etc)     Problem: Pain  Goal: Verbalizes/displays adequate comfort level or baseline comfort level  Outcome: Progressing Towards Goal  Flowsheets (Taken 7/26/2022 0006)  Verbalizes/displays adequate comfort level or baseline comfort level:   Assess pain using appropriate pain scale   Encourage patient to monitor pain and request assistance     Problem: Safety - Adult  Goal: Free from fall injury  Outcome: Progressing Towards Goal  Flowsheets (Taken 7/26/2022 0006)  Free From Fall Injury: Instruct family/caregiver on patient safety     Problem: ABCDS Injury Assessment  Goal: Absence of physical injury  Outcome: Progressing Towards Goal  Flowsheets (Taken 7/26/2022 0006)  Absence of Physical Injury: Implement safety measures based on patient assessment     Problem: Skin/Tissue Integrity  Goal: Absence of new skin breakdown  Description: 1. Monitor for areas of redness and/or skin breakdown  2. Assess vascular access sites hourly  3. Every 4-6 hours minimum:  Change oxygen saturation probe site  4. Every 4-6 hours:  If on nasal continuous positive airway pressure, respiratory therapy assess nares and determine need for appliance change or resting period.   Outcome: Progressing Towards Goal     Problem: Musculoskeletal - Adult  Goal: Return ADL status to a safe level of function  Outcome: Progressing Towards Goal  Flowsheets (Taken 7/26/2022 0006)  Return ADL Status to a Safe Level of Function: Administer medication as ordered

## 2022-07-27 VITALS
OXYGEN SATURATION: 95 % | RESPIRATION RATE: 16 BRPM | TEMPERATURE: 98.1 F | HEIGHT: 67 IN | DIASTOLIC BLOOD PRESSURE: 69 MMHG | SYSTOLIC BLOOD PRESSURE: 117 MMHG | WEIGHT: 157.19 LBS | BODY MASS INDEX: 24.67 KG/M2 | HEART RATE: 79 BPM

## 2022-07-27 PROCEDURE — 94760 N-INVAS EAR/PLS OXIMETRY 1: CPT

## 2022-07-27 PROCEDURE — 6360000002 HC RX W HCPCS: Performed by: SURGERY

## 2022-07-27 PROCEDURE — 2580000003 HC RX 258: Performed by: SURGERY

## 2022-07-27 PROCEDURE — 6370000000 HC RX 637 (ALT 250 FOR IP): Performed by: SURGERY

## 2022-07-27 PROCEDURE — 6370000000 HC RX 637 (ALT 250 FOR IP): Performed by: INTERNAL MEDICINE

## 2022-07-27 PROCEDURE — 6370000000 HC RX 637 (ALT 250 FOR IP): Performed by: FAMILY MEDICINE

## 2022-07-27 RX ORDER — MORPHINE SULFATE 15 MG/1
15 TABLET, FILM COATED, EXTENDED RELEASE ORAL 2 TIMES DAILY
Refills: 0 | Status: ON HOLD | COMMUNITY
Start: 2022-07-27 | End: 2022-08-16 | Stop reason: HOSPADM

## 2022-07-27 RX ORDER — SENNA AND DOCUSATE SODIUM 50; 8.6 MG/1; MG/1
2 TABLET, FILM COATED ORAL DAILY PRN
Qty: 30 TABLET | Refills: 1 | Status: SHIPPED | OUTPATIENT
Start: 2022-07-27 | End: 2022-08-12

## 2022-07-27 RX ADMIN — OXYCODONE AND ACETAMINOPHEN 2 TABLET: 5; 325 TABLET ORAL at 17:02

## 2022-07-27 RX ADMIN — OXYCODONE AND ACETAMINOPHEN 2 TABLET: 5; 325 TABLET ORAL at 04:00

## 2022-07-27 RX ADMIN — SODIUM CHLORIDE, PRESERVATIVE FREE 10 ML: 5 INJECTION INTRAVENOUS at 08:47

## 2022-07-27 RX ADMIN — ONDANSETRON 4 MG: 2 INJECTION INTRAMUSCULAR; INTRAVENOUS at 04:00

## 2022-07-27 RX ADMIN — OXYCODONE AND ACETAMINOPHEN 2 TABLET: 5; 325 TABLET ORAL at 11:58

## 2022-07-27 RX ADMIN — POLYETHYLENE GLYCOL 3350 17 G: 17 POWDER, FOR SOLUTION ORAL at 08:47

## 2022-07-27 RX ADMIN — MORPHINE SULFATE 15 MG: 15 TABLET, FILM COATED, EXTENDED RELEASE ORAL at 08:46

## 2022-07-27 RX ADMIN — APIXABAN 10 MG: 5 TABLET, FILM COATED ORAL at 08:47

## 2022-07-27 RX ADMIN — FOLIC ACID 1 MG: 1 TABLET ORAL at 08:47

## 2022-07-27 RX ADMIN — NALOXEGOL OXALATE 12.5 MG: 12.5 TABLET, FILM COATED ORAL at 06:34

## 2022-07-27 ASSESSMENT — PAIN - FUNCTIONAL ASSESSMENT
PAIN_FUNCTIONAL_ASSESSMENT: ACTIVITIES ARE NOT PREVENTED
PAIN_FUNCTIONAL_ASSESSMENT: PREVENTS OR INTERFERES SOME ACTIVE ACTIVITIES AND ADLS
PAIN_FUNCTIONAL_ASSESSMENT: ACTIVITIES ARE NOT PREVENTED

## 2022-07-27 ASSESSMENT — PAIN DESCRIPTION - FREQUENCY
FREQUENCY: CONTINUOUS
FREQUENCY: CONTINUOUS

## 2022-07-27 ASSESSMENT — PAIN DESCRIPTION - ONSET
ONSET: ON-GOING
ONSET: ON-GOING

## 2022-07-27 ASSESSMENT — PAIN SCALES - GENERAL
PAINLEVEL_OUTOF10: 7
PAINLEVEL_OUTOF10: 8

## 2022-07-27 ASSESSMENT — PAIN DESCRIPTION - PAIN TYPE
TYPE: ACUTE PAIN
TYPE: ACUTE PAIN

## 2022-07-27 ASSESSMENT — PAIN DESCRIPTION - DESCRIPTORS
DESCRIPTORS: ACHING

## 2022-07-27 ASSESSMENT — PAIN DESCRIPTION - LOCATION
LOCATION: ABDOMEN

## 2022-07-27 ASSESSMENT — PAIN DESCRIPTION - ORIENTATION
ORIENTATION: RIGHT
ORIENTATION: UPPER;RIGHT
ORIENTATION: RIGHT;UPPER

## 2022-07-27 NOTE — PROGRESS NOTES
Pt resting in bed, up x1 with a walker. A&Ox4. Pt tolerated regular diet and PO medications whole with thin liquids. C/o pain, see MAR for medication intervention. Pt felt constipated this AM, was able to have a BM. All needs addressed at this time. Call light within reach. Fall precautions in place.  Electronically signed by Pradip Lawton RN on 7/27/2022 at 12:47 PM

## 2022-07-27 NOTE — PROGRESS NOTES
Hematology Oncology Daily Progress Note    Admit Date: 7/19/2022  Hospital day several    Subjective:     Patient has complaints of improved abdominal pain--denies sob/cp. Medication side effects: none    Scheduled Meds:   morphine  15 mg Oral BID    naloxegol  12.5 mg Oral QAM AC    polyethylene glycol  17 g Oral Daily    lidocaine  1 patch TransDERmal Daily    apixaban  10 mg Oral BID    folic acid  1 mg Oral Daily    nicotine  1 patch TransDERmal Q24H    melatonin  10.5 mg Oral Nightly    sodium chloride flush  5-40 mL IntraVENous 2 times per day     Continuous Infusions:   sodium chloride       PRN Meds:sennosides-docusate sodium, sodium chloride flush, sodium chloride, ondansetron **OR** ondansetron, acetaminophen **OR** acetaminophen, perflutren lipid microspheres, morphine **OR** morphine, oxyCODONE-acetaminophen **OR** oxyCODONE-acetaminophen    Review of Systems  Pertinent items are noted in HPI. REVIEW OF SYSTEMS:         Constitutional: Denies fever, sweats, weight loss     Eyes: No visual changes or diplopia. No scleral icterus. ENT: No Headaches, hearing loss or vertigo. No mouth sores or sore throat. Cardiovascular: No chest pain, dyspnea on exertion, palpitations or loss of consciousness. Respiratory: No cough or wheezing, no sputum production. No hemoptysis. .    Gastrointestinal: No abdominal pain, appetite loss, blood in stools. No change in bowel habits. Genitourinary: No dysuria, trouble voiding, or hematuria. Musculoskeletal:  Generalized weakness. No joint complaints. Integumentary: No rash or pruritis. Neurological: No headache, diplopia. No change in gait, balance, or coordination. No paresthesias. Endocrine: No temperature intolerance. No excessive thirst, fluid intake, or urination. Hematologic/Lymphatic: No abnormal bruising or ecchymoses, blood clots or swollen lymph nodes. Allergic/Immunologic: No nasal congestion or hives.        Objective:     Patient Vitals for the past 8 hrs:   BP Temp Temp src Pulse Resp SpO2   07/27/22 0741 117/69 98.1 °F (36.7 °C) Oral 79 20 96 %     I/O last 3 completed shifts: In: 1300 [P.O.:1300]  Out: 700 [Urine:700]  No intake/output data recorded. /69   Pulse 79   Temp 98.1 °F (36.7 °C) (Oral)   Resp 20   Ht 5' 7\" (1.702 m)   Wt 157 lb 3 oz (71.3 kg)   SpO2 96%   BMI 24.62 kg/m²     General Appearance:    Alert, cooperative, no distress, appears stated age   Head:    Normocephalic, without obvious abnormality, atraumatic   Eyes:    PERRL, conjunctiva/corneas clear, EOM's intact, fundi     benign, both eyes        Ears:    Normal TM's and external ear canals, both ears   Nose:   Nares normal, septum midline, mucosa normal, no drainage    or sinus tenderness   Throat:   Lips, mucosa, and tongue normal; teeth and gums normal   Neck:   Supple, symmetrical, trachea midline, no adenopathy;        thyroid:  No enlargement/tenderness/nodules; no carotid    bruit or JVD   Back:     Symmetric, no curvature, ROM normal, no CVA tenderness   Lungs:     Clear to auscultation bilaterally, respirations unlabored   Chest wall:    No tenderness or deformity   Heart:    Regular rate and rhythm, S1 and S2 normal, no murmur, rub   or gallop   Abdomen:     Soft, non-tender, bowel sounds active all four quadrants,     no masses, no organomegaly           Extremities:   Extremities normal, atraumatic, no cyanosis or edema   Pulses:   2+ and symmetric all extremities   Skin:   Skin color, texture, turgor normal, no rashes or lesions   Lymph nodes:   Cervical, supraclavicular, and axillary nodes normal   Neurologic:   CNII-XII intact.  Normal strength, sensation and reflexes       throughout       Data ReviewCBC:   Lab Results   Component Value Date/Time    WBC 8.6 07/24/2022 05:43 AM    RBC 3.84 07/24/2022 05:43 AM       Assessment:     Principal Problem:    Acute pulmonary embolism (HCC)  Active Problems:    Metastatic non-small cell lung cancer ([address]  Resolved Problems:    * No resolved hospital problems. *      Plan:     Stage 4 NSCLC. S/P Carbo/Alimta last week. Continue palliative radiation to leg. 2.   Pain control--pain better with mscontin    OK to discharge.         Electronically signed by Alonzo Don MD on 7/27/2022 at 8:08 AM

## 2022-07-27 NOTE — PLAN OF CARE
Problem: Discharge Planning  Goal: Discharge to home or other facility with appropriate resources  Outcome: Progressing Towards Goal  Flowsheets (Taken 7/27/2022 0803)  Discharge to home or other facility with appropriate resources: Identify barriers to discharge with patient and caregiver     Problem: Pain  Goal: Verbalizes/displays adequate comfort level or baseline comfort level  Outcome: Progressing Towards Goal  Flowsheets (Taken 7/27/2022 0803)  Verbalizes/displays adequate comfort level or baseline comfort level:   Encourage patient to monitor pain and request assistance   Assess pain using appropriate pain scale   Administer analgesics based on type and severity of pain and evaluate response   Implement non-pharmacological measures as appropriate and evaluate response     Problem: Safety - Adult  Goal: Free from fall injury  Outcome: Progressing Towards Goal  Flowsheets (Taken 7/27/2022 0803)  Free From Fall Injury: Instruct family/caregiver on patient safety     Problem: ABCDS Injury Assessment  Goal: Absence of physical injury  Outcome: Progressing Towards Goal  Flowsheets (Taken 7/27/2022 0803)  Absence of Physical Injury: Implement safety measures based on patient assessment     Problem: Skin/Tissue Integrity  Goal: Absence of new skin breakdown  Description: 1. Monitor for areas of redness and/or skin breakdown  2. Assess vascular access sites hourly  3. Every 4-6 hours minimum:  Change oxygen saturation probe site  4. Every 4-6 hours:  If on nasal continuous positive airway pressure, respiratory therapy assess nares and determine need for appliance change or resting period.   Outcome: Progressing Towards Goal     Problem: Musculoskeletal - Adult  Goal: Return ADL status to a safe level of function  Outcome: Progressing Towards Goal  Flowsheets (Taken 7/27/2022 0803)  Return ADL Status to a Safe Level of Function: Administer medication as ordered     Problem: Nutrition Deficit:  Goal: Optimize nutritional status  Outcome: Progressing Towards Goal  Flowsheets (Taken 7/27/2022 0803)  Nutrient intake appropriate for improving, restoring, or maintaining nutritional needs: Assess nutritional status and recommend course of action

## 2022-07-27 NOTE — PROGRESS NOTES
Pt is alert and oriented x4, resting  quietly in bed. Nightly medications and intake tolerated well. Pt having some nausea and pain - PRN medications given as prescribed. No other needs made known at this time. Fall precautions in place. Call light within reach. Will continue to monitor.     Electronically signed by Dyan Hassan RN on 7/27/2022 at 8:03 AM

## 2022-07-27 NOTE — PLAN OF CARE
Problem: Discharge Planning  Goal: Discharge to home or other facility with appropriate resources  7/27/2022 1600 by Meryle Riffle, RN  Flowsheets (Taken 7/27/2022 1600)  Discharge to home or other facility with appropriate resources:   Identify barriers to discharge with patient and caregiver   Arrange for needed discharge resources and transportation as appropriate  Problem: Pain  Goal: Verbalizes/displays adequate comfort level or baseline comfort level  7/27/2022 1600 by Meryle Riffle, RN  Flowsheets (Taken 7/27/2022 1600)  Verbalizes/displays adequate comfort level or baseline comfort level:   Encourage patient to monitor pain and request assistance   Assess pain using appropriate pain scale  Problem: Safety - Adult  Goal: Free from fall injury  7/27/2022 1600 by Meryle Riffle, RN  Flowsheets (Taken 7/27/2022 1600)  Free From Fall Injury: Instruct family/caregiver on patient safety  Problem: Nutrition Deficit:  Goal: Optimize nutritional status  7/27/2022 1600 by Meryle Riffle, RN  Flowsheets (Taken 7/27/2022 1600)  Nutrient intake appropriate for improving, restoring, or maintaining nutritional needs:   Assess nutritional status and recommend course of action   Monitor oral intake, labs, and treatment plans

## 2022-07-27 NOTE — DISCHARGE SUMMARY
Discharge summary    Patient:  Jessi Wilks  Unit/Bed:D4M-3876/3120-01   YOB: 1955       MRN: [id number] Acct: [de-identified]  PCP: Shubham Snyder MD    Date of Admission: 7/19/2022  --------------------------  Discharge Date:   7/27/2022    Admitting Physician: Marisa Flanagan MD     Discharge Physician: Jana Cao MD       Consults:     IP CONSULT TO ONCOLOGY  IP CONSULT TO GENERAL SURGERY    Disposition: Home    Condition at Discharge: Stable    Code Status:  Full Code           Patient Instructions:    Discharge lab/important testing/finding that need follow up : Outpatient follow-up with oncology    Activity: activity as tolerated    Diet: ADULT DIET; Regular  ADULT ORAL NUTRITION SUPPLEMENT; Breakfast, Lunch, Dinner; Standard High Calorie/High Protein Oral Supplement      Follow-up visits:   [address] [address]  [phone]        Jose Barkley, [address] [facility] [address] [facility] [place] [address]  [phone]    Follow up      Jose Barkley MD  [address] [facility] [address] [facility] [place] [address]  [phone]               Discharge Medications:     Current Discharge Medication List        START taking these medications    Details   morphine (MS CONTIN) 15 MG extended release tablet Take 1 tablet by mouth in the morning and at bedtime. Refills: 0    Comments: Reduce doses taken as pain becomes manageable    Prescribed by oncology team  Associated Diagnoses: Acute thromboembolism of inferior vena cava ([address] Pain in both lower extremities;  Metastatic non-small cell lung cancer (HCC)      naloxegol (MOVANTIK) 12.5 MG TABS tablet Take 1 tablet by mouth every morning (before breakfast)  Qty: 30 tablet, Refills: 0      !! sennosides-docusate sodium (SENOKOT-S) 8.6-50 MG tablet Take 2 tablets by mouth daily as needed for Constipation  Qty: 30 tablet, Refills: 1      apixaban (ELIQUIS) 5 MG TABS tablet Take 10 mg twice a day for 7 more doses (3.5 days), then take 5 mg twice a day  Qty: 60 tablet, Refills: 2      lidocaine 4 % external patch Place 1 patch onto the skin in the morning. Qty: 6 patch, Refills: 1      folic acid (FOLVITE) 1 MG tablet Take 1 tablet by mouth in the morning. Qty: 30 tablet, Refills: 3      !! sennosides-docusate sodium (SENOKOT-S) 8.6-50 MG tablet Take 1 tablet by mouth in the morning. Qty: 30 tablet, Refills: 0      polyethylene glycol (GLYCOLAX) 17 GM/SCOOP powder Take 17 g by mouth in the morning for 30 doses. Qty: 255 g, Refills: 1       !! - Potential duplicate medications found. Please discuss with provider. Current Discharge Medication List        CONTINUE these medications which have CHANGED    Details   oxyCODONE-acetaminophen (PERCOCET) 5-325 MG per tablet Take 2 tablets by mouth every 4 hours as needed for Pain for up to 3 days. Qty: 12 tablet, Refills: 0    Comments: Reduce doses taken as pain becomes manageable  Associated Diagnoses: Pain in both lower extremities; Metastatic non-small cell lung cancer ([facility] [address]           Current Discharge Medication List        CONTINUE these medications which have NOT CHANGED    Details   Melatonin 10 MG TABS Take 10 mg by mouth at bedtime      furosemide (LASIX) 20 MG tablet Take 1 tablet by mouth daily  Qty: 7 tablet, Refills: 0    Associated Diagnoses: Leg swelling      SUMAtriptan (IMITREX) 100 MG tablet Take 1 tablet by mouth daily as needed for Migraine  Qty: 9 tablet, Refills: 11    Associated Diagnoses: Migraine without aura and without status migrainosus, not intractable           Current Discharge Medication List        STOP taking these medications       doxyLAMINE succinate (GNP SLEEP AID) 25 MG tablet Comments:   Reason for Stopping:                    Chief Complaint:     Severe right upper quadrant abdominal pain.     Hospital Course/discharge diagnoses:     Marylou Alvarez is a 74 y.o. female hospitalized on 7/19/2022    for right upper quadrant abdominal pain, found to have metastatic lung cancer, r acute PE and inferior vena cava tumor clot           Stage IV non-small cell lung cancer, -Large left upper lobe mass with mass, diffuse hepatic metastasis    -Patient evaluated by oncology team, chemotherapy and palliative radiation treatment initiated this hospitalization, patient to follow-up with oncology team as an outpatient for continuation of therapy and outpatient PET scan. Acute pulmonary embolism. 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 A full discussion of the nature of anticoagulants has been carried out. A benefit risk analysis has been presented to the patient, so that they understand the justification for choosing anticoagulation at this time. Side effects of potential bleeding are discussed. patiet to call or go to ED if any signs of abnormal bleeding. Severe constipation, improving  Continue bowel regimen, short course of Movantik provided. Cancer related pain  Primarily in the right upper quadrant area, pain was intractable, oxycodone as needed was not successful, patient pain improved with addition of scheduled 15 mg MS Contin. Prescription provided by oncology team.        Patient reached the maximum benefit of this hospitalization. Patient  was hemodynamically stable on discharge   Available consultant are in agreement with discharge planning. Patient symptoms was controlled and in agreement with discharge planning. Time Spent on discharge is 35 minutes in the examination, evaluation, counseling and review of medications and discharge plan     ----------------      Subjective:     Patient seen and examined  Overnight events noted  RN and ancillary staff note reviewed  Doing well had bowel movement, no evidence of bleeding.   Receiving second dose of palliative radiation AST, ALT, BILIDIR, BILITOT, ALKPHOS in the last 72 hours. No results for input(s): INR in the last 72 hours. No results for input(s): Cherre Gash in the last 72 hours. Urinalysis:      Lab Results   Component Value Date/Time    NITRU POSITIVE 11/07/2019 04:45 PM    WBCUA  11/07/2019 04:45 PM    BACTERIA Rare 11/07/2019 04:45 PM    RBCUA 20-50 11/07/2019 04:45 PM    BLOODU LARGE 11/07/2019 04:45 PM    SPECGRAV 1.020 11/07/2019 04:45 PM    GLUCOSEU 100 11/07/2019 04:45 PM       Radiology:  XR CHEST PORTABLE   Final Result      1. Right-sided chest port has been placed via the right subclavian vein   since the prior study with the tip in the upper SVC. No evidence of   pneumothorax. 2.  Presumed mass within the left mid lung field is unchanged. 3.  Some opacity in left lower lung field is likely related to a suboptimal   inspiration and otherwise the lungs appear unchanged. FL VASCULAR ACCESS GUIDANCE   Final Result      MRI BRAIN W WO CONTRAST   Final Result   No brain metastases identified. Possible right maxillary sinus mucocele. CT CHEST ABDOMEN PELVIS W CONTRAST   Final Result   1. Large left upper lobe mass abutting the fissure and extending to the hilum   measuring 6.0 x 4.2 cm is consistent with known malignancy. 2. Large filling defect in the right interlobar pulmonary artery consistent   with acute pulmonary embolism and there may be smaller filling defects in   branch vessels. No definite right heart strain. 3. Moderate emphysema. A nodule in the medial right lower lobe measuring 1.0   x 0.8 cm could also be malignant and there are additional scattered tiny   nodules. 4. Diffuse hepatic metastatic disease. 5. Tumor thrombus extends into the intrahepatic IVC and common iliac veins   with possible extension into the external iliac veins but no definite   extension into the renal veins.    6. Prominent retrocrural, right upper quadrant, and retroperitoneal lymph   nodes also suspicious for metastatic disease. Findings were given to JAMEY Conner in the ED on 7/19/2022 at 5:38 p.m.                      Electronically signed by Ting Roy MD on 7/27/2022 at 12:51 PM

## 2022-07-27 NOTE — PROGRESS NOTES
Pt discharged to home. Transportation here with wheelchair. Accompanied by spouse. Transported in personal vehicle. Discharge instructions, Rx, and personal belongings given to pt. Explanation of discharge medications and instructions understood by verbal statement. No questions, comments or concerns at this time.    Electronically signed by Flako Eason RN on 7/27/2022 at 6:13 PM

## 2022-07-27 NOTE — PROGRESS NOTES
Patient IV removed from left hand with no complications guaze and tape applied. Port removed with no complications bandaid applied.  Patient

## 2022-07-27 NOTE — FLOWSHEET NOTE
Patient female 74 y.o [address] Calm . Patient received SOS and HC. Patient expressed gratitude.  Fr Grier 7/27/2022 07/27/22 1604   Encounter Summary   Encounter Overview/Reason  Initial Encounter;Spiritual/Emotional Needs; Rituals, Rites and Sacraments   Service Provided For: Patient;Patient and family together   Referral/Consult From: Volunteer   Support System Spouse   Last Encounter  07/27/22  (SOS ,[address] Support by  CO 7/27)   Complexity of Encounter Moderate   Begin Time 1310   End Time  1330   Total Time Calculated 20 min   Encounter    Type Initial Screen/Assessment   Spiritual/Emotional needs   Type Spiritual Support   Rituals, Rites and Sacraments   Type Sacrament of Sick;Jewish Communion   Assessment/Intervention/Outcome   Assessment Calm;Coping; Hopeful;Peaceful   Intervention Active listening   Outcome Comfort;Coping;Encouraged;Engaged in conversation;Expressed Gratitude

## 2022-08-12 ENCOUNTER — APPOINTMENT (OUTPATIENT)
Dept: GENERAL RADIOLOGY | Age: 67
DRG: 054 | End: 2022-08-12
Payer: OTHER GOVERNMENT

## 2022-08-12 ENCOUNTER — APPOINTMENT (OUTPATIENT)
Dept: CT IMAGING | Age: 67
DRG: 054 | End: 2022-08-12
Payer: OTHER GOVERNMENT

## 2022-08-12 ENCOUNTER — HOSPITAL ENCOUNTER (INPATIENT)
Age: 67
LOS: 4 days | Discharge: HOSPICE | DRG: 054 | End: 2022-08-16
Attending: EMERGENCY MEDICINE | Admitting: INTERNAL MEDICINE
Payer: OTHER GOVERNMENT

## 2022-08-12 ENCOUNTER — TELEPHONE (OUTPATIENT)
Dept: OTHER | Facility: CLINIC | Age: 67
End: 2022-08-12

## 2022-08-12 DIAGNOSIS — L03.115 CELLULITIS OF RIGHT LOWER EXTREMITY: ICD-10-CM
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
## 2022-08-12 DIAGNOSIS — E87.1 HYPONATREMIA: ICD-10-CM

## 2022-08-12 DIAGNOSIS — R60.1 ANASARCA: Primary | ICD-10-CM

## 2022-08-12 DIAGNOSIS — R53.1 GENERAL WEAKNESS: ICD-10-CM

## 2022-08-12 DIAGNOSIS — E86.0 DEHYDRATION: ICD-10-CM

## 2022-08-12 LAB
ALBUMIN SERPL-MCNC: 2.3 G/DL (ref 3.4–5)
ALP BLD-CCNC: 790 U/L (ref 40–129)
ALT SERPL-CCNC: 44 U/L (ref 10–40)
AMORPHOUS: PRESENT
ANION GAP SERPL CALCULATED.3IONS-SCNC: 16 MMOL/L (ref 3–16)
AST SERPL-CCNC: 275 U/L (ref 15–37)
BACTERIA: ABNORMAL /HPF
BASOPHILS ABSOLUTE: 0.1 K/UL (ref 0–0.2)
BASOPHILS RELATIVE PERCENT: 1.1 %
BILIRUB SERPL-MCNC: 1.4 MG/DL (ref 0–1)
BILIRUBIN DIRECT: 0.7 MG/DL (ref 0–0.3)
BILIRUBIN URINE: NEGATIVE
BILIRUBIN, INDIRECT: 0.7 MG/DL (ref 0–1)
BLOOD, URINE: NEGATIVE
BUN BLDV-MCNC: 23 MG/DL (ref 7–20)
CALCIUM SERPL-MCNC: 8.7 MG/DL (ref 8.3–10.6)
CHLORIDE BLD-SCNC: 84 MMOL/L (ref 99–110)
CLARITY: ABNORMAL
CO2: 23 MMOL/L (ref 21–32)
COLOR: ABNORMAL
CREAT SERPL-MCNC: 1 MG/DL (ref 0.6–1.2)
EKG ATRIAL RATE: 90 BPM
EKG DIAGNOSIS: NORMAL
EKG P AXIS: 79 DEGREES
EKG P-R INTERVAL: 144 MS
EKG Q-T INTERVAL: 384 MS
EKG QRS DURATION: 80 MS
EKG QTC CALCULATION (BAZETT): 469 MS
EKG R AXIS: 16 DEGREES
EKG T AXIS: 69 DEGREES
EKG VENTRICULAR RATE: 90 BPM
EOSINOPHILS ABSOLUTE: 0 K/UL (ref 0–0.6)
EOSINOPHILS RELATIVE PERCENT: 0.1 %
EPITHELIAL CELLS, UA: 4 /HPF (ref 0–5)
GFR AFRICAN AMERICAN: >60
GFR NON-AFRICAN AMERICAN: 55
GLUCOSE BLD-MCNC: 91 MG/DL (ref 70–99)
GLUCOSE URINE: NEGATIVE MG/DL
HCT VFR BLD CALC: 33 % (ref 36–48)
HEMOGLOBIN: 11.1 G/DL (ref 12–16)
HYALINE CASTS: 28 /LPF (ref 0–8)
INR BLD: 3.04 (ref 0.87–1.14)
KETONES, URINE: ABNORMAL MG/DL
LACTIC ACID, SEPSIS: 2.6 MMOL/L (ref 0.4–1.9)
LACTIC ACID, SEPSIS: 3 MMOL/L (ref 0.4–1.9)
LACTIC ACID: 2.7 MMOL/L (ref 0.4–2)
LEUKOCYTE ESTERASE, URINE: NEGATIVE
LYMPHOCYTES ABSOLUTE: 0.9 K/UL (ref 1–5.1)
LYMPHOCYTES RELATIVE PERCENT: 15.4 %
MCH RBC QN AUTO: 30 PG (ref 26–34)
MCHC RBC AUTO-ENTMCNC: 33.5 G/DL (ref 31–36)
MCV RBC AUTO: 89.5 FL (ref 80–100)
MICROSCOPIC EXAMINATION: YES
MONOCYTES ABSOLUTE: 0.4 K/UL (ref 0–1.3)
MONOCYTES RELATIVE PERCENT: 6.1 %
MUCUS: PRESENT
NEUTROPHILS ABSOLUTE: 4.7 K/UL (ref 1.7–7.7)
NEUTROPHILS RELATIVE PERCENT: 77.3 %
NITRITE, URINE: NEGATIVE
PDW BLD-RTO: 17.4 % (ref 12.4–15.4)
PH UA: 5 (ref 5–8)
PLATELET # BLD: 215 K/UL (ref 135–450)
PMV BLD AUTO: 7.5 FL (ref 5–10.5)
POTASSIUM REFLEX MAGNESIUM: 4.3 MMOL/L (ref 3.5–5.1)
PRO-BNP: 860 PG/ML (ref 0–124)
PROTEIN UA: 30 MG/DL
PROTHROMBIN TIME: 31.6 SEC (ref 11.7–14.5)
RBC # BLD: 3.69 M/UL (ref 4–5.2)
RBC UA: 4 /HPF (ref 0–4)
SARS-COV-2, NAAT: NOT DETECTED
SODIUM BLD-SCNC: 123 MMOL/L (ref 136–145)
SPECIFIC GRAVITY UA: 1.03 (ref 1–1.03)
TOTAL PROTEIN: 5.9 G/DL (ref 6.4–8.2)
TROPONIN: <0.01 NG/ML
URINE REFLEX TO CULTURE: ABNORMAL
URINE TYPE: ABNORMAL
UROBILINOGEN, URINE: 1 E.U./DL
WBC # BLD: 6.1 K/UL (ref 4–11)
WBC UA: 3 /HPF (ref 0–5)

## 2022-08-12 PROCEDURE — 93005 ELECTROCARDIOGRAM TRACING: CPT | Performed by: NURSE PRACTITIONER

## 2022-08-12 PROCEDURE — 85025 COMPLETE CBC W/AUTO DIFF WBC: CPT

## 2022-08-12 PROCEDURE — 1200000000 HC SEMI PRIVATE

## 2022-08-12 PROCEDURE — 6370000000 HC RX 637 (ALT 250 FOR IP): Performed by: NURSE PRACTITIONER

## 2022-08-12 PROCEDURE — 6360000002 HC RX W HCPCS: Performed by: NURSE PRACTITIONER

## 2022-08-12 PROCEDURE — 36591 DRAW BLOOD OFF VENOUS DEVICE: CPT

## 2022-08-12 PROCEDURE — 99285 EMERGENCY DEPT VISIT HI MDM: CPT

## 2022-08-12 PROCEDURE — 80076 HEPATIC FUNCTION PANEL: CPT

## 2022-08-12 PROCEDURE — 94761 N-INVAS EAR/PLS OXIMETRY MLT: CPT

## 2022-08-12 PROCEDURE — 71045 X-RAY EXAM CHEST 1 VIEW: CPT

## 2022-08-12 PROCEDURE — 85610 PROTHROMBIN TIME: CPT

## 2022-08-12 PROCEDURE — 6360000002 HC RX W HCPCS: Performed by: INTERNAL MEDICINE

## 2022-08-12 PROCEDURE — 83880 ASSAY OF NATRIURETIC PEPTIDE: CPT

## 2022-08-12 PROCEDURE — 71260 CT THORAX DX C+: CPT

## 2022-08-12 PROCEDURE — 36556 INSERT NON-TUNNEL CV CATH: CPT

## 2022-08-12 PROCEDURE — 84484 ASSAY OF TROPONIN QUANT: CPT

## 2022-08-12 PROCEDURE — 93010 ELECTROCARDIOGRAM REPORT: CPT | Performed by: INTERNAL MEDICINE

## 2022-08-12 PROCEDURE — 80048 BASIC METABOLIC PNL TOTAL CA: CPT

## 2022-08-12 PROCEDURE — 87635 SARS-COV-2 COVID-19 AMP PRB: CPT

## 2022-08-12 PROCEDURE — 83605 ASSAY OF LACTIC ACID: CPT

## 2022-08-12 PROCEDURE — 81001 URINALYSIS AUTO W/SCOPE: CPT

## 2022-08-12 PROCEDURE — 6370000000 HC RX 637 (ALT 250 FOR IP): Performed by: INTERNAL MEDICINE

## 2022-08-12 PROCEDURE — 2580000003 HC RX 258: Performed by: INTERNAL MEDICINE

## 2022-08-12 PROCEDURE — 84300 ASSAY OF URINE SODIUM: CPT

## 2022-08-12 PROCEDURE — 6360000004 HC RX CONTRAST MEDICATION: Performed by: NURSE PRACTITIONER

## 2022-08-12 PROCEDURE — 83935 ASSAY OF URINE OSMOLALITY: CPT

## 2022-08-12 RX ORDER — PROMETHAZINE HYDROCHLORIDE 25 MG/1
12.5 TABLET ORAL EVERY 6 HOURS PRN
Status: DISCONTINUED | OUTPATIENT
Start: 2022-08-12 | End: 2022-08-16 | Stop reason: HOSPADM

## 2022-08-12 RX ORDER — ENOXAPARIN SODIUM 100 MG/ML
40 INJECTION SUBCUTANEOUS DAILY
Status: DISCONTINUED | OUTPATIENT
Start: 2022-08-12 | End: 2022-08-12

## 2022-08-12 RX ORDER — ACETAMINOPHEN 325 MG/1
650 TABLET ORAL EVERY 6 HOURS PRN
Status: DISCONTINUED | OUTPATIENT
Start: 2022-08-12 | End: 2022-08-12

## 2022-08-12 RX ORDER — POTASSIUM CHLORIDE 7.45 MG/ML
10 INJECTION INTRAVENOUS PRN
Status: DISCONTINUED | OUTPATIENT
Start: 2022-08-12 | End: 2022-08-14 | Stop reason: ALTCHOICE

## 2022-08-12 RX ORDER — ONDANSETRON 2 MG/ML
4 INJECTION INTRAMUSCULAR; INTRAVENOUS EVERY 6 HOURS PRN
Status: DISCONTINUED | OUTPATIENT
Start: 2022-08-12 | End: 2022-08-16 | Stop reason: HOSPADM

## 2022-08-12 RX ORDER — POTASSIUM CHLORIDE 20 MEQ/1
40 TABLET, EXTENDED RELEASE ORAL PRN
Status: DISCONTINUED | OUTPATIENT
Start: 2022-08-12 | End: 2022-08-14 | Stop reason: ALTCHOICE

## 2022-08-12 RX ORDER — OXYCODONE HYDROCHLORIDE 5 MG/1
5 TABLET ORAL EVERY 4 HOURS PRN
Status: DISCONTINUED | OUTPATIENT
Start: 2022-08-12 | End: 2022-08-16 | Stop reason: HOSPADM

## 2022-08-12 RX ORDER — FOLIC ACID 1 MG/1
1 TABLET ORAL DAILY
Status: DISCONTINUED | OUTPATIENT
Start: 2022-08-13 | End: 2022-08-16

## 2022-08-12 RX ORDER — OXYCODONE HYDROCHLORIDE AND ACETAMINOPHEN 5; 325 MG/1; MG/1
1 TABLET ORAL EVERY 4 HOURS PRN
Status: ON HOLD | COMMUNITY
End: 2022-08-16 | Stop reason: HOSPADM

## 2022-08-12 RX ORDER — ACETAMINOPHEN 650 MG/1
650 SUPPOSITORY RECTAL EVERY 6 HOURS PRN
Status: DISCONTINUED | OUTPATIENT
Start: 2022-08-12 | End: 2022-08-12

## 2022-08-12 RX ORDER — PROCHLORPERAZINE MALEATE 10 MG
10 TABLET ORAL EVERY 6 HOURS PRN
Status: ON HOLD | COMMUNITY
End: 2022-08-16 | Stop reason: HOSPADM

## 2022-08-12 RX ORDER — SODIUM CHLORIDE 0.9 % (FLUSH) 0.9 %
10 SYRINGE (ML) INJECTION EVERY 12 HOURS SCHEDULED
Status: DISCONTINUED | OUTPATIENT
Start: 2022-08-12 | End: 2022-08-16 | Stop reason: HOSPADM

## 2022-08-12 RX ORDER — SODIUM CHLORIDE 0.9 % (FLUSH) 0.9 %
10 SYRINGE (ML) INJECTION PRN
Status: DISCONTINUED | OUTPATIENT
Start: 2022-08-12 | End: 2022-08-16 | Stop reason: HOSPADM

## 2022-08-12 RX ORDER — OXYCODONE HYDROCHLORIDE AND ACETAMINOPHEN 5; 325 MG/1; MG/1
1 TABLET ORAL EVERY 4 HOURS PRN
Status: DISCONTINUED | OUTPATIENT
Start: 2022-08-12 | End: 2022-08-16 | Stop reason: HOSPADM

## 2022-08-12 RX ORDER — MAGNESIUM SULFATE IN WATER 40 MG/ML
2000 INJECTION, SOLUTION INTRAVENOUS PRN
Status: DISCONTINUED | OUTPATIENT
Start: 2022-08-12 | End: 2022-08-14 | Stop reason: ALTCHOICE

## 2022-08-12 RX ORDER — ONDANSETRON HYDROCHLORIDE 8 MG/1
8 TABLET, FILM COATED ORAL EVERY 8 HOURS PRN
Status: ON HOLD | COMMUNITY
End: 2022-08-16 | Stop reason: HOSPADM

## 2022-08-12 RX ORDER — SUMATRIPTAN 50 MG/1
100 TABLET, FILM COATED ORAL DAILY PRN
Status: DISCONTINUED | OUTPATIENT
Start: 2022-08-12 | End: 2022-08-16 | Stop reason: HOSPADM

## 2022-08-12 RX ORDER — ONDANSETRON 2 MG/ML
4 INJECTION INTRAMUSCULAR; INTRAVENOUS ONCE
Status: COMPLETED | OUTPATIENT
Start: 2022-08-12 | End: 2022-08-12

## 2022-08-12 RX ORDER — SODIUM CHLORIDE 9 MG/ML
INJECTION, SOLUTION INTRAVENOUS PRN
Status: DISCONTINUED | OUTPATIENT
Start: 2022-08-12 | End: 2022-08-16 | Stop reason: HOSPADM

## 2022-08-12 RX ORDER — MORPHINE SULFATE 15 MG/1
15 TABLET, FILM COATED, EXTENDED RELEASE ORAL 2 TIMES DAILY
Status: DISCONTINUED | OUTPATIENT
Start: 2022-08-12 | End: 2022-08-16 | Stop reason: HOSPADM

## 2022-08-12 RX ORDER — SENNA AND DOCUSATE SODIUM 50; 8.6 MG/1; MG/1
1 TABLET, FILM COATED ORAL DAILY
Status: DISCONTINUED | OUTPATIENT
Start: 2022-08-13 | End: 2022-08-16 | Stop reason: HOSPADM

## 2022-08-12 RX ORDER — LANOLIN ALCOHOL/MO/W.PET/CERES
9 CREAM (GRAM) TOPICAL NIGHTLY
Status: DISCONTINUED | OUTPATIENT
Start: 2022-08-12 | End: 2022-08-16 | Stop reason: HOSPADM

## 2022-08-12 RX ORDER — 0.9 % SODIUM CHLORIDE 0.9 %
1000 INTRAVENOUS SOLUTION INTRAVENOUS ONCE
Status: DISCONTINUED | OUTPATIENT
Start: 2022-08-12 | End: 2022-08-12

## 2022-08-12 RX ADMIN — ACETAMINOPHEN 650 MG: 325 TABLET ORAL at 19:11

## 2022-08-12 RX ADMIN — ONDANSETRON 4 MG: 2 INJECTION INTRAMUSCULAR; INTRAVENOUS at 19:11

## 2022-08-12 RX ADMIN — ONDANSETRON 4 MG: 2 INJECTION INTRAMUSCULAR; INTRAVENOUS at 16:50

## 2022-08-12 RX ADMIN — IOPAMIDOL 75 ML: 755 INJECTION, SOLUTION INTRAVENOUS at 15:12

## 2022-08-12 RX ADMIN — SODIUM CHLORIDE, PRESERVATIVE FREE 10 ML: 5 INJECTION INTRAVENOUS at 22:08

## 2022-08-12 RX ADMIN — Medication 9 MG: at 22:12

## 2022-08-12 RX ADMIN — OXYCODONE 5 MG: 5 TABLET ORAL at 22:12

## 2022-08-12 RX ADMIN — MORPHINE SULFATE 15 MG: 15 TABLET, FILM COATED, EXTENDED RELEASE ORAL at 20:45

## 2022-08-12 RX ADMIN — APIXABAN 5 MG: 5 TABLET, FILM COATED ORAL at 22:12

## 2022-08-12 ASSESSMENT — PAIN SCALES - GENERAL
PAINLEVEL_OUTOF10: 7
PAINLEVEL_OUTOF10: 8

## 2022-08-12 ASSESSMENT — PAIN DESCRIPTION - DESCRIPTORS
DESCRIPTORS_2: SHARP;STABBING
DESCRIPTORS: OTHER (COMMENT)

## 2022-08-12 ASSESSMENT — PAIN DESCRIPTION - ORIENTATION
ORIENTATION_2: RIGHT
ORIENTATION: RIGHT;ANTERIOR
ORIENTATION: RIGHT

## 2022-08-12 ASSESSMENT — PAIN - FUNCTIONAL ASSESSMENT
PAIN_FUNCTIONAL_ASSESSMENT: PREVENTS OR INTERFERES WITH ALL ACTIVE AND SOME PASSIVE ACTIVITIES
PAIN_FUNCTIONAL_ASSESSMENT: PREVENTS OR INTERFERES WITH MANY ACTIVE NOT PASSIVE ACTIVITIES

## 2022-08-12 ASSESSMENT — PAIN DESCRIPTION - FREQUENCY
FREQUENCY: CONTINUOUS
FREQUENCY: CONTINUOUS

## 2022-08-12 ASSESSMENT — PAIN DESCRIPTION - ONSET: ONSET: ON-GOING

## 2022-08-12 ASSESSMENT — PAIN DESCRIPTION - PAIN TYPE
TYPE: ACUTE PAIN
TYPE: ACUTE PAIN;CHRONIC PAIN

## 2022-08-12 ASSESSMENT — PAIN DESCRIPTION - LOCATION
LOCATION: ABDOMEN
LOCATION_2: LEG
LOCATION: ABDOMEN;LEG

## 2022-08-12 ASSESSMENT — PAIN DESCRIPTION - INTENSITY: RATING_2: 8

## 2022-08-12 NOTE — PROGRESS NOTES
Pharmacy Medication Reconciliation Note     List of medications patient is currently taking is complete. Source of information:   1. Conversation with patient   2. EMR    Notes regarding home medications:   1. Patient had some of her morning home medication doses today before presenting to the ER. 2. Added Percocet to med list - patient taking for breakthrough pain  3. Patient states she was unable to get Movantik filled at the pharmacy although she had a new prescription for it. States she hasn't taken it for about a week and hasn't been going to the bathroom right since stopping it. Has been taking senna/docusate daily.       Alejandra Archuleta, PharmD  8/12/2022 2:35 PM

## 2022-08-12 NOTE — CONSULTS
Oncology Consult    See dictation    A/P:  1 Stage 4 NSCLC/anasarca. This is of unclear etiology. She did have an IVC clot on her last CT scan. She was evaluated by vascular surgery and interventional cardiology during that admission to consider some sort of thrombectomy but they declined at that time. She was on heparin and discharged on Eliquis. I  asked the ER to get CT scans to see if that clot is progressing. She also likely needs an echo. She got 1 cycle of carboplatin and Alimta and was part supposed to get her second cycle of carboplatin and Alimta with Keytruda added in today. She has multiple liver lesions. Her LFTs are increasing. Her prognosis is guarded. She likely has a component of cellulitis of the right leg. The ER started cefepime. Thank you for the consultation. We will follow closely.     Jackie Miller MD

## 2022-08-12 NOTE — CONSULTS
[address]                                  CONSULTATION    PATIENT NAME: Brooke Santo                     :        1955  MED REC NO:   [id number]                          ROOM:       [id number]  ACCOUNT NO:   [de-identified]                           ADMIT DATE: 2022  PROVIDER:     Venus Lowe MD    CONSULT DATE:  2022    ONCOLOGY CONSULTATION    CONSULTING PROVIDER:  Steven Cardenas CNP    REASON FOR CONSULTATION:  Stage IV non-small cell lung cancer/anasarca. HISTORY OF PRESENT ILLNESS:  The patient is a 67-year-old female that I  followed for stage IV non-small cell lung cancer with widespread  metastasis, who presented to the hospital with increasing anasarca. She  states over the previous few weeks her baseline edema has gotten  significantly worse. It has gotten to so bad today that she could not  even lift her legs and had to be brought to the emergency room by the  squad. She is on Eliquis for a history of PE as well as IVC clot. She  received one cycle of carboplatin plus Alimta three weeks ago and was  supposed to get carboplatin plus Alimta plus Bobie Pander in my office  today. She could not make it in. She denies any fevers or chills or  sweats. She is not eating much. PAST MEDICAL HISTORY:  1.  Stage IV lung adenocarcinoma. She presented to [facility]  on 2022 with right upper quadrant abdominal pain. Prior to that, she  was having pain in her right lower extremity. An MRI of her leg showed  a 3.8 cm mass in the musculature of the right lower leg. The biopsy of  this by Dr. Pierre Oliver shows metastatic adenocarcinoma. Her staging CT  showed a large right upper lobe mass as well as multiple liver lesions,  so this clinically was a lung primary. She also had IVC clot and  intraabdominal lymphadenopathy.   She received her first cycle of  carboplatin and Alimta in the hospital three weeks. She did receive  palliative radiation therapy to the right leg. 2.  Chronic migraines. PAST SURGICAL HISTORY:  1. Appendectomy. 2.  Tonsillectomy. FAMILY HISTORY:  Noncontributory. SOCIAL HISTORY:  She is . She smokes one pack per day and has  done so for 60 years. She does not drink. She works in the [email or web address] in the  [employer]. MEDICATION LIST:  Please see the list in the chart. REVIEW OF SYSTEMS:  She denies any recent fever, chills, sweats, nausea,  vomiting, abdominal pain, chest pain, shortness of breath, headaches,  any new bone aches, dysphagia, odynophagia, diarrhea, constipation,  hemoptysis, hematemesis, change in vision/hearing/smell/taste,  neuropathy, skin rashes, productive cough, urinary or bowel prolapse or  incontinence, petechiae, purpura, skin rashes, vaginal bleeding,  pruritus, hallucinations, nasal congestion or drainage, seizures,  strokes, syncope, depression, anxiety, suicidal ideations. She has  moderate fatigue and some severe lower extremity edema. Her 10-system  review of systems is otherwise negative. PHYSICAL EXAMINATION:  GENERAL:  She is in no acute distress. VITAL SIGNS:  She is afebrile with normal vital signs. HEENT:  Her pupils are round and reactive to light and accommodation. Extraocular muscles are intact. NECK:  She has no jugular venous distention. No thyromegaly. Oropharynx is clear. She has no carotid bruits. She has no palpable  lymphadenopathy. HEART:  Regular rate and rhythm. LUNGS:  Clear to auscultation bilaterally. ABDOMEN:  Nondistended. Her liver span is greatly increased. EXTREMITIES:  She has 4+ lower extremity edema to the thigh. NEUROLOGIC:  Significant for generalized weakness. LABORATORY DATA:  Her white blood cell count is 6.1, hemoglobin 11.1,  platelets of 829. ASSESSMENT AND PLAN:  Stage IV non-small cell cancer/anasarca. The  anasarca is of unclear etiology.   She did have an IVC clot on her last  CT scan. She was evaluated by Vascular Surgery and intervention with  Cardiology during that admission to consider some sort of thrombectomy,  but they declined it at that time. She was on heparin and discharged on  Eliquis. I asked the ER to get CT scans to see if that clot is  progressing. She will also likely need an echo. She got one cycle of  carboplatin and Alimta, and was supposed to get her second cycle of  carboplatin and Alimta with Keytruda added in today. She has multiple  liver lesions. Her LFTs are increasing. Her prognosis is guarded. She  likely has a component of cellulitis of her right leg. The ER has  started cefepime. Thank you for the consultation. We will follow closely.         Ioana Ni MD    D: 08/12/2022 16:00:21       T: 08/12/2022 18:21:50     [email or web address]  Job#: [id number]     Doc#: [id number]    CC:

## 2022-08-12 NOTE — ED PROVIDER NOTES
[facility]  eMERGENCY dEPARTMENT eNCOUnter   Physician Attestation    Pt Name: Dmitri Flores  MRN: [id number]  Adi 1955  Date of evaluation: 8/12/22        Physician Note:    This patient was seen by the advanced practice provider. I have personally performed a face to face diagnostic evaluation on this patient and performed a substantive portion of the visit including all aspects of the medical decision making. History: Briefly, 74 y.o. female presents with generalized weakness and swelling. The patient has a history of non-small cell lung cancer with hepatic metastases. Recently admitted and was found to have a right-sided pulmonary embolus but no RV strain. She is on Xarelto. Presents complaining of generalized weakness and swelling. No shortness of breath fever or chills. Physical Exam  Constitutional:       Appearance: She is well-developed. She is not diaphoretic. HENT:      Head: Normocephalic and atraumatic. Right Ear: External ear normal.      Left Ear: External ear normal.   Eyes:      General: No scleral icterus. Right eye: No discharge. Left eye: No discharge. Neck:      Thyroid: No thyromegaly. Vascular: No JVD. Trachea: No tracheal deviation. Cardiovascular:      Rate and Rhythm: Normal rate and regular rhythm. Heart sounds: Normal heart sounds. No murmur heard. No friction rub. No gallop. Comments: She has some chronic erythema of both lower extremities and the right leg is a bit more swollen than the left. She also has generalized anasarca with abdominal wall edema edema throughout the legs and sacral edema. Pulmonary:      Effort: Pulmonary effort is normal. No respiratory distress. Breath sounds: Normal breath sounds. No stridor. No wheezing or rales. Abdominal:      General: There is no distension. Palpations: Abdomen is soft. Tenderness:  There is no abdominal tenderness. There is no guarding or rebound. Musculoskeletal:         General: No tenderness. Cervical back: Normal range of motion. Right lower le+ Pitting Edema present. Left lower le+ Pitting Edema present. Skin:     General: Skin is warm and dry. Findings: No rash (On exposed body surfaces). Neurological:      Mental Status: She is alert and oriented to person, place, and time. Coordination: Coordination normal.   Psychiatric:         Behavior: Behavior normal.         Thought Content: Thought content normal.       MDM:     EKG visualized preliminarily interpreted by myself shows sinus rhythm. The rate is 90 with an axis of 16. There are some ST and T wave findings across the precordium. Inverted P wave in V1 and V2. Questionable right atrial enlargement noted also inferiorly. Low voltage EKG. No ST elevation. Intervals are normal.    XR CHEST PORTABLE    Result Date: 2022  EXAMINATION: ONE XRAY VIEW OF THE CHEST 2022 12:23 pm COMPARISON: Chest x-ray dated 2022 HISTORY: ORDERING SYSTEM PROVIDED HISTORY: weakness TECHNOLOGIST PROVIDED HISTORY: Reason for exam:->weakness Reason for Exam: weakness FINDINGS: Stable left upper lobe mass. No acute airspace infiltrate. No pneumothorax or pleural effusion. Stable cardiomediastinal silhouette. Stable positioning of a right-sided chest port catheter     Stable left upper lobe mass. No acute cardiopulmonary findings     CT CHEST ABDOMEN PELVIS W CONTRAST    Result Date: 2022  EXAMINATION: CT OF THE CHEST, ABDOMEN, AND PELVIS WITH CONTRAST 2022 3:15 pm TECHNIQUE: CT of the chest, abdomen and pelvis was performed with the administration of intravenous contrast. Multiplanar reformatted images are provided for review. Automated exposure control, iterative reconstruction, and/or weight based adjustment of the mA/kV was utilized to reduce the radiation dose to as low as reasonably achievable. COMPARISON: CT chest, abdomen, and pelvis 07/19/2022. Chest radiograph 08/12/2022. HISTORY: ORDERING SYSTEM PROVIDED HISTORY: anasarca worsening, look at [facility] clot TECHNOLOGIST PROVIDED HISTORY: Reason for exam:->anasarca worsening, look at IVC clot Additional Contrast?->None FINDINGS: Chest: Mediastinum: The thoracic aorta is normal in caliber with mild atherosclerosis. No acute aortic abnormality identified. The coronary arteries are unremarkable. No acute abnormality in the branch vessels of the superior mediastinum and lower neck. A right chest port terminates in the lower superior vena cava. The main pulmonary artery is normal in caliber. The previously identified pulmonary embolism in the right interlobar pulmonary artery is no longer seen. No pulmonary embolism identified within limits of the exam.  The heart is normal in size. No pericardial effusion. The mediastinal esophagus and thyroid gland are unremarkable. No lymphadenopathy or pneumomediastinum. Lungs/pleura: Occlusion of the left apicoposterior segmental bronchus of the left upper lobe secondary to the adjacent mass. Mild secretions in the right lower lobe bronchi. No pleural effusion or pneumothorax. Moderate emphysematous changes. The left upper lobe mass abuts the major fissure and measures approximately 6.6 x 4.1 cm on image 40 series 4 stable to slightly increased in size from 07/19/2022.  6 mm nodule in the medial right lung base on image 82 (previously 9 mm on 07/19/2022). No consolidation or interlobular septal thickening. There are a few other scattered smaller and not significantly changed bilateral pulmonary nodules. Soft Tissues/Bones: Bilateral breast implants are in place. No axillary or supraclavicular lymphadenopathy identified. No acute osseous or soft tissue abnormality. No suspicious lytic or blastic osseous lesion. Abdomen/Pelvis: Organs: Extensive hepatic metastases not significantly changed from 07/19/2022.   The gallbladder is unremarkable. No biliary ductal dilatation identified. The pancreas and spleen are unremarkable. Diffuse thickening of the right adrenal gland without significant change. Nodular thickening of the left adrenal gland also without significant change. Technically indeterminate 1.6 cm cystic lesion in the upper pole of the left kidney measuring mildly hyperdense to water without significant change. Subcentimeter cystic lesion in the right kidney too small to definitively characterize but likely to represent a benign cyst.  No obstructive uropathy identified. GI/Bowel: Colonic diverticulosis without evidence of acute diverticulitis. No evidence of acute appendicitis. The stomach and small bowel are normal in appearance. No obstruction or wall thickening identified. Pelvis: The urinary bladder is normal in appearance. The uterus and bilateral adnexae are unremarkable. No free fluid in the pelvis. No pelvic or inguinal lymphadenopathy. Peritoneum/Retroperitoneum: The abdominal aorta is normal in caliber with mild to moderate atherosclerotic vascular disease. There appears to be a filling defect within the inferior vena cava extending to the iliac vein confluence in the pelvis. This is stable to mildly decreased in size from 07/19/2022. Left periaortic node measuring 2.7 x 1.4 cm on axial image 60 (previously 1.9 x 1.2 cm on 07/19/2022). Multiple other enlarged retroperitoneal nodes. No mesenteric lymphadenopathy. No ascites or pneumoperitoneum. Bones/Soft Tissues: Extensive subcutaneous edema. There is an aggressive appearing lytic lesion in the left posterosuperior acetabulum. This is grossly unchanged from the previous exam.  No acute osseous abnormality. CHEST: 1. Resolution of the previously identified pulmonary embolism within the right inter lobar pulmonary artery. 2. Large left upper lobe mass stable to slightly increased in size from 07/19/2022.  3. 6 mm nodule in the medial TRACE (A) Negative mg/dL    Specific Gravity, UA 1.031 1.005 - 1.030    Blood, Urine Negative Negative    pH, UA 5.0 5.0 - 8.0    Protein, UA 30 (A) Negative mg/dL    Urobilinogen, Urine 1.0 <2.0 E.U./dL    Nitrite, Urine Negative Negative    Leukocyte Esterase, Urine Negative Negative    Microscopic Examination YES     Urine Type NotGiven    Brain Natriuretic Peptide   Result Value Ref Range    Pro- (H) 0 - 124 pg/mL   Troponin   Result Value Ref Range    Troponin <0.01 <0.01 ng/mL   Basic Metabolic Panel w/ Reflex to MG   Result Value Ref Range    Sodium 123 (L) 136 - 145 mmol/L    Chloride 84 (L) 99 - 110 mmol/L    CO2 23 21 - 32 mmol/L    Anion Gap 16 3 - 16    Glucose 91 70 - 99 mg/dL    BUN 23 (H) 7 - 20 mg/dL    Creatinine 1.0 0.6 - 1.2 mg/dL    GFR Non-African American 55 (A) >60    GFR African American >60 >60    Calcium 8.7 8.3 - 10.6 mg/dL    Potassium reflex Magnesium 4.3 3.5 - 5.1 mmol/L   Hepatic Function Panel   Result Value Ref Range    Total Protein 5.9 (L) 6.4 - 8.2 g/dL    Albumin 2.3 (L) 3.4 - 5.0 g/dL    Total Bilirubin 1.4 (H) 0.0 - 1.0 mg/dL    Alkaline Phosphatase 790 (H) 40 - 129 U/L    ALT 44 (H) 10 - 40 U/L     (H) 15 - 37 U/L    Bilirubin, Direct 0.7 (H) 0.0 - 0.3 mg/dL    Bilirubin, Indirect 0.7 0.0 - 1.0 mg/dL   Lactate, Sepsis   Result Value Ref Range    Lactic Acid, Sepsis 3.0 (H) 0.4 - 1.9 mmol/L   Protime-INR   Result Value Ref Range    Protime 31.6 (H) 11.7 - 14.5 sec    INR 3.04 (H) 0.87 - 1.14   EKG 12 Lead   Result Value Ref Range    Ventricular Rate 90 BPM    Atrial Rate 90 BPM    P-R Interval 144 ms    QRS Duration 80 ms    Q-T Interval 384 ms    QTc Calculation (Bazett) 469 ms    P Axis 79 degrees    R Axis 16 degrees    T Axis 69 degrees    Diagnosis       Normal sinus rhythmNonspecific T wave abnormalityConfirmed by JC THOMAS, Shanita Petit ([id number]) on 8/12/2022 1:06:09 PM       CRITICAL CARE  I personally saw the patient and independently provided 0 minutes of non-concurrent critical care out of the total shared critical care time provided. This excludes seperately billable procedures. Critical care time was provided for 0 that required close evaluation and/or intervention with concern for potential patient decompensation. 1. Anasarca    2. Dehydration    3. General weakness    4. Fall from bed, initial encounter    5. Hyponatremia    6. Cellulitis of right lower extremity          DISPOSITION/PLAN  PATIENT REFERRED TO:  No follow-up provider specified. DISCHARGE MEDICATIONS:  New Prescriptions    No medications on file         Edilma King MD        This report has been produced using speech recognition software and may contain errors related to that system including errors in grammar, punctuation, and spelling, as well as words and phrases that may be inappropriate. If there are any questions or concerns please feel free to contact the dictating provider for clarification.         Edilma King MD  08/12/22 1755

## 2022-08-12 NOTE — ACP (ADVANCE CARE PLANNING)
Advance Care Planning     Advance Care Planning Activator (Inpatient)  Conversation Note      Date of ACP Conversation: 8/12/2022     Conversation Conducted with: Patient with Decision Making Capacity    ACP Activator: Tricia Irizarry Maker:     Current Designated Health Care Decision Maker:     Primary Decision Maker: Светлана Nur Partner - [phone]    Today we documented Decision Maker(s) consistent with ACP documents on file. Care Preferences    Ventilation: \"If you were in your present state of health and suddenly became very ill and were unable to breathe on your own, what would your preference be about the use of a ventilator (breathing machine) if it were available to you? \"      Would the patient desire the use of ventilator (breathing machine)?: yes    \"If your health worsens and it becomes clear that your chance of recovery is unlikely, what would your preference be about the use of a ventilator (breathing machine) if it were available to you? \"     Would the patient desire the use of ventilator (breathing machine)?: Yes      Resuscitation  \"CPR works best to restart the heart when there is a sudden event, like a heart attack, in someone who is otherwise healthy. Unfortunately, CPR does not typically restart the heart for people who have serious health conditions or who are very sick. \"    \"In the event your heart stopped as a result of an underlying serious health condition, would you want attempts to be made to restart your heart (answer \"yes\" for attempt to resuscitate) or would you prefer a natural death (answer \"no\" for do not attempt to resuscitate)? \" yes       [x] Yes   [] No   Educated Patient / [place] [facility] regarding differences between Advance Directives and portable DNR orders.     Length of ACP Conversation in minutes:      Conversation Outcomes:  [x] ACP discussion completed  [] Existing advance directive reviewed with patient; no changes to patient's previously recorded wishes  [x] New Advance Directive completed  [] Portable Do Not Rescitate prepared for Provider review and signature  [] POLST/POST/MOLST/MOST prepared for Provider review and signature      Follow-up plan:    [] Schedule follow-up conversation to continue planning  [] Referred individual to Provider for additional questions/concerns   [] Advised patient/agent/surrogate to review completed ACP document and update if needed with changes in condition, patient preferences or care setting    [x] This note routed to one or more involved healthcare providers

## 2022-08-12 NOTE — TELEPHONE ENCOUNTER
Writer contacted ED provider, Oswaldo Stock , to inform of 30-day readmission risk via phone . ED provider informed writer of readmission. Callback: If you need to callback to inform of disposition, please call [phone].

## 2022-08-12 NOTE — ED PROVIDER NOTES
[address]  [address] [address]  Dept: [phone]  [facility]    Chief Complaint   Patient presents with    Fatigue     Pt fell this am getting out of bed. Pt denies dizziness or lightheadedness. Pt staets her legs are weak and has difficulty bending knees which she sates led to her fall. Did not hit head. No LOC       HPI    Mari Croft is a 74 y.o. female who presents to the emergency department with ongoing weakness and difficulty transferring herself now. The patient has a history of metastatic non-small cell lung cancer being followed by Dr. Benton Renee it is stage IV. She is receiving palliative radiation to her legs. She is also receiving chemotherapy which she last had about a week and a half ago. She states she developed a PE and is taking Eliquis. She states the swelling in her legs have gotten much worse and it is going up into her abdomen and lower back. She states her legs are so swollen she can hardly bend her knees and she is having difficulty standing and sitting. Today she fell. She did not strike her head. She denies loss of consciousness. She denies headache. She denies back pain. She denies extremity pain. REVIEW OF SYSTEMS    Cardiac: No chest pain or palpitations  Respiratory: + shortness of breath or new cough  General: see HPI, no fevers   GI: No abdominal pain or diarrhea, nausea, vomiting  Neuro: see HPI, no LOC  All other systems reviewed and are negative.     PAST MEDICAL & SURGICAL HISTORY    Past Medical History:   Diagnosis Date    Headache      Past Surgical History:   Procedure Laterality Date    APPENDECTOMY      PORT SURGERY N/A 7/21/2022    PORT A CATHETER PLACEMENT WITH FLUOROSCOPY performed by Estella Nino MD at [address] [place] [address]    Current Outpatient Rx Medication Sig Dispense Refill    oxyCODONE-acetaminophen (PERCOCET) 5-325 MG per tablet Take 1 tablet by mouth every 4 hours as needed for Pain. ondansetron (ZOFRAN) 8 MG tablet Take 8 mg by mouth every 8 hours as needed for Nausea or Vomiting      prochlorperazine (COMPAZINE) 10 MG tablet Take 10 mg by mouth every 6 hours as needed      morphine (MS CONTIN) 15 MG extended release tablet Take 1 tablet by mouth in the morning and at bedtime. 0    naloxegol (MOVANTIK) 12.5 MG TABS tablet Take 1 tablet by mouth every morning (before breakfast) (Patient not taking: Reported on 8/12/2022) 30 tablet 0    apixaban (ELIQUIS) 5 MG TABS tablet Take 10 mg twice a day for 7 more doses (3.5 days), then take 5 mg twice a day (Patient taking differently: Take 5 mg by mouth in the morning and 5 mg before bedtime. Take 10 mg twice a day for 7 more doses (3.5 days), then take 5 mg twice a day.) 60 tablet 2    lidocaine 4 % external patch Place 1 patch onto the skin in the morning. 6 patch 1    folic acid (FOLVITE) 1 MG tablet Take 1 tablet by mouth in the morning. 30 tablet 3    sennosides-docusate sodium (SENOKOT-S) 8.6-50 MG tablet Take 1 tablet by mouth in the morning.  30 tablet 0    Melatonin 10 MG TABS Take 10 mg by mouth at bedtime      SUMAtriptan (IMITREX) 100 MG tablet Take 1 tablet by mouth daily as needed for Migraine 9 tablet 11       ALLERGIES    No Known Allergies    SOCIAL & FAMILY HISTORY  Social History     Socioeconomic History    Marital status: Single   Occupational History    Occupation: health tech   Tobacco Use    Smoking status: Every Day     Packs/day: 1.00     Years: 50.00     Pack years: 50.00     Types: Cigarettes    Smokeless tobacco: Never   Substance and Sexual Activity    Alcohol use: No    Drug use: No     Family History   Problem Relation Age of Onset    Arthritis Mother     High Blood Pressure Mother        PHYSICAL EXAM    VITAL SIGNS: BP (!) 93/55   Pulse 87   Temp 97.9 °F (36.6 °C) (Oral)   Resp 17   SpO2 97%   Constitutional:  Well developed, chronically ill appearing  Eyes:  Pupils equally round and reactive to light sclera nonicteric  HENT:  External ears normal, nose normal  NECK: Normal range of motion, no tenderness  Respiratory:  Lungs clear to auscultation bilaterally, no respiratory distress, no wheezing   Cardiovascular:  Regular rate, normal rhythm, no murmurs   GI:  Soft, diffuse abdominal tenderness, anasarca. Enlarged nodular liver extending into RLQ  Musculoskeletal:  4+ pitting edema from lower abdomen, mons pubis, low back down to toes bilaterally   Integument:  Skin is warm and dry, poor turgor. Radiation markings to lower extremities. Right lower leg is with erythema  Vascular: Radial pulses 2+. Pedal pulses are palpable but they are diminished. Sluggish refill to the toes  Neurologic: Awake, alert, oriented x3, no aphasia, no slurred speech, CN II-XII intact    EKG    Reviewed by myself and interpreted by my attending physician Dr. Armaan Faustin. Ventricular rate 90 bpm, NM interval 144 ms, QRS duration 80 ms,  ms  No ST elevation or depression. Interpretation is normal sinus      RADIOLOGY/PROCEDURES    XR CHEST PORTABLE   Final Result   Stable left upper lobe mass.   No acute cardiopulmonary findings         CT CHEST ABDOMEN PELVIS W CONTRAST    (Results Pending)     Labs Reviewed   CBC WITH AUTO DIFFERENTIAL - Abnormal; Notable for the following components:       Result Value    RBC 3.69 (*)     Hemoglobin 11.1 (*)     Hematocrit 33.0 (*)     RDW 17.4 (*)     Lymphocytes Absolute 0.9 (*)     All other components within normal limits   BRAIN NATRIURETIC PEPTIDE - Abnormal; Notable for the following components:    Pro- (*)     All other components within normal limits   BASIC METABOLIC PANEL W/ REFLEX TO MG FOR LOW K - Abnormal; Notable for the following components:    Sodium 123 (*)     Chloride 84 (*)     BUN 23 (*)     GFR Non- 55 (*) 4.3, chloride 84, CO2 23, BUN 23, creatinine 1    Lactic acid is 3  She was not given fluids per sepsis protocol due to the anasarca and basically 4+ pitting edema from her mid abdomen all the way down to her toes    proBNP 860, troponin less than 0.01. Albumin 2.3  Alk phos 790  ALT 44, , bilirubin 1.4  Direct and indirect 0.7  Total protein 5.9    COVID is negative    XR CHEST PORTABLE (Final result)  Result time 08/12/22 12:57:17  Final result by Cayla Hernandez (08/12/22 12:57:17)                Impression:    Stable left upper lobe mass. No acute cardiopulmonary findings               Perfect serve to the hospitalist for admission. Perfect serve to Dr. Margarita Hernandez for consultation    Dr. Margarita Hernandez evaluated the patient while she was in the emergency department we will add:    Cefepime for right leg cellulitis  CT of the chest abdomen and pelvis added on per Dr. Sid Arita request to evaluate the IVC clot    I notified the hospitalist service for these new orders as the patient is admitted to their service now    Urinalysis is pending at the time of this dictation. 1715 CT of the abdomen and pelvis  Impression   CHEST:       1. Resolution of the previously identified pulmonary embolism within the   right inter lobar pulmonary artery. 2. Large left upper lobe mass stable to slightly increased in size from   07/19/2022. 3. 6 mm nodule in the medial right lung base significantly decreased in size   from the previous exam.  Several other smaller unchanged scattered bilateral   pulmonary nodules. 4. Moderate emphysematous changes. ABDOMEN AND PELVIS:       1. Filling defect again seen within the inferior vena cava extending to the   confluence of the common iliac veins. This is stable to mildly decreased in   size from the previous study. This may represent a bland thrombus or tumor   thrombus. 2. Extensive hepatic metastases without significant change.    3. Retroperitoneal lymphadenopathy mildly worsened from the previous study. 4. Unchanged thickening of the bilateral adrenal glands suspicious for   metastatic involvement. 5. Aggressive lytic lesion in the left acetabulum without significant change   suspicious for a metastatic lesion. Dr. Suzy Snowden and Dr. Summer Nieves made aware of CT results. CRITICAL CARE NOTE:  There was a high probability of clinically significant life-threatening deterioration of the patient's condition requiring my urgent intervention. Total critical care time is 10 minutes. This includes multiple reevaluations, vital sign monitoring, pulse oximetry monitoring, telemetry monitoring, clinical response to the IV medications, reviewing the nursing notes, consultation time, dictation/documentation time, and interpretation of the labwork. (This time excludes time spent performing procedures). FINAL IMPRESSION    1. Anasarca    2. Dehydration    3. General weakness    4. Fall from bed, initial encounter    5. Hyponatremia    6.  Cellulitis of right lower extremity        PLAN  Admission    (Please note that this note was completed with a voice recognition program.  Every attempt was made to edit the dictations, but inevitably there remain words that are mis-transcribed.)           LISET Flores CNP  08/12/22 Serena Ruiz, APRN - CNP  08/12/22 0393

## 2022-08-12 NOTE — ED PROVIDER NOTES
[facility] an [facility] but was asked to review her EKG. EKG interpreted by myself.    Rate: normal  Rhythm: NSR  Axis: normal  Intervals: within normal limits  ST Segments: no acute abnormality  T waves: no acute abnormality  Comparison: Compared to 6/23/22, there are T wave inversions of the anterolateral leads  Impression: NSR with anterolateral T wave abnormality     Duc Templeton MD  08/12/22 [id number]

## 2022-08-13 LAB
ANION GAP SERPL CALCULATED.3IONS-SCNC: 17 MMOL/L (ref 3–16)
BASOPHILS ABSOLUTE: 0.1 K/UL (ref 0–0.2)
BASOPHILS RELATIVE PERCENT: 0.8 %
BUN BLDV-MCNC: 24 MG/DL (ref 7–20)
CALCIUM SERPL-MCNC: 9 MG/DL (ref 8.3–10.6)
CHLORIDE BLD-SCNC: 83 MMOL/L (ref 99–110)
CO2: 23 MMOL/L (ref 21–32)
CREAT SERPL-MCNC: 1.3 MG/DL (ref 0.6–1.2)
EOSINOPHILS ABSOLUTE: 0 K/UL (ref 0–0.6)
EOSINOPHILS RELATIVE PERCENT: 0.1 %
GFR AFRICAN AMERICAN: 49
GFR NON-AFRICAN AMERICAN: 41
GLUCOSE BLD-MCNC: 66 MG/DL (ref 70–99)
HCT VFR BLD CALC: 33.4 % (ref 36–48)
HEMOGLOBIN: 11 G/DL (ref 12–16)
LYMPHOCYTES ABSOLUTE: 1.4 K/UL (ref 1–5.1)
LYMPHOCYTES RELATIVE PERCENT: 20.1 %
MCH RBC QN AUTO: 29.9 PG (ref 26–34)
MCHC RBC AUTO-ENTMCNC: 33.1 G/DL (ref 31–36)
MCV RBC AUTO: 90.5 FL (ref 80–100)
MONOCYTES ABSOLUTE: 0.4 K/UL (ref 0–1.3)
MONOCYTES RELATIVE PERCENT: 5.3 %
NEUTROPHILS ABSOLUTE: 5 K/UL (ref 1.7–7.7)
NEUTROPHILS RELATIVE PERCENT: 73.7 %
OSMOLALITY URINE: 382 MOSM/KG (ref 390–1070)
OSMOLALITY: 273 MOSM/KG (ref 280–301)
PDW BLD-RTO: 17.2 % (ref 12.4–15.4)
PLATELET # BLD: 214 K/UL (ref 135–450)
PMV BLD AUTO: 7.5 FL (ref 5–10.5)
POTASSIUM REFLEX MAGNESIUM: 4.9 MMOL/L (ref 3.5–5.1)
RBC # BLD: 3.69 M/UL (ref 4–5.2)
SODIUM BLD-SCNC: 123 MMOL/L (ref 136–145)
SODIUM URINE: <20 MMOL/L
URIC ACID, SERUM: 14.9 MG/DL (ref 2.6–6)
WBC # BLD: 6.8 K/UL (ref 4–11)

## 2022-08-13 PROCEDURE — 6370000000 HC RX 637 (ALT 250 FOR IP): Performed by: INTERNAL MEDICINE

## 2022-08-13 PROCEDURE — 80048 BASIC METABOLIC PNL TOTAL CA: CPT

## 2022-08-13 PROCEDURE — 94760 N-INVAS EAR/PLS OXIMETRY 1: CPT

## 2022-08-13 PROCEDURE — 6360000002 HC RX W HCPCS: Performed by: INTERNAL MEDICINE

## 2022-08-13 PROCEDURE — 97530 THERAPEUTIC ACTIVITIES: CPT

## 2022-08-13 PROCEDURE — 97166 OT EVAL MOD COMPLEX 45 MIN: CPT

## 2022-08-13 PROCEDURE — 97530 THERAPEUTIC ACTIVITIES: CPT | Performed by: PHYSICAL THERAPIST

## 2022-08-13 PROCEDURE — 36415 COLL VENOUS BLD VENIPUNCTURE: CPT

## 2022-08-13 PROCEDURE — 83930 ASSAY OF BLOOD OSMOLALITY: CPT

## 2022-08-13 PROCEDURE — 97535 SELF CARE MNGMENT TRAINING: CPT

## 2022-08-13 PROCEDURE — P9047 ALBUMIN (HUMAN), 25%, 50ML: HCPCS | Performed by: INTERNAL MEDICINE

## 2022-08-13 PROCEDURE — 2580000003 HC RX 258: Performed by: INTERNAL MEDICINE

## 2022-08-13 PROCEDURE — 84550 ASSAY OF BLOOD/URIC ACID: CPT

## 2022-08-13 PROCEDURE — 51798 US URINE CAPACITY MEASURE: CPT

## 2022-08-13 PROCEDURE — 85025 COMPLETE CBC W/AUTO DIFF WBC: CPT

## 2022-08-13 PROCEDURE — 2060000000 HC ICU INTERMEDIATE R&B

## 2022-08-13 PROCEDURE — 97163 PT EVAL HIGH COMPLEX 45 MIN: CPT | Performed by: PHYSICAL THERAPIST

## 2022-08-13 PROCEDURE — 6370000000 HC RX 637 (ALT 250 FOR IP): Performed by: NURSE PRACTITIONER

## 2022-08-13 RX ORDER — ALBUMIN (HUMAN) 12.5 G/50ML
25 SOLUTION INTRAVENOUS EVERY 8 HOURS
Status: COMPLETED | OUTPATIENT
Start: 2022-08-13 | End: 2022-08-16

## 2022-08-13 RX ORDER — MIDODRINE HYDROCHLORIDE 5 MG/1
5 TABLET ORAL
Status: DISCONTINUED | OUTPATIENT
Start: 2022-08-13 | End: 2022-08-14

## 2022-08-13 RX ADMIN — NALOXEGOL OXALATE 12.5 MG: 12.5 TABLET, FILM COATED ORAL at 11:16

## 2022-08-13 RX ADMIN — CEFEPIME 2000 MG: 2 INJECTION, POWDER, FOR SOLUTION INTRAVENOUS at 05:07

## 2022-08-13 RX ADMIN — OXYCODONE 5 MG: 5 TABLET ORAL at 06:38

## 2022-08-13 RX ADMIN — MORPHINE SULFATE 15 MG: 15 TABLET, FILM COATED, EXTENDED RELEASE ORAL at 22:40

## 2022-08-13 RX ADMIN — SENNOSIDES AND DOCUSATE SODIUM 1 TABLET: 50; 8.6 TABLET ORAL at 08:24

## 2022-08-13 RX ADMIN — FUROSEMIDE 2 MG/HR: 10 INJECTION, SOLUTION INTRAMUSCULAR; INTRAVENOUS at 15:23

## 2022-08-13 RX ADMIN — CEFEPIME 2000 MG: 2 INJECTION, POWDER, FOR SOLUTION INTRAVENOUS at 17:12

## 2022-08-13 RX ADMIN — MIDODRINE HYDROCHLORIDE 5 MG: 5 TABLET ORAL at 17:15

## 2022-08-13 RX ADMIN — Medication 9 MG: at 22:40

## 2022-08-13 RX ADMIN — ALBUMIN (HUMAN) 25 G: 0.25 INJECTION, SOLUTION INTRAVENOUS at 14:12

## 2022-08-13 RX ADMIN — SODIUM CHLORIDE, PRESERVATIVE FREE 10 ML: 5 INJECTION INTRAVENOUS at 14:16

## 2022-08-13 RX ADMIN — APIXABAN 5 MG: 5 TABLET, FILM COATED ORAL at 22:39

## 2022-08-13 RX ADMIN — MIDODRINE HYDROCHLORIDE 5 MG: 5 TABLET ORAL at 14:15

## 2022-08-13 RX ADMIN — APIXABAN 5 MG: 5 TABLET, FILM COATED ORAL at 08:24

## 2022-08-13 RX ADMIN — ALBUMIN (HUMAN) 25 G: 0.25 INJECTION, SOLUTION INTRAVENOUS at 23:04

## 2022-08-13 RX ADMIN — ONDANSETRON 4 MG: 2 INJECTION INTRAMUSCULAR; INTRAVENOUS at 17:10

## 2022-08-13 RX ADMIN — FOLIC ACID 1 MG: 1 TABLET ORAL at 08:25

## 2022-08-13 ASSESSMENT — PAIN DESCRIPTION - ORIENTATION
ORIENTATION_2: RIGHT
ORIENTATION: ANTERIOR;RIGHT

## 2022-08-13 ASSESSMENT — PAIN SCALES - WONG BAKER
WONGBAKER_NUMERICALRESPONSE: 0

## 2022-08-13 ASSESSMENT — PAIN SCALES - GENERAL
PAINLEVEL_OUTOF10: 6
PAINLEVEL_OUTOF10: 9
PAINLEVEL_OUTOF10: 7

## 2022-08-13 ASSESSMENT — PAIN DESCRIPTION - DESCRIPTORS
DESCRIPTORS_2: SHARP;STABBING
DESCRIPTORS: PRESSURE;ACHING

## 2022-08-13 ASSESSMENT — PAIN DESCRIPTION - PAIN TYPE: TYPE: ACUTE PAIN;CHRONIC PAIN

## 2022-08-13 ASSESSMENT — PAIN - FUNCTIONAL ASSESSMENT: PAIN_FUNCTIONAL_ASSESSMENT: PREVENTS OR INTERFERES WITH MANY ACTIVE NOT PASSIVE ACTIVITIES

## 2022-08-13 ASSESSMENT — PAIN DESCRIPTION - ONSET: ONSET: ON-GOING

## 2022-08-13 ASSESSMENT — PAIN DESCRIPTION - FREQUENCY: FREQUENCY: CONTINUOUS

## 2022-08-13 ASSESSMENT — PAIN DESCRIPTION - LOCATION
LOCATION: ABDOMEN
LOCATION_2: LEG
LOCATION: ABDOMEN

## 2022-08-13 ASSESSMENT — PAIN DESCRIPTION - INTENSITY: RATING_2: 8

## 2022-08-13 NOTE — PROGRESS NOTES
Dr Yi Kind  called  pt is  pcu  do not transfer to [facility] ,  will come and see pt

## 2022-08-13 NOTE — CARE COORDINATION
08/13/22 1016   Readmission Assessment   Number of Days since last admission? 8-30 days   Previous Disposition Home with Home Health  ([facility])   Who is being Jg Montague   (Per chart review)   What was the patient's/caregiver's perception as to why they think they needed to return back to the hospital? Other (Comment)  (Fall from bed. Dehydration, Anasarca, Hyponatremia, General weakness, Generalized weakness, Cellulitis of right lower extremity.)   Did you visit your Primary Care Physician after you left the hospital, before you returned this time? Yes   Did you see a specialist, such as Cardiac, Pulmonary, Orthopedic Physician, etc. after you left the hospital? No   Who advised the patient to return to the hospital? Self-referral   Does the patient report anything that got in the way of taking their medications? No   In our efforts to provide the best possible care to you and others like you, can you think of anything that we could have done to help you after you left the hospital the first time, so that you might not have needed to return so soon?  Other (Comment)  (n/a)

## 2022-08-13 NOTE — PLAN OF CARE
Problem: Pain  Goal: Verbalizes/displays adequate comfort level or baseline comfort level  Outcome: Progressing     Problem: ABCDS Injury Assessment  Goal: Absence of physical injury  Outcome: Progressing  Flowsheets (Taken 8/12/2022 2000)  Absence of Physical Injury: Implement safety measures based on patient assessment     Problem: Skin/Tissue Integrity  Goal: Absence of new skin breakdown  Description: 1. Monitor for areas of redness and/or skin breakdown  2. Assess vascular access sites hourly  3. Every 4-6 hours minimum:  Change oxygen saturation probe site  4. Every 4-6 hours:  If on nasal continuous positive airway pressure, respiratory therapy assess nares and determine need for appliance change or resting period.   Outcome: Progressing     Problem: Safety - Adult  Goal: Free from fall injury  Outcome: Progressing

## 2022-08-13 NOTE — PROGRESS NOTES
Hospitalist Progress Note      PCP: Josh Mendez MD    Date of Admission: 8/12/2022    Chief Complaint: nausea/vomiting    Hospital Course:      Subjective: nauseous and vomiting in the room       Medications:  Reviewed    Infusion Medications    sodium chloride       Scheduled Medications    sodium chloride flush  10 mL IntraVENous 2 times per day    cefepime  2,000 mg IntraVENous Once    morphine  15 mg Oral BID    melatonin  9 mg Oral Nightly    apixaban  5 mg Oral BID    folic acid  1 mg Oral Daily    naloxegol  12.5 mg Oral QAM AC    sennosides-docusate sodium  1 tablet Oral Daily    cefepime  2,000 mg IntraVENous Q12H     PRN Meds: sodium chloride flush, sodium chloride, potassium chloride **OR** potassium alternative oral replacement **OR** potassium chloride, potassium chloride, magnesium sulfate, promethazine **OR** ondansetron, magnesium hydroxide, iopamidol, oxyCODONE, oxyCODONE-acetaminophen, SUMAtriptan      Intake/Output Summary (Last 24 hours) at 8/13/2022 0848  Last data filed at 8/13/2022 0753  Gross per 24 hour   Intake 240 ml   Output 200 ml   Net 40 ml       Exam:    BP (!) 82/55   Pulse 90   Temp 97.9 °F (36.6 °C) (Oral)   Resp 18   Ht 5' 7\" (1.702 m)   Wt 158 lb 11.7 oz (72 kg)   SpO2 94%   BMI 24.86 kg/m²     General appearance: No apparent distress, appears stated age and cooperative. HEENT: Pupils equal, round, and reactive to light. Conjunctivae/corneas clear. Neck: Supple, with full range of motion. No jugular venous distention. Trachea midline. Respiratory:  Normal respiratory effort. Clear to auscultation, bilaterally without Rales/Wheezes/Rhonchi. Cardiovascular: Regular rate and rhythm with normal S1/S2 without murmurs, rubs or gallops. Abdomen: Soft, non-tender, non-distended with normal bowel sounds. Musculoskeletal: No clubbing, cyanosis or edema bilaterally. Full range of motion without deformity. Skin: Diffusely anasarcic.   Lower legs with distal erythema and mild induration  Neurologic:  Neurovascularly intact without any focal sensory/motor deficits. Cranial nerves: II-XII intact, grossly non-focal.  Psychiatric: Alert and oriented, thought content appropriate, normal insight  Capillary Refill: Brisk,< 3 seconds   Peripheral Pulses: +2 palpable, equal bilaterally       Labs:   Recent Labs     08/12/22  1240 08/13/22  0608   WBC 6.1 6.8   HGB 11.1* 11.0*   HCT 33.0* 33.4*    214     Recent Labs     08/12/22  1240 08/13/22  0608   * 123*   K 4.3 4.9   CL 84* 83*   CO2 23 23   BUN 23* 24*   CREATININE 1.0 1.3*   CALCIUM 8.7 9.0     Recent Labs     08/12/22  1240   *   ALT 44*   BILIDIR 0.7*   BILITOT 1.4*   ALKPHOS 790*     Recent Labs     08/12/22  1240   INR 3.04*     Recent Labs     08/12/22  1240   TROPONINI <0.01       Urinalysis:      Lab Results   Component Value Date/Time    NITRU Negative 08/12/2022 12:40 PM    WBCUA 3 08/12/2022 12:40 PM    BACTERIA None Seen 08/12/2022 12:40 PM    RBCUA 4 08/12/2022 12:40 PM    BLOODU Negative 08/12/2022 12:40 PM    SPECGRAV 1.031 08/12/2022 12:40 PM    GLUCOSEU Negative 08/12/2022 12:40 PM       Radiology:  CT CHEST ABDOMEN PELVIS W CONTRAST   Final Result   CHEST:      1. Resolution of the previously identified pulmonary embolism within the   right inter lobar pulmonary artery. 2. Large left upper lobe mass stable to slightly increased in size from   07/19/2022. 3. 6 mm nodule in the medial right lung base significantly decreased in size   from the previous exam.  Several other smaller unchanged scattered bilateral   pulmonary nodules. 4. Moderate emphysematous changes. ABDOMEN AND PELVIS:      1. Filling defect again seen within the inferior vena cava extending to the   confluence of the common iliac veins. This is stable to mildly decreased in   size from the previous study. This may represent a bland thrombus or tumor   thrombus.    2. Extensive hepatic metastases without significant change. 3. Retroperitoneal lymphadenopathy mildly worsened from the previous study. 4. Unchanged thickening of the bilateral adrenal glands suspicious for   metastatic involvement. 5. Aggressive lytic lesion in the left acetabulum without significant change   suspicious for a metastatic lesion. XR CHEST PORTABLE   Final Result   Stable left upper lobe mass. No acute cardiopulmonary findings                 Assessment/Plan:    Active Hospital Problems    Diagnosis Date Noted    Generalized weakness [R53.1] 08/12/2022     Priority: Medium       Cellulitis  - cont cefepime  - f/u MRSA swab    Anasarca  Hyponatremia  Hypotension  - uncertain the cause,Echo 1 month ago with normal EF and diastolic function  - oncology does not think is due to immunotherapy  - nephro consulted  - lasix drip with albumin  - midodrine    Transaminitis likely secondary to hepatic metastasis    Recent PE on Eliquis    Stage 4 non-small cell lung cancer  - oncology consulted --> holding chemo   - has discussed goals of care in the past and patient declining hospice      DVT Prophylaxis: Eliquis  Diet: ADULT DIET;  Regular; 1500 ml  Code Status: Full Code    PT/OT Eval Status: ordered    Lazaro Esquivel MD

## 2022-08-13 NOTE — PROGRESS NOTES
Physical Therapy  Facility/Department: [facility] PROGRESSIVE CARE  Physical Therapy Initial Assessment    Name: Luci Méndez  : 1955  MRN: [id number]  Date of Service: 2022    Discharge Recommendations:  Patient would benefit from continued therapy after discharge (3-5x/wk)   PT Equipment Recommendations  Other: if weakness persists may need a w/c or transport chair    Luci Méndez scored a 10/24 on the AM-PAC short mobility form. Current research shows that an AM-PAC score of 17 or less is typically not associated with a discharge to the patient's home setting. Based on the patient's AM-PAC score and their current functional mobility deficits, it is recommended that the patient have 3-5 sessions per week of Physical Therapy at d/c to increase the patient's independence. Please see assessment section for further patient specific details. If patient discharges prior to next session this note will serve as a discharge summary. Please see below for the latest assessment towards goals. Patient Diagnosis(es): The primary encounter diagnosis was Anasarca. Diagnoses of Dehydration, General weakness, Fall from bed, initial encounter, Hyponatremia, and Cellulitis of right lower extremity were also pertinent to this visit. Past Medical History:  has a past medical history of Headache. Past Surgical History:  has a past surgical history that includes Appendectomy; Tonsillectomy; and Port Surgery (N/A, 2022). Assessment   Body Structures, Functions, Activity Limitations Requiring Skilled Therapeutic Intervention: Decreased functional mobility ; Decreased strength;Decreased endurance  Assessment: Pt is a 73 yo female who has  past medical history of non-small cell lung cancer hepatic metastasis and recent diagnosis of right-sided pulmonary embolism on Eliquis who presented to hospital who presented to hospital for generalized weakness fatigue.  Pt is below her baseline and currently needing assist of 2 for bed mob and transfers using a husam stedy lift; pt's BP running low which also contributes to her weakness; pt would benefit from continued therapy 3-5x/wk to address deficits to allow pt to regain her Ind; if pt returns home prior to being able to safely amb, may need a w/c if daughter able to physically assist?  Therapy Prognosis: Fair  Decision Making: High Complexity  Barriers to Learning: fatigue  Requires PT Follow-Up: Yes  Activity Tolerance  Activity Tolerance: Patient limited by endurance; Patient limited by fatigue  Activity Tolerance Comments: limited by overall weakness     Plan   Plan  Plan: 3-5 times per week  Current Treatment Recommendations: Functional mobility training, Transfer training, Balance training, Gait training, Strengthening, Endurance training  Safety Devices  Type of Devices: Call light within reach, Nurse notified, Chair alarm in place, Left in bed, Gait belt, Bed alarm in place  Restraints  Restraints Initially in Place: No     Restrictions  Restrictions/Precautions  Restrictions/Precautions: Fall Risk     Subjective   General  Chart Reviewed: Yes  Patient assessed for rehabilitation services?: Yes  Additional Pertinent Hx: Patient is a 78-year-old female with past medical history of non-small cell lung cancer hepatic metastasis and recent diagnosis of right-sided pulmonary embolism on Eliquis who presented to hospital who presented to hospital for generalized weakness fatigue. Patient mentions she does not feel well. Patient reports falling today.   Response To Previous Treatment: Not applicable  Family / Caregiver Present: No  Follows Commands: Within Functional Limits  Subjective  Subjective: pt visably fatigued but willing to particpate         Social/Functional History  Social/Functional History  Lives With: Spouse, Daughter  Type of Home: House  Home Layout: Two level, Bed/Bath upstairs, Able to Live on Main level with bedroom/bathroom (pt has been sleeping on 1st floor)  Home Access: Stairs to enter with rails  Entrance Stairs - Number of Steps: 4  Entrance Stairs - Rails: Right  Bathroom Shower/Tub: Tub/Shower unit  Bathroom Toilet: Standard (raised toilet seat with rails)  Bathroom Equipment: 3-in-1 commode, Tub transfer bench  Home Equipment: Sujata Leon, rolling  ADL Assistance: Independent  Homemaking Assistance: Independent  Ambulation Assistance: Independent (most recently using cane)  Transfer Assistance: Independent  Active : Yes  Occupation: Full time employment  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[place] [address] Street: Within [place] [id number]  Hearing: Within functional limits    Cognition   Orientation  Overall Orientation Status: Within Functional Limits  Cognition  Overall Cognitive Status: WFL     Objective   Heart Rate: 93  Heart Rate Source: Monitor  BP: (!) 87/55  BP Location: Left upper arm  BP Method: Automatic  Patient Position: Turns self;Semi fowlers  MAP (Calculated): 65.67  Resp: 20  SpO2: 94 %  O2 Device: None (Room air)              AROM RLE (degrees)  RLE AROM: WFL  AROM LLE (degrees)  LLE AROM : WFL  Strength RLE  Comment: functionally poor  Strength LLE  Comment: functionally poor        Bed Mobility Training  Bed Mobility Training: Yes  Overall Level of Assistance: Moderate assistance  Supine to Sit: Moderate assistance  Sit to Supine: Assist X2;Maximum assistance  Balance  Sitting: Intact  Standing: With support (stedy; RW)  Transfer Training  Transfer Training: Yes  Overall Level of Assistance: Minimum assistance;Assist X2;Moderate assistance; Total assistance (Min A x1 with stedy and Min/Mod Ax2 to/from RW)  Sit to Stand: Moderate assistance;Minimum assistance;Assist X2;Total assistance (stedy; RW; completed x3 stands with stedy and x1 with RW)  Stand to Sit: Moderate assistance;Minimum assistance;Assist X2;Total assistance  Toilet Transfer: Minimum assistance; Total assistance (stedy to bedside commode; used due to low BP)  Gait  Overall Level of Assistance:  (JEAN MARIE due to weakness and low BP)  Bed mobility  Supine to Sit: Moderate assistance  Sit to Supine: 2 Person assistance;Maximum assistance  Transfers  Sit to Stand: Minimal Assistance; Moderate Assistance;2 Person Assistance (min A for transfers with husam mcintyredy lift from EOB and min/mod A of 2 with walker in front)  Stand to sit: Minimal Assistance; Moderate Assistance  Bed to Chair: Dependent/Total (transferred to [facility] with stedy)  Comment: pt only able to stand for a few seconds with walker   Pt's BP low during session     Balance  Comments: CGA/SBA for sitting balance; only able to stand briefly (a few seconds) with walker or stedy       Pt very fatigue and opted to return to bed after using BSC; needed total assist for cleansing and management of brief; will reapplied    OutComes Score                                                  AM-PAC Score  AM-PAC Inpatient Mobility Raw Score : 10 (08/13/22 0751)  AM-PAC Inpatient T-Scale Score : 32.29 (08/13/22 0751)  Mobility Inpatient CMS 0-100% Score: 76.75 (08/13/22 0751)  Mobility Inpatient CMS G-Code Modifier : CL (08/13/22 0751)          Tinneti Score       Goals  Short Term Goals  Time Frame for Short term goals: by discharge  Short term goal 1: bed mob SBA  Short term goal 2: trasnfers SBA  Short term goal 3: amb 25-50' with RW SBA  Patient Goals   Patient goals : to get stronger       Education  Patient Education  Education Given To: Patient  Education Provided: Role of Therapy  Education Method: Verbal  Education Outcome: Verbalized understanding      Therapy Time   Individual Concurrent Group Co-treatment   Time In 0714         Time Out 0752         Minutes 45                 ALEXSANDRA OJHNSON PT   Electronically signed by ALEXSANDRA JOHNSON PT on 8/13/2022 at 7:52 AM

## 2022-08-13 NOTE — PROGRESS NOTES
[facility]  Oncology Hematology Care  Progress Note      SUBJECTIVE:   Pt of dr Greta Martinez who has had one dose of chemo -[place]   She was due yesterday or so and did not get it   She is in for swelling fatigue low sodium   She denies neuro symptoms  Denies pain   She is on abx for cellulitis   ROS: No fever chills sweats, no nausea, vomiting, diarrhea    OBJECTIVE      Medications    Current Facility-Administered Medications: sodium chloride flush 0.9 % injection 10 mL, 10 mL, IntraVENous, 2 times per day  sodium chloride flush 0.9 % injection 10 mL, 10 mL, IntraVENous, PRN  0.9 % sodium chloride infusion, , IntraVENous, PRN  potassium chloride (KLOR-CON M) extended release tablet 40 mEq, 40 mEq, Oral, PRN **OR** potassium bicarb-citric acid (EFFER-K) effervescent tablet 40 mEq, 40 mEq, Oral, PRN **OR** potassium chloride 10 mEq/100 mL IVPB (Peripheral Line), 10 mEq, IntraVENous, PRN  potassium chloride 10 mEq/100 mL IVPB (Peripheral Line), 10 mEq, IntraVENous, PRN  magnesium sulfate 2000 mg in 50 mL IVPB premix, 2,000 mg, IntraVENous, PRN  promethazine (PHENERGAN) tablet 12.5 mg, 12.5 mg, Oral, Q6H PRN **OR** ondansetron (ZOFRAN) injection 4 mg, 4 mg, IntraVENous, Q6H PRN  magnesium hydroxide (MILK OF MAGNESIA) 400 MG/5ML suspension 30 mL, 30 mL, Oral, Daily PRN  cefepime (MAXIPIME) 2000 mg IVPB minibag, 2,000 mg, IntraVENous, Once  iopamidol (ISOVUE-370) 76 % injection 75 mL, 75 mL, IntraVENous, ONCE PRN  morphine (MS CONTIN) extended release tablet 15 mg, 15 mg, Oral, BID  melatonin tablet 9 mg, 9 mg, Oral, Nightly  oxyCODONE (ROXICODONE) immediate release tablet 5 mg, 5 mg, Oral, Q4H PRN  apixaban (ELIQUIS) tablet 5 mg, 5 mg, Oral, BID  folic acid (FOLVITE) tablet 1 mg, 1 mg, Oral, Daily  naloxegol (MOVANTIK) tablet 12.5 mg, 12.5 mg, Oral, QAM AC  oxyCODONE-acetaminophen (PERCOCET) 5-325 MG per tablet 1 tablet, 1 tablet, Oral, Q4H PRN  sennosides-docusate sodium (SENOKOT-S) 8.6-50 MG tablet 1 tablet, 1 tablet, Oral, Daily  SUMAtriptan (IMITREX) tablet 100 mg, 100 mg, Oral, Daily PRN  cefepime (MAXIPIME) 2000 mg IVPB minibag, 2,000 mg, IntraVENous, Q12H  Physical    VITALS:  BP (!) 82/55   Pulse 90   Temp 97.9 °F (36.6 °C) (Oral)   Resp 18   Ht 5' 7\" (1.702 m)   Wt 158 lb 11.7 oz (72 kg)   SpO2 94%   BMI 24.86 kg/m²   TEMPERATURE:  Current - Temp: 97.9 °F (36.6 °C); Max - Temp  Av.9 °F (36.6 °C)  Min: 97.5 °F (36.4 °C)  Max: 98.5 °F (36.9 °C)  PULSE OXIMETRY RANGE: SpO2  Av.8 %  Min: 94 %  Max: 100 %  24HR INTAKE/OUTPUT:    Intake/Output Summary (Last 24 hours) at 2022 3126  Last data filed at 2022 0753  Gross per 24 hour   Intake 240 ml   Output 200 ml   Net 40 ml       CONSTITUTIONAL:  awake, alert, cooperative, no apparent distress,chronically ill  HEENT oral pharynx , no scleral icterus  HEMATOLOGIC/LYMPHATICS:  no cervical lymphadenopathy, no supraclavicular lymphadenopathy,   LUNGS:  No increased work of breathing, good air exchange, clear to auscultation bilaterally, no crackles or wheezing  CARDIOVASCULAR:  , regular rate and rhythm, normal S1 and S2, no S3 or S4, and no murmur noted  ABDOMEN:  No scars, normal bowel sounds, soft, non-distended, non-tender, no masses palpated, no hepatosplenomegally  MUSCULOSKELETAL:  There is no redness, warmth, or swelling of the joints. EXTREMETIES: + edema   NEUROLOGIC:  Awake, alert, oriented to name, place and time. Cranial nerves II-XII are grossly intact.     SKIN: lower ext right slight erythema   Chronic stasisdermatitis       Data      Recent Labs     22  1240 22  0608   WBC 6.1 6.8   HGB 11.1* 11.0*   HCT 33.0* 33.4*    214   MCV 89.5 90.5        Recent Labs     22  1240 22  0608   * 123*   K 4.3 4.9   CL 84* 83*   CO2 23 23   BUN 23* 24*   CREATININE 1.0 1.3*     Recent Labs     22  1240   *   ALT 44*   BILIDIR 0.7*   BILITOT 1.4*   ALKPHOS 790*       Magnesium:  No results found for: MG    Imaging Echo Complete    Result Date: 7/20/2022  Transthoracic Echocardiography Report (TTE)  Demographics   Patient Name      MARIAELENA Cole Host   Date of Study     07/20/2022         Gender              Female   Patient Number    [id number]         Date of Birth       1955   Visit Number      [id number]          Age                 74 year(s)   Accession Number  [id number]         Room Number         4926   Corporate ID      [id number]            Sonographer         Barron Waters CNP           Physician           Eva Liu MD  Procedure Type of Study   TTE procedure:ECHOCARDIOGRAM COMPLETE 2D W DOPPLER W COLOR. Procedure Date Date: 07/20/2022 Start: 09:11 AM Study Location: [facility] Echo Lab Technical Quality: Adequate visualization Indications:Pulmonary embolus. Patient Status: Routine Height: 67 inches Weight: 147 pounds BSA: 1.77 m2 BMI: 23.02 kg/m2 HR: 84 bpm BP: 108/57 mmHg  Conclusions   Summary  Left ventricular cavity size is normal.  Ejection fraction is visually estimated to be 55-60%. No regional wall motion abnormalities are noted. Normal diastolic function. Mildly dilated right ventricle. Normal RV systolic function  Estimated pulmonary artery systolic pressure is mildly elevated at 33 mmHg  assuming a right atrial pressure of 3 mmHg. Signature   ------------------------------------------------------------------  Electronically signed by Eva Liu MD (Interpreting  physician) on 07/20/2022 at 01:42 PM  ------------------------------------------------------------------   Findings   Left Ventricle  Left ventricular cavity size is normal.  Ejection fraction is visually estimated to be 55-60%. No regional wall motion abnormalities are noted. Normal diastolic function.    Mitral Valve  The mitral valve appears structurally normal.  No evidence of mitral regurgitation or stenosis. Left Atrium  The left atrium is normal in size. Aortic Valve  The aortic valve is structurally normal.  No evidence of aortic valve regurgitation  Aortic sclerosis with no significant stenosis. Aorta  The aortic root and ascending aorta are normal in size. Right Ventricle  Mildly dilated right ventricle. Normal RV systolic function  RV strain: RVFWSL= -5.7%   Tricuspid Valve  Tricuspid valve is structurally normal.  Mild tricuspid regurgitation. No evidence of tricuspid stenosis. Tapse=2.1   Right Atrium  The right atrial size is normal.   Pulmonic Valve  The pulmonic valve is not well visualized. There is no evidence of pulmonic valve regurgitation or stenosis. Pericardial Effusion  No pericardial effusion noted. Pleural Effusion  No pleural effusion. Miscellaneous  IVC size is normal (<2.1cm) and collapses > 50% with respiration consistent  with normal RA pressure (3mmHg). Estimated pulmonary artery systolic pressure is mildly elevated at 33 mmHg  assuming a right atrial pressure of 3 mmHg.   M-Mode/2D Measurements (cm)   LV Diastolic Dimension: 4.87 cm LV Systolic Dimension: 2.05 cm  LV Septum Diastolic: 7.10 cm  LV PW Diastolic: 8.93 cm        AO Root Dimension: 3.4 cm                                   LA Area: 14.3 cm2  LVOT: 2.3 cm                    LA volume/Index: 34.7 ml /20 ml/m2  Doppler Measurements   AV Peak Velocity: 155 cm/s     MV Peak E-Wave: 47.6 cm/s  AV Peak Gradient: 9.61 mmHg    MV Peak A-Wave: 89.5 cm/s  LVOT Peak Velocity: 138 cm/s   MV E/A Ratio: 0.53                                 MV Mean Gradient: 1 mmHg  TR Velocity:316 cm/s           MV Max PG: 3 mmHg  TR Gradient:39.94 mmHg         MV Vmax:87.2 cm/s  Estimated RAP:3 mmHg           MV VTI:19.3 cm/s  Estimated RVSP: 43 mmHg  E' Septal Velocity: 10.3 cm/s  MV Area (continuity): 5.59 cm2  E' Lateral Velocity: 11.3 cm/s MV Deceleration Time: 283 msec  PV Peak Velocity: 90 cm/s  PV Peak Gradient: 3.24 mmHg   Aortic Valve   Peak Velocity: 155 cm/s  Peak Gradient: 9.61 mmHg  Aorta   Aortic Root: 3.4 cm  Ascending Aorta: 3.8 cm  LVOT Diameter: 2.3 cm      XR CHEST PORTABLE    Result Date: 8/12/2022  EXAMINATION: ONE XRAY VIEW OF THE CHEST 8/12/2022 12:23 pm COMPARISON: Chest x-ray dated 21 July 2022 HISTORY: ORDERING SYSTEM PROVIDED HISTORY: weakness TECHNOLOGIST PROVIDED HISTORY: Reason for exam:->weakness Reason for Exam: weakness FINDINGS: Stable left upper lobe mass. No acute airspace infiltrate. No pneumothorax or pleural effusion. Stable cardiomediastinal silhouette. Stable positioning of a right-sided chest port catheter     Stable left upper lobe mass. No acute cardiopulmonary findings     XR CHEST PORTABLE    Result Date: 7/21/2022  EXAM: XR Chest, 1 View EXAM DATE/TIME: 7/21/2022 1:56 pm CLINICAL HISTORY: ORDERING SYSTEM PROVIDED  port TECHNOLOGIST PROVIDED HISTORY: Reason for exam:->port Reason for Exam: port placement TECHNIQUE: Frontal view of the chest. COMPARISON: 07/13/2022 FINDINGS: Limitations:  A suboptimal inspiration limits the study. Lungs:  Presumed mass within the left mid lung field is unchanged. Some opacity in left lower lung field is likely related to a suboptimal inspiration and otherwise the lungs appear unchanged. Pleural space:  No acute findings. No pneumothorax. Heart:  No acute findings. No cardiomegaly. Mediastinum:  No acute findings. Bones/joints:  No acute findings. Tubes, lines and devices:  Right-sided chest port has been placed via the right subclavian vein since the prior study with the tip in the upper SVC. 1.  Right-sided chest port has been placed via the right subclavian vein since the prior study with the tip in the upper SVC. No evidence of pneumothorax. 2.  Presumed mass within the left mid lung field is unchanged. 3.  Some opacity in left lower lung field is likely related to a suboptimal inspiration and otherwise the lungs appear unchanged.      CT CHEST ABDOMEN PELVIS W CONTRAST    Result Date: 8/12/2022  EXAMINATION: CT OF THE CHEST, ABDOMEN, AND PELVIS WITH CONTRAST 8/12/2022 3:15 pm TECHNIQUE: CT of the chest, abdomen and pelvis was performed with the administration of intravenous contrast. Multiplanar reformatted images are provided for review. Automated exposure control, iterative reconstruction, and/or weight based adjustment of the mA/kV was utilized to reduce the radiation dose to as low as reasonably achievable. COMPARISON: CT chest, abdomen, and pelvis 07/19/2022. Chest radiograph 08/12/2022. HISTORY: ORDERING SYSTEM PROVIDED HISTORY: anasarca worsening, look at [facility] clot TECHNOLOGIST PROVIDED HISTORY: Reason for exam:->anasarca worsening, look at IVC clot Additional Contrast?->None FINDINGS: Chest: Mediastinum: The thoracic aorta is normal in caliber with mild atherosclerosis. No acute aortic abnormality identified. The coronary arteries are unremarkable. No acute abnormality in the branch vessels of the superior mediastinum and lower neck. A right chest port terminates in the lower superior vena cava. The main pulmonary artery is normal in caliber. The previously identified pulmonary embolism in the right interlobar pulmonary artery is no longer seen. No pulmonary embolism identified within limits of the exam.  The heart is normal in size. No pericardial effusion. The mediastinal esophagus and thyroid gland are unremarkable. No lymphadenopathy or pneumomediastinum. Lungs/pleura: Occlusion of the left apicoposterior segmental bronchus of the left upper lobe secondary to the adjacent mass. Mild secretions in the right lower lobe bronchi. No pleural effusion or pneumothorax. Moderate emphysematous changes.   The left upper lobe mass abuts the major fissure and measures approximately 6.6 x 4.1 cm on image 40 series 4 stable to slightly increased in size from 07/19/2022.  6 mm nodule in the medial right lung base on image 82 (previously 9 mm on 07/19/2022). No consolidation or interlobular septal thickening. There are a few other scattered smaller and not significantly changed bilateral pulmonary nodules. Soft Tissues/Bones: Bilateral breast implants are in place. No axillary or supraclavicular lymphadenopathy identified. No acute osseous or soft tissue abnormality. No suspicious lytic or blastic osseous lesion. Abdomen/Pelvis: Organs: Extensive hepatic metastases not significantly changed from 07/19/2022. The gallbladder is unremarkable. No biliary ductal dilatation identified. The pancreas and spleen are unremarkable. Diffuse thickening of the right adrenal gland without significant change. Nodular thickening of the left adrenal gland also without significant change. Technically indeterminate 1.6 cm cystic lesion in the upper pole of the left kidney measuring mildly hyperdense to water without significant change. Subcentimeter cystic lesion in the right kidney too small to definitively characterize but likely to represent a benign cyst.  No obstructive uropathy identified. GI/Bowel: Colonic diverticulosis without evidence of acute diverticulitis. No evidence of acute appendicitis. The stomach and small bowel are normal in appearance. No obstruction or wall thickening identified. Pelvis: The urinary bladder is normal in appearance. The uterus and bilateral adnexae are unremarkable. No free fluid in the pelvis. No pelvic or inguinal lymphadenopathy. Peritoneum/Retroperitoneum: The abdominal aorta is normal in caliber with mild to moderate atherosclerotic vascular disease. There appears to be a filling defect within the inferior vena cava extending to the iliac vein confluence in the pelvis. This is stable to mildly decreased in size from 07/19/2022. Left periaortic node measuring 2.7 x 1.4 cm on axial image 60 (previously 1.9 x 1.2 cm on 07/19/2022). Multiple other enlarged retroperitoneal nodes. No mesenteric lymphadenopathy.   No ascites or pneumoperitoneum. Bones/Soft Tissues: Extensive subcutaneous edema. There is an aggressive appearing lytic lesion in the left posterosuperior acetabulum. This is grossly unchanged from the previous exam.  No acute osseous abnormality. CHEST: 1. Resolution of the previously identified pulmonary embolism within the right inter lobar pulmonary artery. 2. Large left upper lobe mass stable to slightly increased in size from 07/19/2022. 3. 6 mm nodule in the medial right lung base significantly decreased in size from the previous exam.  Several other smaller unchanged scattered bilateral pulmonary nodules. 4. Moderate emphysematous changes. ABDOMEN AND PELVIS: 1. Filling defect again seen within the inferior vena cava extending to the confluence of the common iliac veins. This is stable to mildly decreased in size from the previous study. This may represent a bland thrombus or tumor thrombus. 2. Extensive hepatic metastases without significant change. 3. Retroperitoneal lymphadenopathy mildly worsened from the previous study. 4. Unchanged thickening of the bilateral adrenal glands suspicious for metastatic involvement. 5. Aggressive lytic lesion in the left acetabulum without significant change suspicious for a metastatic lesion. CT CHEST ABDOMEN PELVIS W CONTRAST    Result Date: 7/19/2022  EXAMINATION: CT OF THE CHEST, ABDOMEN, AND PELVIS WITH CONTRAST 7/19/2022 4:04 pm TECHNIQUE: CT of the chest, abdomen and pelvis was performed with the administration of 75 mL Isovue 370 intravenous contrast. Multiplanar reformatted images are provided for review. Automated exposure control, iterative reconstruction, and/or weight based adjustment of the mA/kV was utilized to reduce the radiation dose to as low as reasonably achievable. COMPARISON: Chest radiograph 07/13/2022 HISTORY: Acute abdominal pain and shortness of breath. Recently diagnosed lung cancer.  FINDINGS: Chest: Mediastinum: Large filling defect in the right interlobar pulmonary artery with possible smaller filling defects in branch vessels. Heart is not enlarged but there is left ventricular hypertrophy. No pericardial effusion. Thoracic aorta is normal caliber. No pathologically enlarged lymph nodes. Thyroid and esophagus are unremarkable. Lungs/pleura: Moderate upper lobe predominant emphysema. Large left upper lobe mass abutting the fissure and extending to the hilum measures 6.0 x 4.2 cm. A nodule in the medial right lower lobe measures 1.0 x 0.8 cm. Additional scattered tiny nodules noted. No consolidation or pleural effusion. Soft Tissues/Bones: Bilateral saline breast implants. No suspicious osseous lesions. Scattered degenerative changes of the thoracic spine. Abdomen/Pelvis: Organs: Diffuse hepatic metastatic disease with a large confluent mass in the central right hepatic lobe. Adrenals are thickened, right greater than left. Small left renal cyst.  Remaining solid organs and the gallbladder are unremarkable. GI/Bowel: No acute abnormality. Appendix has been removed. Pelvis: Bladder is not well distended. Uterus is unremarkable. No lymphadenopathy or free fluid. Peritoneum/Retroperitoneum: Tumor thrombus extends into the intrahepatic IVC and common iliac veins with possible extension into the external iliac veins; no definite extension into the renal veins. No ascites. Prominent retrocrural, right upper quadrant, and retroperitoneal lymph nodes. Extensive atherosclerotic disease of the aorta without aneurysm. Incidental retroaortic left renal vein. Bones/Soft Tissues: No suspicious osseous lesions. 1. Large left upper lobe mass abutting the fissure and extending to the hilum measuring 6.0 x 4.2 cm is consistent with known malignancy. 2. Large filling defect in the right interlobar pulmonary artery consistent with acute pulmonary embolism and there may be smaller filling defects in branch vessels.   No definite right heart strain. 3. Moderate emphysema. A nodule in the medial right lower lobe measuring 1.0 x 0.8 cm could also be malignant and there are additional scattered tiny nodules. 4. Diffuse hepatic metastatic disease. 5. Tumor thrombus extends into the intrahepatic IVC and common iliac veins with possible extension into the external iliac veins but no definite extension into the renal veins. 6. Prominent retrocrural, right upper quadrant, and retroperitoneal lymph nodes also suspicious for metastatic disease. Findings were given to JAMEY Conner in the ED on 7/19/2022 at 5:38 p.m. MRI BRAIN W WO CONTRAST    Result Date: 7/20/2022  EXAMINATION: MRI OF THE BRAIN WITHOUT AND WITH CONTRAST  7/20/2022 4:22 pm TECHNIQUE: Multiplanar multisequence MRI of the head/brain was performed without and with the administration of intravenous contrast. COMPARISON: None. HISTORY: ORDERING SYSTEM PROVIDED HISTORY: staging lung cancer TECHNOLOGIST PROVIDED HISTORY: Reason for exam:->staging lung cancer Reason for Exam: staging lung cancer FINDINGS: INTRACRANIAL STRUCTURES/VENTRICLES:  There is no acute infarct. No mass effect or midline shift. No evidence of an acute intracranial hemorrhage. The ventricles and sulci are normal in size and configuration. The sellar/suprasellar regions appear unremarkable. The normal signal voids within the major intracranial vessels appear maintained. No abnormal focus of enhancement is seen within the brain. ORBITS: The visualized portion of the orbits demonstrate no acute abnormality. SINUSES: There is an opacified, expanded right maxillary sinus. This could reflect a mucocele. BONES/SOFT TISSUES: The bone marrow signal intensity appears normal. The soft tissues demonstrate no acute abnormality. No brain metastases identified. Possible right maxillary sinus mucocele. [facility] VASCULAR ACCESS GUIDANCE    Result Date: 7/21/2022  Radiology exam is complete. No Radiologist dictation.  Please follow up with ordering provider. 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 Liver synthetic dysfunction   Doubt nephrotic syndrome  Echo July 19 neg for chf that would explain this   Doubt related to immunotherapy complication this soon -as it can cause thyroid dysfunction and thus ?  Edema but highly doubt   Alimta can cause swelling as a side effect (one of her tx)  Creat slightly up     Hyponatremia-either from her lung or perhapse liver   Siadh would not be surprising     HOld chemo for now     Pt had been offered comfort care and does not want hospice         Shannan Dasilva MD, MD

## 2022-08-13 NOTE — H&P
Hospital Medicine History & Physical      PCP: Ani Rivera MD    Date of Admission: 8/12/2022    Chief Complaint: Severe fatigue, generalized weakness    History Of Present Illness:    Patient is a 58-year-old female with past medical history of non-small cell lung cancer hepatic metastasis and recent diagnosis of right-sided pulmonary embolism on Eliquis who presented to hospital who presented to hospital for generalized weakness fatigue. Patient mentions she does not feel well. Patient reports falling today. Past Medical History:          Diagnosis Date    Headache        Past Surgical History:          Procedure Laterality Date    APPENDECTOMY      PORT SURGERY N/A 7/21/2022    PORT A CATHETER PLACEMENT WITH FLUOROSCOPY performed by Gabrielle Mccracken MD at [facility]         Medications Prior to Admission:      Prior to Admission medications    Medication Sig Start Date End Date Taking? Authorizing Provider   oxyCODONE-acetaminophen (PERCOCET) 5-325 MG per tablet Take 1 tablet by mouth every 4 hours as needed for Pain. Yes Historical Provider, MD   ondansetron (ZOFRAN) 8 MG tablet Take 8 mg by mouth every 8 hours as needed for Nausea or Vomiting   Yes Historical Provider, MD   prochlorperazine (COMPAZINE) 10 MG tablet Take 10 mg by mouth every 6 hours as needed   Yes Historical Provider, MD   morphine (MS CONTIN) 15 MG extended release tablet Take 1 tablet by mouth in the morning and at bedtime. 7/27/22   Karlee Donaldson MD   naloxegol (MOVANTIK) 12.5 MG TABS tablet Take 1 tablet by mouth every morning (before breakfast)  Patient not taking: Reported on 8/12/2022 7/28/22   Karlee Donaldson MD   apixaban (ELIQUIS) 5 MG TABS tablet Take 10 mg twice a day for 7 more doses (3.5 days), then take 5 mg twice a day  Patient taking differently: Take 5 mg by mouth in the morning and 5 mg before bedtime. Take 10 mg twice a day for 7 more doses (3.5 days), then take 5 mg twice a day. 7/25/22   Christelle Hernández, DO   lidocaine 4 % external patch Place 1 patch onto the skin in the morning. 7/26/22   Christelle Hernández, DO   folic acid (FOLVITE) 1 MG tablet Take 1 tablet by mouth in the morning. 7/26/22   Christelle Hernández, DO   sennosides-docusate sodium (SENOKOT-S) 8.6-50 MG tablet Take 1 tablet by mouth in the morning. 7/25/22   Christelle Hernández, DO   Melatonin 10 MG TABS Take 10 mg by mouth at bedtime    Historical Provider, MD   SUMAtriptan (IMITREX) 100 MG tablet Take 1 tablet by mouth daily as needed for Migraine 6/23/22   Maria R De Souza MD       Allergies:  Patient has no known allergies. Social History:      TOBACCO:   reports that she has been smoking cigarettes. She has a 50.00 pack-year smoking history. She has never used smokeless tobacco.  ETOH:   reports no history of alcohol use. Family History:       Reviewed in detail and non contributory          Problem Relation Age of Onset    Arthritis Mother     High Blood Pressure Mother        REVIEW OF SYSTEMS:   Pertinent positives as noted in the HPI. All other systems reviewed and negative. PHYSICAL EXAM PERFORMED:    BP (!) 95/50   Pulse 90   Temp 98.5 °F (36.9 °C) (Axillary)   Resp 16   Ht 5' 7\" (1.702 m)   Wt 156 lb 15.5 oz (71.2 kg)   SpO2 96%   BMI 24.58 kg/m²     General appearance:  No apparent distress, cooperative. HEENT:  Normal cephalic, atraumatic without obvious deformity. Conjunctivae/corneas clear. Neck: Supple, with full range of motion. No cervical lymphadenopathy  Respiratory:  Normal respiratory effort. Clear to auscultation, bilaterally without Rales/Wheezes/Rhonchi. Cardiovascular:  Regular rate and rhythm with normal S1/S2 without murmurs, rubs or gallops. Abdomen: Soft, non-tender, non-distended, normal bowel sounds. Musculoskeletal:  +2 edema noted bilaterally. Right leg redness, swelling noted  Skin: no rash visible  Neurologic:  Neurologically intact without any focal sensory/motor deficits.   grossly metastatic lesion. XR CHEST PORTABLE   Final Result   Stable left upper lobe mass. No acute cardiopulmonary findings                 Active Hospital Problems    Diagnosis Date Noted    Generalized weakness [R53.1] 08/12/2022     Priority: Medium       Patient is a 59-year-old female with past medical history of non-small cell lung cancer hepatic metastasis and recent diagnosis of right-sided pulmonary embolism on Xarelto who presented to hospital who presented to hospital for generalized weakness fatigue. Patient mentions she does not feel well. Patient reports falling today. Assessment  Hyponatremia  Lactic acidosis  Right leg cellulitis  Transaminitis likely secondary to hepatic metastasis  History of mild small cell lung cancer with metastasis  Recent diagnosis of PE on Eliquis  Supratherapeutic INR-oncology following      Plan  start cefepime  Consult nephrology  Monitor BMP  Consulted palliative care  Consult oncology  Monitor lactic acid  DVT prophylaxis-on Eliquis  Resume home medications  Consult PT/OT  Diet: ADULT DIET; Regular; 1500 ml  Code Status: Full Code    PT/OT Eval Status: ordered    Dispo - pending clinical improvement       Buffy Turner MD    The note was completed using EMR and Dragon dictation system. Every effort was made to ensure accuracy; however, inadvertent computerized transcription errors may be present. Thank you Alpesh Stevenson MD for the opportunity to be involved in this patient's care. If you have any questions or concerns please feel free to contact me at [phone].     Buffy Turner MD

## 2022-08-13 NOTE — PROGRESS NOTES
Pt notified of pending transfer to [phone]; called /partner mr moore and notified him of pending transfer and to take pts medication home with him today; report given to [facility] and flow sheet updated

## 2022-08-13 NOTE — PROGRESS NOTES
Occupational Therapy  Facility/Department: [address] PROGRESSIVE CARE  Occupational Therapy Initial Assessment and Tentative D/C      Name: Abril Gaspar  : 1955  MRN: [id number]  Date of Service: 2022    Discharge Recommendations: Abril Gaspar scored a 16/24 on the AM-PAC ADL Inpatient form. Current research shows that an AM-PAC score of 17 or less is typically not associated with a discharge to the patient's home setting. Based on the patient's AM-PAC score and their current ADL deficits, it is recommended that the patient have 3-5 sessions per week of Occupational Therapy at d/c to increase the patient's independence. Please see assessment section for further patient specific details. If patient discharges prior to next session this note will serve as a discharge summary. Please see below for the latest assessment towards goals. Continue to assess pending progress, 3-5 sessions per week, Patient would benefit from continued therapy after discharge  OT Equipment Recommendations  Equipment Needed: No       Patient Diagnosis(es): The primary encounter diagnosis was Anasarca. Diagnoses of Dehydration, General weakness, Fall from bed, initial encounter, Hyponatremia, and Cellulitis of right lower extremity were also pertinent to this visit. Past Medical History:  has a past medical history of Headache. Past Surgical History:  has a past surgical history that includes Appendectomy; Tonsillectomy; and Port Surgery (N/A, 2022). Assessment   Performance deficits / Impairments: Decreased balance;Decreased functional mobility ; Decreased ADL status; Decreased endurance;Decreased strength;Decreased high-level IADLs  Assessment: Abril Gaspar is a 74 y.o. female who presents to the emergency department with ongoing weakness and difficulty transferring herself now. The patient has a history of metastatic non-small cell lung cancer being followed by Dr. Carmen Anguiano it is stage IV.   She is receiving palliative radiation to her legs. She is also receiving chemotherapy which she last had about a week and a half ago. She states she developed a PE and is taking Eliquis. She states the swelling in her legs have gotten much worse and it is going up into her abdomen and lower back. She states her legs are so swollen she can hardly bend her knees and she is having difficulty standing and sitting. Today she fell. She did not strike her head. She denies loss of consciousness. She denies headache. She denies back pain. She denies extremity pain. PTA pt from home with family where pt was Ind with mobility and ADLs with use of AD. Pt currently requires Mod/Max Ax1-2 for bed mobility. Pt completes transfers with Min A and use of stedy and Min/Mod Ax2 with RW. Pt unable to attempt ambulation due to weakness and low BP. Pt's BP at rest 82/56 in supine and 91/58 seated EOB. Pt reporting some light headedness. Anticipate pt needing up to Max A for ADLs. Pt will benefit from skilled OT services at this time. Anticipate pt with need for ongoing OT at time of D/C.   Prognosis: Good  Decision Making: Medium Complexity  Exam: see above  Assistance / Modification: RW; stedy  REQUIRES OT FOLLOW-UP: Yes  Activity Tolerance  Activity Tolerance: Patient limited by fatigue;Treatment limited secondary to medical complications (free text)  Activity Tolerance Comments: low BP        Plan   Plan  Times per Week: 3-5x  Current Treatment Recommendations: Strengthening, Functional mobility training, Balance training, Self-Care / ADL, Patient/Caregiver education & training, Safety education & training, Equipment evaluation, education, & procurement, Endurance training     Restrictions  Restrictions/Precautions  Restrictions/Precautions: Fall Risk    Subjective   General  Chart Reviewed: Yes  Patient assessed for rehabilitation services?: Yes  Additional Pertinent Hx: per ED note, Ej Gregg is a 74 y.o. female who cynthia  MAP (Calculated): 65.67  Resp: 20  SpO2: 94 %  O2 Device: None (Room air)             Safety Devices  Type of Devices: Call light within reach;Nurse notified; Chair alarm in place; Left in bed;Gait belt;Bed alarm in place  Restraints  Restraints Initially in Place: No  Bed Mobility Training  Bed Mobility Training: Yes  Overall Level of Assistance: Moderate assistance  Supine to Sit: Moderate assistance  Sit to Supine: Assist X2;Maximum assistance  Balance  Sitting: Intact  Standing: With support (stedy; RW)  Transfer Training  Transfer Training: Yes  Overall Level of Assistance: Minimum assistance;Assist X2;Moderate assistance; Total assistance (Min A x1 with stedy and Min/Mod Ax2 to/from RW)  Sit to Stand: Moderate assistance;Minimum assistance;Assist X2;Total assistance (stedy; RW; completed x3 stands with stedy and x1 with RW)  Stand to Sit: Moderate assistance;Minimum assistance;Assist X2;Total assistance  Toilet Transfer: Minimum assistance; Total assistance (stedy to bedside commode; used due to low BP)  Gait  Overall Level of Assistance:  (JEAN MARIE due to weakness and low BP)     AROM: Within functional limits  PROM: Within functional limits  Strength: Generally decreased, functional  Coordination: Within functional limits  Tone: Normal  ADL  LE Dressing: Maximum assistance (assist to don bilateral socks)  Toileting: Maximum assistance (assisst to manage brief; assist to complete pericare; Min A in stance in stedy)  Additional Comments: Anticipate pt needing up to Max A for ADLs based ROM, strength, and balance     Activity Tolerance  Activity Tolerance: Patient limited by endurance; Patient limited by fatigue  Activity Tolerance Comments: limited by overall weakness        Vision  Vision: Within Functional Limits  Hearing  Hearing: Within functional limits  Cognition  Overall Cognitive Status: WFL  Orientation  Overall Orientation Status: Within Functional Limits                  Education Given To:

## 2022-08-13 NOTE — CONSULTS
Nephrology Consult Note   [facility]. [employer]      Chief Complaint: Anasarca/weakness    Reason for Consultation: Anasarca and Hyponatremia    History of Present Illness: Ms RYAN Chavira 49 is a 75 yo female with past medical history significant for stage IV non-small cell lung cancer with widespread metastasis and recent diagnosis of right-sided pulmonary embolism on Eliquis who presented to hospital with generalized weakness & fatigue. She has also noticed worsening swelling of lower extremities extending up her thighs. Routine blood work revealed serum sodium level of 123. Patient cannot accurately tell me how much fluid she's drinking on a daily basis.  at bedside    Subjective:    Appears ill; ; NAD; no shortness of breath    ROS + Weakness \"couldn't get out of the chair\"; + swelling; Describes her appetite as good \"I'm a carnivore\". No chest pain; all other ROS negative    Scheduled Meds:   sodium chloride flush  10 mL IntraVENous 2 times per day    cefepime  2,000 mg IntraVENous Once    morphine  15 mg Oral BID    melatonin  9 mg Oral Nightly    apixaban  5 mg Oral BID    folic acid  1 mg Oral Daily    naloxegol  12.5 mg Oral QAM AC    sennosides-docusate sodium  1 tablet Oral Daily    cefepime  2,000 mg IntraVENous Q12H        sodium chloride         PRN Meds:.sodium chloride flush, sodium chloride, potassium chloride **OR** potassium alternative oral replacement **OR** potassium chloride, potassium chloride, magnesium sulfate, promethazine **OR** ondansetron, magnesium hydroxide, iopamidol, oxyCODONE, oxyCODONE-acetaminophen, SUMAtriptan    Physical Exam:    TEMPERATURE:  Current - Temp: 97.6 °F (36.4 °C);  Max - Temp  Av.8 °F (36.6 °C)  Min: 97.5 °F (36.4 °C)  Max: 98.5 °F (36.9 °C)  RESPIRATIONS RANGE: Resp  Av.5  Min: 14  Max: 20  PULSE RANGE: Pulse  Av.1  Min: 79  Max: 93  BLOOD PRESSURE RANGE:  Systolic (59CUS), QXH:66 , Min:79 , FDJ:97   ; Diastolic (76TPJ), XOI:58, Min:36, Max:59    24HR INTAKE/OUTPUT:    Intake/Output Summary (Last 24 hours) at 8/13/2022 1259  Last data filed at 8/13/2022 0753  Gross per 24 hour   Intake 240 ml   Output 200 ml   Net 40 ml       Patient Vitals for the past 96 hrs (Last 3 readings):   Weight   08/13/22 0613 158 lb 11.7 oz (72 kg)   08/12/22 1845 156 lb 15.5 oz (71.2 kg)       General: Alert, Awake,   HEENT: Normocephalic, atraumatic, Nose and ears appear externally without deformity, MMM  Neck: No Thyromegaly, Trachea is midline, No Carotid bruit  Eyes: EOMI, JO  Chest: clear to auscultation, no intercostal retractions  CVS: RRR, no murmur, no rub  Abdomen: soft, non tender, no organomegaly, no bruit appreciated  Extremities: 3/4+ edema extending up to buttocks, no cyanosis.   Skin: erythema right leg  Neurological: CN intact, no focal motor neurological deficit  Psych: Flat affect; O x 3    Past Medical History:   Diagnosis Date    Headache      Past Surgical History:   Procedure Laterality Date    APPENDECTOMY      PORT SURGERY N/A 7/21/2022    PORT A CATHETER PLACEMENT WITH FLUOROSCOPY performed by Trip Aquino MD at [address] [place] [address]       Family History   Problem Relation Age of Onset    Arthritis Mother     High Blood Pressure Mother      Social History     Socioeconomic History    Marital status: Single     Spouse name: Not on file    Number of children: Not on file    Years of education: Not on file    Highest education level: Not on file   Occupational History    Occupation: health tech   Tobacco Use    Smoking status: Every Day     Packs/day: 1.00     Years: 50.00     Pack years: 50.00     Types: Cigarettes    Smokeless tobacco: Never   Substance and Sexual Activity    Alcohol use: No    Drug use: No    Sexual activity: Not on file   Other Topics Concern    Not on file   Social History Narrative    Not on file     Social Determinants of Health     Financial Resource Strain: Not on file   Food Insecurity: Not on file   Transportation Needs: Not on file   Physical Activity: Not on file   Stress: Not on file   Social Connections: Not on file   Intimate Partner Violence: Not on file   Housing Stability: Not on file         Allergies:  No Known Allergies       LAB DATA:    CBC:   Lab Results   Component Value Date/Time    WBC 6.8 08/13/2022 06:08 AM    RBC 3.69 08/13/2022 06:08 AM    HGB 11.0 08/13/2022 06:08 AM    HCT 33.4 08/13/2022 06:08 AM    MCV 90.5 08/13/2022 06:08 AM    MCH 29.9 08/13/2022 06:08 AM    MCHC 33.1 08/13/2022 06:08 AM    RDW 17.2 08/13/2022 06:08 AM     08/13/2022 06:08 AM    MPV 7.5 08/13/2022 06:08 AM     BMP:    Lab Results   Component Value Date/Time     08/13/2022 06:08 AM    K 4.9 08/13/2022 06:08 AM    CL 83 08/13/2022 06:08 AM    CO2 23 08/13/2022 06:08 AM    BUN 24 08/13/2022 06:08 AM    CREATININE 1.3 08/13/2022 06:08 AM    CALCIUM 9.0 08/13/2022 06:08 AM    GFRAA 49 08/13/2022 06:08 AM    LABGLOM 41 08/13/2022 06:08 AM    GLUCOSE 66 08/13/2022 06:08 AM       U/A:    Lab Results   Component Value Date/Time    COLORU DARK YELLOW 08/12/2022 12:40 PM    PHUR 5.0 08/12/2022 12:40 PM    WBCUA 3 08/12/2022 12:40 PM    RBCUA 4 08/12/2022 12:40 PM    MUCUS Present 08/12/2022 12:40 PM    TRICHOMONAS None Seen 11/07/2019 04:45 PM    BACTERIA None Seen 08/12/2022 12:40 PM    CLARITYU CLOUDY 08/12/2022 12:40 PM    SPECGRAV 1.031 08/12/2022 12:40 PM    LEUKOCYTESUR Negative 08/12/2022 12:40 PM    UROBILINOGEN 1.0 08/12/2022 12:40 PM    BILIRUBINUR Negative 08/12/2022 12:40 PM    BLOODU Negative 08/12/2022 12:40 PM    GLUCOSEU Negative 08/12/2022 12:40 PM    AMORPHOUS Present 08/12/2022 12:40 PM         IMPRESSION/RECOMMENDATIONS:      Principal Problem:    Generalized weakness  Resolved Problems:    * No resolved hospital problems. *    Anasarca - Start Lasix gtt / scheduled IV albumin    2. Hyponatremia - FR 1.2 L / day  - U Na < 20 likely because of Hypotension, Urine osmolality 382    3.  Proteinuria on UA   -

## 2022-08-14 LAB
ALBUMIN SERPL-MCNC: 2.8 G/DL (ref 3.4–5)
ANION GAP SERPL CALCULATED.3IONS-SCNC: 22 MMOL/L (ref 3–16)
BASOPHILS ABSOLUTE: 0 K/UL (ref 0–0.2)
BASOPHILS RELATIVE PERCENT: 0.5 %
BUN BLDV-MCNC: 31 MG/DL (ref 7–20)
CALCIUM SERPL-MCNC: 8.5 MG/DL (ref 8.3–10.6)
CHLORIDE BLD-SCNC: 83 MMOL/L (ref 99–110)
CO2: 20 MMOL/L (ref 21–32)
CREAT SERPL-MCNC: 1.8 MG/DL (ref 0.6–1.2)
CREATININE URINE: 98 MG/DL (ref 28–259)
EOSINOPHILS ABSOLUTE: 0 K/UL (ref 0–0.6)
EOSINOPHILS RELATIVE PERCENT: 0.1 %
GFR AFRICAN AMERICAN: 34
GFR NON-AFRICAN AMERICAN: 28
GLUCOSE BLD-MCNC: 100 MG/DL (ref 70–99)
GLUCOSE BLD-MCNC: 115 MG/DL (ref 70–99)
GLUCOSE BLD-MCNC: 82 MG/DL (ref 70–99)
HCT VFR BLD CALC: 30.1 % (ref 36–48)
HEMOGLOBIN: 10 G/DL (ref 12–16)
LYMPHOCYTES ABSOLUTE: 1.4 K/UL (ref 1–5.1)
LYMPHOCYTES RELATIVE PERCENT: 17 %
MAGNESIUM: 2 MG/DL (ref 1.8–2.4)
MCH RBC QN AUTO: 30.1 PG (ref 26–34)
MCHC RBC AUTO-ENTMCNC: 33.2 G/DL (ref 31–36)
MCV RBC AUTO: 90.8 FL (ref 80–100)
MICROALBUMIN UR-MCNC: 28.1 MG/DL
MICROALBUMIN/CREAT UR-RTO: 286.7 MG/G (ref 0–30)
MONOCYTES ABSOLUTE: 0.4 K/UL (ref 0–1.3)
MONOCYTES RELATIVE PERCENT: 5.4 %
NEUTROPHILS ABSOLUTE: 6.2 K/UL (ref 1.7–7.7)
NEUTROPHILS RELATIVE PERCENT: 77 %
PDW BLD-RTO: 19 % (ref 12.4–15.4)
PERFORMED ON: ABNORMAL
PERFORMED ON: ABNORMAL
PHOSPHORUS: 5 MG/DL (ref 2.5–4.9)
PLATELET # BLD: 208 K/UL (ref 135–450)
PMV BLD AUTO: 7.3 FL (ref 5–10.5)
POTASSIUM SERPL-SCNC: 4.7 MMOL/L (ref 3.5–5.1)
RBC # BLD: 3.31 M/UL (ref 4–5.2)
SODIUM BLD-SCNC: 125 MMOL/L (ref 136–145)
URIC ACID, SERUM: 18.6 MG/DL (ref 2.6–6)
WBC # BLD: 8 K/UL (ref 4–11)

## 2022-08-14 PROCEDURE — 84550 ASSAY OF BLOOD/URIC ACID: CPT

## 2022-08-14 PROCEDURE — 36591 DRAW BLOOD OFF VENOUS DEVICE: CPT

## 2022-08-14 PROCEDURE — 2580000003 HC RX 258: Performed by: INTERNAL MEDICINE

## 2022-08-14 PROCEDURE — 6370000000 HC RX 637 (ALT 250 FOR IP): Performed by: INTERNAL MEDICINE

## 2022-08-14 PROCEDURE — 51798 US URINE CAPACITY MEASURE: CPT

## 2022-08-14 PROCEDURE — 85025 COMPLETE CBC W/AUTO DIFF WBC: CPT

## 2022-08-14 PROCEDURE — 2060000000 HC ICU INTERMEDIATE R&B

## 2022-08-14 PROCEDURE — 6360000002 HC RX W HCPCS: Performed by: INTERNAL MEDICINE

## 2022-08-14 PROCEDURE — 82570 ASSAY OF URINE CREATININE: CPT

## 2022-08-14 PROCEDURE — 6360000002 HC RX W HCPCS: Performed by: NURSE PRACTITIONER

## 2022-08-14 PROCEDURE — 82043 UR ALBUMIN QUANTITATIVE: CPT

## 2022-08-14 PROCEDURE — 6370000000 HC RX 637 (ALT 250 FOR IP): Performed by: NURSE PRACTITIONER

## 2022-08-14 PROCEDURE — 51702 INSERT TEMP BLADDER CATH: CPT

## 2022-08-14 PROCEDURE — 80069 RENAL FUNCTION PANEL: CPT

## 2022-08-14 PROCEDURE — P9047 ALBUMIN (HUMAN), 25%, 50ML: HCPCS | Performed by: INTERNAL MEDICINE

## 2022-08-14 PROCEDURE — 83735 ASSAY OF MAGNESIUM: CPT

## 2022-08-14 PROCEDURE — 2580000003 HC RX 258: Performed by: NURSE PRACTITIONER

## 2022-08-14 PROCEDURE — 94760 N-INVAS EAR/PLS OXIMETRY 1: CPT

## 2022-08-14 RX ORDER — MIDODRINE HYDROCHLORIDE 10 MG/1
10 TABLET ORAL
Status: DISCONTINUED | OUTPATIENT
Start: 2022-08-14 | End: 2022-08-16

## 2022-08-14 RX ORDER — COSYNTROPIN 0.25 MG/ML
250 INJECTION, POWDER, FOR SOLUTION INTRAMUSCULAR; INTRAVENOUS ONCE
Status: COMPLETED | OUTPATIENT
Start: 2022-08-15 | End: 2022-08-15

## 2022-08-14 RX ADMIN — APIXABAN 5 MG: 5 TABLET, FILM COATED ORAL at 09:07

## 2022-08-14 RX ADMIN — ALBUMIN (HUMAN) 25 G: 0.25 INJECTION, SOLUTION INTRAVENOUS at 05:07

## 2022-08-14 RX ADMIN — FUROSEMIDE 4 MG/HR: 10 INJECTION, SOLUTION INTRAMUSCULAR; INTRAVENOUS at 19:03

## 2022-08-14 RX ADMIN — MIDODRINE HYDROCHLORIDE 10 MG: 10 TABLET ORAL at 12:55

## 2022-08-14 RX ADMIN — NALOXEGOL OXALATE 12.5 MG: 12.5 TABLET, FILM COATED ORAL at 06:40

## 2022-08-14 RX ADMIN — SODIUM CHLORIDE, PRESERVATIVE FREE 10 ML: 5 INJECTION INTRAVENOUS at 09:08

## 2022-08-14 RX ADMIN — CEFEPIME 2000 MG: 2 INJECTION, POWDER, FOR SOLUTION INTRAVENOUS at 18:51

## 2022-08-14 RX ADMIN — ALBUMIN (HUMAN) 25 G: 0.25 INJECTION, SOLUTION INTRAVENOUS at 23:00

## 2022-08-14 RX ADMIN — ONDANSETRON 4 MG: 2 INJECTION INTRAMUSCULAR; INTRAVENOUS at 09:07

## 2022-08-14 RX ADMIN — MIDODRINE HYDROCHLORIDE 10 MG: 10 TABLET ORAL at 18:39

## 2022-08-14 RX ADMIN — FOLIC ACID 1 MG: 1 TABLET ORAL at 09:07

## 2022-08-14 RX ADMIN — SENNOSIDES AND DOCUSATE SODIUM 1 TABLET: 50; 8.6 TABLET ORAL at 09:07

## 2022-08-14 RX ADMIN — ONDANSETRON 4 MG: 2 INJECTION INTRAMUSCULAR; INTRAVENOUS at 23:13

## 2022-08-14 RX ADMIN — CEFEPIME 2000 MG: 2 INJECTION, POWDER, FOR SOLUTION INTRAVENOUS at 06:40

## 2022-08-14 RX ADMIN — MORPHINE SULFATE 15 MG: 15 TABLET, FILM COATED, EXTENDED RELEASE ORAL at 09:07

## 2022-08-14 RX ADMIN — ALBUMIN (HUMAN) 25 G: 0.25 INJECTION, SOLUTION INTRAVENOUS at 15:28

## 2022-08-14 RX ADMIN — MIDODRINE HYDROCHLORIDE 5 MG: 5 TABLET ORAL at 09:05

## 2022-08-14 ASSESSMENT — PAIN DESCRIPTION - LOCATION
LOCATION: ABDOMEN
LOCATION_2: LEG

## 2022-08-14 ASSESSMENT — PAIN DESCRIPTION - DESCRIPTORS
DESCRIPTORS_2: SHARP;STABBING
DESCRIPTORS: ACHING;PRESSURE

## 2022-08-14 ASSESSMENT — PAIN DESCRIPTION - FREQUENCY: FREQUENCY: CONTINUOUS

## 2022-08-14 ASSESSMENT — PAIN DESCRIPTION - INTENSITY: RATING_2: 9

## 2022-08-14 ASSESSMENT — PAIN SCALES - WONG BAKER: WONGBAKER_NUMERICALRESPONSE: 0

## 2022-08-14 ASSESSMENT — PAIN DESCRIPTION - ORIENTATION
ORIENTATION_2: RIGHT
ORIENTATION: RIGHT

## 2022-08-14 ASSESSMENT — PAIN - FUNCTIONAL ASSESSMENT: PAIN_FUNCTIONAL_ASSESSMENT: PREVENTS OR INTERFERES WITH MANY ACTIVE NOT PASSIVE ACTIVITIES

## 2022-08-14 ASSESSMENT — PAIN SCALES - GENERAL: PAINLEVEL_OUTOF10: 9

## 2022-08-14 ASSESSMENT — PAIN DESCRIPTION - PAIN TYPE: TYPE: ACUTE PAIN;CHRONIC PAIN

## 2022-08-14 ASSESSMENT — PAIN DESCRIPTION - ONSET: ONSET: ON-GOING

## 2022-08-14 NOTE — PROGRESS NOTES
Hospitalist Progress Note      PCP: Amy Mcneill MD    Date of Admission: 8/12/2022    Chief Complaint: nausea    Hospital Course:      Subjective: nausea still improving. Retained 300 cc urine yesterday, no other UOP charted yesterday       Medications:  Reviewed    Infusion Medications    furosemide (LASIX) 1mg/ml infusion 2 mg/hr (08/13/22 1716)    sodium chloride       Scheduled Medications    [START ON 8/15/2022] cosyntropin  250 mcg IntraVENous Once    midodrine  5 mg Oral TID WC    albumin human  25 g IntraVENous Q8H    sodium chloride flush  10 mL IntraVENous 2 times per day    cefepime  2,000 mg IntraVENous Once    morphine  15 mg Oral BID    melatonin  9 mg Oral Nightly    apixaban  5 mg Oral BID    folic acid  1 mg Oral Daily    naloxegol  12.5 mg Oral QAM AC    sennosides-docusate sodium  1 tablet Oral Daily    cefepime  2,000 mg IntraVENous Q12H     PRN Meds: sodium chloride flush, sodium chloride, potassium chloride **OR** potassium alternative oral replacement **OR** potassium chloride, potassium chloride, magnesium sulfate, promethazine **OR** ondansetron, magnesium hydroxide, iopamidol, oxyCODONE, oxyCODONE-acetaminophen, SUMAtriptan      Intake/Output Summary (Last 24 hours) at 8/14/2022 1018  Last data filed at 8/14/2022 0404  Gross per 24 hour   Intake 218.32 ml   Output 0 ml   Net 218.32 ml         Exam:    BP (!) 97/42   Pulse 90   Temp 98.1 °F (36.7 °C) (Axillary)   Resp 18   Ht 5' 7\" (1.702 m)   Wt 156 lb 15.5 oz (71.2 kg)   SpO2 96%   BMI 24.58 kg/m²     General appearance: No apparent distress, appears stated age and cooperative. HEENT: Pupils equal, round, and reactive to light. Conjunctivae/corneas clear. Neck: Supple, with full range of motion. No jugular venous distention. Trachea midline. Respiratory:  Normal respiratory effort. Clear to auscultation, bilaterally without Rales/Wheezes/Rhonchi.   Cardiovascular: Regular rate and rhythm with normal S1/S2 without murmurs, rubs or gallops. Abdomen: Soft, non-tender, non-distended with normal bowel sounds. Musculoskeletal: No clubbing, cyanosis or edema bilaterally. Full range of motion without deformity. Skin: Diffusely anasarcic. Lower legs with distal erythema and mild induration  Neurologic:  Neurovascularly intact without any focal sensory/motor deficits. Cranial nerves: II-XII intact, grossly non-focal.  Psychiatric: Alert and oriented, thought content appropriate, normal insight  Capillary Refill: Brisk,< 3 seconds   Peripheral Pulses: +2 palpable, equal bilaterally       Labs:   Recent Labs     08/12/22  1240 08/13/22  0608 08/14/22  0430   WBC 6.1 6.8 8.0   HGB 11.1* 11.0* 10.0*   HCT 33.0* 33.4* 30.1*    214 208       Recent Labs     08/12/22  1240 08/13/22  0608 08/14/22  0430   * 123* 125*   K 4.3 4.9 4.7   CL 84* 83* 83*   CO2 23 23 20*   BUN 23* 24* 31*   CREATININE 1.0 1.3* 1.8*   CALCIUM 8.7 9.0 8.5   PHOS  --   --  5.0*       Recent Labs     08/12/22  1240   *   ALT 44*   BILIDIR 0.7*   BILITOT 1.4*   ALKPHOS 790*       Recent Labs     08/12/22  1240   INR 3.04*       Recent Labs     08/12/22  1240   TROPONINI <0.01         Urinalysis:      Lab Results   Component Value Date/Time    NITRU Negative 08/12/2022 12:40 PM    WBCUA 3 08/12/2022 12:40 PM    BACTERIA None Seen 08/12/2022 12:40 PM    RBCUA 4 08/12/2022 12:40 PM    BLOODU Negative 08/12/2022 12:40 PM    SPECGRAV 1.031 08/12/2022 12:40 PM    GLUCOSEU Negative 08/12/2022 12:40 PM       Radiology:  CT CHEST ABDOMEN PELVIS W CONTRAST   Final Result   CHEST:      1. Resolution of the previously identified pulmonary embolism within the   right inter lobar pulmonary artery. 2. Large left upper lobe mass stable to slightly increased in size from   07/19/2022. 3. 6 mm nodule in the medial right lung base significantly decreased in size   from the previous exam.  Several other smaller unchanged scattered bilateral   pulmonary nodules. 4. Moderate emphysematous changes. ABDOMEN AND PELVIS:      1. Filling defect again seen within the inferior vena cava extending to the   confluence of the common iliac veins. This is stable to mildly decreased in   size from the previous study. This may represent a bland thrombus or tumor   thrombus. 2. Extensive hepatic metastases without significant change. 3. Retroperitoneal lymphadenopathy mildly worsened from the previous study. 4. Unchanged thickening of the bilateral adrenal glands suspicious for   metastatic involvement. 5. Aggressive lytic lesion in the left acetabulum without significant change   suspicious for a metastatic lesion. XR CHEST PORTABLE   Final Result   Stable left upper lobe mass. No acute cardiopulmonary findings                 Assessment/Plan:    Active Hospital Problems    Diagnosis Date Noted    Generalized weakness [R53.1] 08/12/2022     Priority: Medium       Cellulitis  - cont cefepime  - f/u MRSA swab    Anasarca  Hyponatremia  Hypotension  - uncertain the cause,Echo 1 month ago with normal EF and diastolic function  - oncology does not think is due to immunotherapy  - nephro consulted  - lasix drip with albumin --> drip increased per nephro  - midodrine dose increased    Transaminitis likely secondary to hepatic metastasis    Recent PE on Eliquis    Stage 4 non-small cell lung cancer  - oncology consulted --> holding chemo   - has discussed goals of care in the past and patient declining hospice      DVT Prophylaxis: Eliquis  Diet: ADULT DIET; Regular; 1200 ml  Code Status: Full Code    PT/OT Eval Status: ordered    Dispo - PCU -- guarded prognosis.   Palliative consulted    Mayra Lin MD

## 2022-08-14 NOTE — PROGRESS NOTES
[facility]  Oncology Hematology Care  Progress Note      SUBJECTIVE:   Pt of dr Ivonne Germain who has had one dose of chemo -[place]   She was due yesterday or so and did not get it   PT is mostly looking weak/withdrawn   She has no specific complaints   On albumin and lasix   BP running low   ROS: No fever chills sweats, no nausea, vomiting, diarrhea    OBJECTIVE      Medications    Current Facility-Administered Medications: [START ON 8/15/2022] cosyntropin (CORTROSYN) injection 250 mcg, 250 mcg, IntraVENous, Once  midodrine (PROAMATINE) tablet 5 mg, 5 mg, Oral, TID WC  albumin human 25 % IV solution 25 g, 25 g, IntraVENous, Q8H  furosemide (LASIX) 100 mg in dextrose 5 % 100 mL infusion, 2 mg/hr, IntraVENous, Continuous  sodium chloride flush 0.9 % injection 10 mL, 10 mL, IntraVENous, 2 times per day  sodium chloride flush 0.9 % injection 10 mL, 10 mL, IntraVENous, PRN  0.9 % sodium chloride infusion, , IntraVENous, PRN  potassium chloride (KLOR-CON M) extended release tablet 40 mEq, 40 mEq, Oral, PRN **OR** potassium bicarb-citric acid (EFFER-K) effervescent tablet 40 mEq, 40 mEq, Oral, PRN **OR** potassium chloride 10 mEq/100 mL IVPB (Peripheral Line), 10 mEq, IntraVENous, PRN  potassium chloride 10 mEq/100 mL IVPB (Peripheral Line), 10 mEq, IntraVENous, PRN  magnesium sulfate 2000 mg in 50 mL IVPB premix, 2,000 mg, IntraVENous, PRN  promethazine (PHENERGAN) tablet 12.5 mg, 12.5 mg, Oral, Q6H PRN **OR** ondansetron (ZOFRAN) injection 4 mg, 4 mg, IntraVENous, Q6H PRN  magnesium hydroxide (MILK OF MAGNESIA) 400 MG/5ML suspension 30 mL, 30 mL, Oral, Daily PRN  cefepime (MAXIPIME) 2000 mg IVPB minibag, 2,000 mg, IntraVENous, Once  iopamidol (ISOVUE-370) 76 % injection 75 mL, 75 mL, IntraVENous, ONCE PRN  morphine (MS CONTIN) extended release tablet 15 mg, 15 mg, Oral, BID  melatonin tablet 9 mg, 9 mg, Oral, Nightly  oxyCODONE (ROXICODONE) immediate release tablet 5 mg, 5 mg, Oral, Q4H PRN  apixaban (ELIQUIS) tablet 5 mg, 5 mg, Oral, BID  folic acid (FOLVITE) tablet 1 mg, 1 mg, Oral, Daily  naloxegol (MOVANTIK) tablet 12.5 mg, 12.5 mg, Oral, QAM AC  oxyCODONE-acetaminophen (PERCOCET) 5-325 MG per tablet 1 tablet, 1 tablet, Oral, Q4H PRN  sennosides-docusate sodium (SENOKOT-S) 8.6-50 MG tablet 1 tablet, 1 tablet, Oral, Daily  SUMAtriptan (IMITREX) tablet 100 mg, 100 mg, Oral, Daily PRN  cefepime (MAXIPIME) 2000 mg IVPB minibag, 2,000 mg, IntraVENous, Q12H  Physical    VITALS:  BP (!) 97/42   Pulse 90   Temp 98.1 °F (36.7 °C) (Axillary)   Resp 18   Ht 5' 7\" (1.702 m)   Wt 156 lb 15.5 oz (71.2 kg)   SpO2 96%   BMI 24.58 kg/m²   TEMPERATURE:  Current - Temp: 98.1 °F (36.7 °C); Max - Temp  Av °F (36.7 °C)  Min: 97.6 °F (36.4 °C)  Max: 98.5 °F (36.9 °C)  PULSE OXIMETRY RANGE: SpO2  Av.5 %  Min: 96 %  Max: 98 %  24HR INTAKE/OUTPUT:    Intake/Output Summary (Last 24 hours) at 2022 1018  Last data filed at 2022 0404  Gross per 24 hour   Intake 218.32 ml   Output 0 ml   Net 218.32 ml       CONSTITUTIONAL:  awake, alert, cooperative, no apparent distress,chronically ill  HEENT oral pharynx , no scleral icterus  HEMATOLOGIC/LYMPHATICS:  no cervical lymphadenopathy, no supraclavicular lymphadenopathy,   LUNGS:  No increased work of breathing, good air exchange, clear to auscultation bilaterally, no crackles or wheezing  CARDIOVASCULAR:  , regular rate and rhythm, normal S1 and S2, no S3 or S4, and no murmur noted  ABDOMEN:  No scars, normal bowel sounds, soft, non-distended, non-tender, no masses palpated, no hepatosplenomegally  MUSCULOSKELETAL:  There is no redness, warmth, or swelling of the joints. EXTREMETIES: + edema   NEUROLOGIC:  Awake, alert, oriented to name, place and time. Cranial nerves II-XII are grossly intact.     SKIN: lower ext right slight erythema   Chronic stasisdermatitis       Data      Recent Labs     22  1240 22  0608 22  0430   WBC 6.1 6.8 8.0   HGB 11.1* 11.0* 10.0*   HCT 33.0* 33.4* 30.1*    214 208   MCV 89.5 90.5 90.8        Recent Labs     08/12/22  1240 08/13/22  0608 08/14/22  0430   * 123* 125*   K 4.3 4.9 4.7   CL 84* 83* 83*   CO2 23 23 20*   PHOS  --   --  5.0*   BUN 23* 24* 31*   CREATININE 1.0 1.3* 1.8*     Recent Labs     08/12/22  1240   *   ALT 44*   BILIDIR 0.7*   BILITOT 1.4*   ALKPHOS 790*       Magnesium:    Lab Results   Component Value Date/Time    MG 2.00 08/14/2022 04:30 AM       Imaging Echo Complete    Result Date: 7/20/2022  Transthoracic Echocardiography Report (TTE)  Demographics   Patient Name      MARIAELENA Shi [place]   Date of Study     07/20/2022         Gender              Female   Patient Number    [id number]         Date of Birth       1955   Visit Number      [id number]          Age                 74 year(s)   Accession Number  [id number]         Room Number         1967   Corporate ID      [id number]            Sonographer         Merrill Houston   Ordering          Adele Chacon CNP           Physician           Gerardo Saldana MD  Procedure Type of Study   TTE procedure:ECHOCARDIOGRAM COMPLETE 2D W DOPPLER W COLOR. Procedure Date Date: 07/20/2022 Start: 09:11 AM Study Location: [facility] Technical Quality: Adequate visualization Indications:Pulmonary embolus. Patient Status: Routine Height: 67 inches Weight: 147 pounds BSA: 1.77 m2 BMI: 23.02 kg/m2 HR: 84 bpm BP: 108/57 mmHg  Conclusions   Summary  Left ventricular cavity size is normal.  Ejection fraction is visually estimated to be 55-60%. No regional wall motion abnormalities are noted. Normal diastolic function. Mildly dilated right ventricle. Normal RV systolic function  Estimated pulmonary artery systolic pressure is mildly elevated at 33 mmHg  assuming a right atrial pressure of 3 mmHg.    Signature ------------------------------------------------------------------  Electronically signed by Hector Orantes MD (Interpreting  physician) on 07/20/2022 at 01:42 PM  ------------------------------------------------------------------   Findings   Left Ventricle  Left ventricular cavity size is normal.  Ejection fraction is visually estimated to be 55-60%. No regional wall motion abnormalities are noted. Normal diastolic function. Mitral Valve  The mitral valve appears structurally normal.  No evidence of mitral regurgitation or stenosis. Left Atrium  The left atrium is normal in size. Aortic Valve  The aortic valve is structurally normal.  No evidence of aortic valve regurgitation  Aortic sclerosis with no significant stenosis. Aorta  The aortic root and ascending aorta are normal in size. Right Ventricle  Mildly dilated right ventricle. Normal RV systolic function  RV strain: RVFWSL= -5.7%   Tricuspid Valve  Tricuspid valve is structurally normal.  Mild tricuspid regurgitation. No evidence of tricuspid stenosis. Tapse=2.1   Right Atrium  The right atrial size is normal.   Pulmonic Valve  The pulmonic valve is not well visualized. There is no evidence of pulmonic valve regurgitation or stenosis. Pericardial Effusion  No pericardial effusion noted. Pleural Effusion  No pleural effusion. Miscellaneous  IVC size is normal (<2.1cm) and collapses > 50% with respiration consistent  with normal RA pressure (3mmHg). Estimated pulmonary artery systolic pressure is mildly elevated at 33 mmHg  assuming a right atrial pressure of 3 mmHg.   M-Mode/2D Measurements (cm)   LV Diastolic Dimension: 1.97 cm LV Systolic Dimension: 0.55 cm  LV Septum Diastolic: 0.12 cm  LV PW Diastolic: 7.86 cm        AO Root Dimension: 3.4 cm                                   LA Area: 14.3 cm2  LVOT: 2.3 cm                    LA volume/Index: 34.7 ml /20 ml/m2  Doppler Measurements   AV Peak Velocity: 155 cm/s     MV Peak E-Wave: 47.6 cm/s  AV Peak Gradient: 9.61 mmHg    MV Peak A-Wave: 89.5 cm/s  LVOT Peak Velocity: 138 cm/s   MV E/A Ratio: 0.53                                 MV Mean Gradient: 1 mmHg  TR Velocity:316 cm/s           MV Max PG: 3 mmHg  TR Gradient:39.94 mmHg         MV Vmax:87.2 cm/s  Estimated RAP:3 mmHg           MV VTI:19.3 cm/s  Estimated RVSP: 43 mmHg  E' Septal Velocity: 10.3 cm/s  MV Area (continuity): 5.59 cm2  E' Lateral Velocity: 11.3 cm/s MV Deceleration Time: 283 msec  PV Peak Velocity: 90 cm/s  PV Peak Gradient: 3.24 mmHg   Aortic Valve   Peak Velocity: 155 cm/s  Peak Gradient: 9.61 mmHg  Aorta   Aortic Root: 3.4 cm  Ascending Aorta: 3.8 cm  LVOT Diameter: 2.3 cm      XR CHEST PORTABLE    Result Date: 8/12/2022  EXAMINATION: ONE XRAY VIEW OF THE CHEST 8/12/2022 12:23 pm COMPARISON: Chest x-ray dated 21 July 2022 HISTORY: ORDERING SYSTEM PROVIDED HISTORY: weakness TECHNOLOGIST PROVIDED HISTORY: Reason for exam:->weakness Reason for Exam: weakness FINDINGS: Stable left upper lobe mass. No acute airspace infiltrate. No pneumothorax or pleural effusion. Stable cardiomediastinal silhouette. Stable positioning of a right-sided chest port catheter     Stable left upper lobe mass. No acute cardiopulmonary findings     XR CHEST PORTABLE    Result Date: 7/21/2022  EXAM: XR Chest, 1 View EXAM DATE/TIME: 7/21/2022 1:56 pm CLINICAL HISTORY: ORDERING SYSTEM PROVIDED  port TECHNOLOGIST PROVIDED HISTORY: Reason for exam:->port Reason for Exam: port placement TECHNIQUE: Frontal view of the chest. COMPARISON: 07/13/2022 FINDINGS: Limitations:  A suboptimal inspiration limits the study. Lungs:  Presumed mass within the left mid lung field is unchanged. Some opacity in left lower lung field is likely related to a suboptimal inspiration and otherwise the lungs appear unchanged. Pleural space:  No acute findings. No pneumothorax. Heart:  No acute findings. No cardiomegaly. Mediastinum:  No acute findings.  Bones/joints:  No acute findings. Tubes, lines and devices:  Right-sided chest port has been placed via the right subclavian vein since the prior study with the tip in the upper SVC. 1.  Right-sided chest port has been placed via the right subclavian vein since the prior study with the tip in the upper SVC. No evidence of pneumothorax. 2.  Presumed mass within the left mid lung field is unchanged. 3.  Some opacity in left lower lung field is likely related to a suboptimal inspiration and otherwise the lungs appear unchanged. CT CHEST ABDOMEN PELVIS W CONTRAST    Result Date: 8/12/2022  EXAMINATION: CT OF THE CHEST, ABDOMEN, AND PELVIS WITH CONTRAST 8/12/2022 3:15 pm TECHNIQUE: CT of the chest, abdomen and pelvis was performed with the administration of intravenous contrast. Multiplanar reformatted images are provided for review. Automated exposure control, iterative reconstruction, and/or weight based adjustment of the mA/kV was utilized to reduce the radiation dose to as low as reasonably achievable. COMPARISON: CT chest, abdomen, and pelvis 07/19/2022. Chest radiograph 08/12/2022. HISTORY: ORDERING SYSTEM PROVIDED HISTORY: anasarca worsening, look at [facility] clot TECHNOLOGIST PROVIDED HISTORY: Reason for exam:->anasarca worsening, look at IVC clot Additional Contrast?->None FINDINGS: Chest: Mediastinum: The thoracic aorta is normal in caliber with mild atherosclerosis. No acute aortic abnormality identified. The coronary arteries are unremarkable. No acute abnormality in the branch vessels of the superior mediastinum and lower neck. A right chest port terminates in the lower superior vena cava. The main pulmonary artery is normal in caliber. The previously identified pulmonary embolism in the right interlobar pulmonary artery is no longer seen. No pulmonary embolism identified within limits of the exam.  The heart is normal in size. No pericardial effusion. The mediastinal esophagus and thyroid gland are unremarkable. No lymphadenopathy or pneumomediastinum. Lungs/pleura: Occlusion of the left apicoposterior segmental bronchus of the left upper lobe secondary to the adjacent mass. Mild secretions in the right lower lobe bronchi. No pleural effusion or pneumothorax. Moderate emphysematous changes. The left upper lobe mass abuts the major fissure and measures approximately 6.6 x 4.1 cm on image 40 series 4 stable to slightly increased in size from 07/19/2022.  6 mm nodule in the medial right lung base on image 82 (previously 9 mm on 07/19/2022). No consolidation or interlobular septal thickening. There are a few other scattered smaller and not significantly changed bilateral pulmonary nodules. Soft Tissues/Bones: Bilateral breast implants are in place. No axillary or supraclavicular lymphadenopathy identified. No acute osseous or soft tissue abnormality. No suspicious lytic or blastic osseous lesion. Abdomen/Pelvis: Organs: Extensive hepatic metastases not significantly changed from 07/19/2022. The gallbladder is unremarkable. No biliary ductal dilatation identified. The pancreas and spleen are unremarkable. Diffuse thickening of the right adrenal gland without significant change. Nodular thickening of the left adrenal gland also without significant change. Technically indeterminate 1.6 cm cystic lesion in the upper pole of the left kidney measuring mildly hyperdense to water without significant change. Subcentimeter cystic lesion in the right kidney too small to definitively characterize but likely to represent a benign cyst.  No obstructive uropathy identified. GI/Bowel: Colonic diverticulosis without evidence of acute diverticulitis. No evidence of acute appendicitis. The stomach and small bowel are normal in appearance. No obstruction or wall thickening identified. Pelvis: The urinary bladder is normal in appearance. The uterus and bilateral adnexae are unremarkable. No free fluid in the pelvis.   No pelvic or inguinal lymphadenopathy. Peritoneum/Retroperitoneum: The abdominal aorta is normal in caliber with mild to moderate atherosclerotic vascular disease. There appears to be a filling defect within the inferior vena cava extending to the iliac vein confluence in the pelvis. This is stable to mildly decreased in size from 07/19/2022. Left periaortic node measuring 2.7 x 1.4 cm on axial image 60 (previously 1.9 x 1.2 cm on 07/19/2022). Multiple other enlarged retroperitoneal nodes. No mesenteric lymphadenopathy. No ascites or pneumoperitoneum. Bones/Soft Tissues: Extensive subcutaneous edema. There is an aggressive appearing lytic lesion in the left posterosuperior acetabulum. This is grossly unchanged from the previous exam.  No acute osseous abnormality. CHEST: 1. Resolution of the previously identified pulmonary embolism within the right inter lobar pulmonary artery. 2. Large left upper lobe mass stable to slightly increased in size from 07/19/2022. 3. 6 mm nodule in the medial right lung base significantly decreased in size from the previous exam.  Several other smaller unchanged scattered bilateral pulmonary nodules. 4. Moderate emphysematous changes. ABDOMEN AND PELVIS: 1. Filling defect again seen within the inferior vena cava extending to the confluence of the common iliac veins. This is stable to mildly decreased in size from the previous study. This may represent a bland thrombus or tumor thrombus. 2. Extensive hepatic metastases without significant change. 3. Retroperitoneal lymphadenopathy mildly worsened from the previous study. 4. Unchanged thickening of the bilateral adrenal glands suspicious for metastatic involvement. 5. Aggressive lytic lesion in the left acetabulum without significant change suspicious for a metastatic lesion.      CT CHEST ABDOMEN PELVIS W CONTRAST    Result Date: 7/19/2022  EXAMINATION: CT OF THE CHEST, ABDOMEN, AND PELVIS WITH CONTRAST 7/19/2022 4:04 pm TECHNIQUE: CT of the chest, abdomen and pelvis was performed with the administration of 75 mL Isovue 370 intravenous contrast. Multiplanar reformatted images are provided for review. Automated exposure control, iterative reconstruction, and/or weight based adjustment of the mA/kV was utilized to reduce the radiation dose to as low as reasonably achievable. COMPARISON: Chest radiograph 07/13/2022 HISTORY: Acute abdominal pain and shortness of breath. Recently diagnosed lung cancer. FINDINGS: Chest: Mediastinum: Large filling defect in the right interlobar pulmonary artery with possible smaller filling defects in branch vessels. Heart is not enlarged but there is left ventricular hypertrophy. No pericardial effusion. Thoracic aorta is normal caliber. No pathologically enlarged lymph nodes. Thyroid and esophagus are unremarkable. Lungs/pleura: Moderate upper lobe predominant emphysema. Large left upper lobe mass abutting the fissure and extending to the hilum measures 6.0 x 4.2 cm. A nodule in the medial right lower lobe measures 1.0 x 0.8 cm. Additional scattered tiny nodules noted. No consolidation or pleural effusion. Soft Tissues/Bones: Bilateral saline breast implants. No suspicious osseous lesions. Scattered degenerative changes of the thoracic spine. Abdomen/Pelvis: Organs: Diffuse hepatic metastatic disease with a large confluent mass in the central right hepatic lobe. Adrenals are thickened, right greater than left. Small left renal cyst.  Remaining solid organs and the gallbladder are unremarkable. GI/Bowel: No acute abnormality. Appendix has been removed. Pelvis: Bladder is not well distended. Uterus is unremarkable. No lymphadenopathy or free fluid. Peritoneum/Retroperitoneum: Tumor thrombus extends into the intrahepatic IVC and common iliac veins with possible extension into the external iliac veins; no definite extension into the renal veins. No ascites.   Prominent retrocrural, right upper quadrant, and retroperitoneal lymph nodes. Extensive atherosclerotic disease of the aorta without aneurysm. Incidental retroaortic left renal vein. Bones/Soft Tissues: No suspicious osseous lesions. 1. Large left upper lobe mass abutting the fissure and extending to the hilum measuring 6.0 x 4.2 cm is consistent with known malignancy. 2. Large filling defect in the right interlobar pulmonary artery consistent with acute pulmonary embolism and there may be smaller filling defects in branch vessels. No definite right heart strain. 3. Moderate emphysema. A nodule in the medial right lower lobe measuring 1.0 x 0.8 cm could also be malignant and there are additional scattered tiny nodules. 4. Diffuse hepatic metastatic disease. 5. Tumor thrombus extends into the intrahepatic IVC and common iliac veins with possible extension into the external iliac veins but no definite extension into the renal veins. 6. Prominent retrocrural, right upper quadrant, and retroperitoneal lymph nodes also suspicious for metastatic disease. Findings were given to JAMEY Smith in the ED on 7/19/2022 at 5:38 p.m. MRI BRAIN W WO CONTRAST    Result Date: 7/20/2022  EXAMINATION: MRI OF THE BRAIN WITHOUT AND WITH CONTRAST  7/20/2022 4:22 pm TECHNIQUE: Multiplanar multisequence MRI of the head/brain was performed without and with the administration of intravenous contrast. COMPARISON: None. HISTORY: ORDERING SYSTEM PROVIDED HISTORY: staging lung cancer TECHNOLOGIST PROVIDED HISTORY: Reason for exam:->staging lung cancer Reason for Exam: staging lung cancer FINDINGS: INTRACRANIAL STRUCTURES/VENTRICLES:  There is no acute infarct. No mass effect or midline shift. No evidence of an acute intracranial hemorrhage. The ventricles and sulci are normal in size and configuration. The sellar/suprasellar regions appear unremarkable. The normal signal voids within the major intracranial vessels appear maintained.   No abnormal focus of enhancement is seen within the brain. ORBITS: The visualized portion of the orbits demonstrate no acute abnormality. SINUSES: There is an opacified, expanded right maxillary sinus. This could reflect a mucocele. BONES/SOFT TISSUES: The bone marrow signal intensity appears normal. The soft tissues demonstrate no acute abnormality. No brain metastases identified. Possible right maxillary sinus mucocele. [facility] VASCULAR ACCESS GUIDANCE    Result Date: 7/21/2022  Radiology exam is complete. No Radiologist dictation. Please follow up with ordering provider. 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 Liver synthetic dysfunction   Doubt nephrotic syndrome  Echo July 19 neg for chf that would explain this   Doubt related to immunotherapy complication this soon -as it can cause thyroid dysfunction and thus ?  Edema but highly doubt   Alimta can cause swelling  I will check cosyntropin test -adrenal insuff would be weird with one dose of chemo realted to immunotherapy but will check due to low bp    Hyponatremia-either from her lung or perhapse liver   Siadh would not be surprising     HOld chemo for now     Pt had been offered comfort care and does not want hospice         Abebe Hall MD, MD

## 2022-08-14 NOTE — PROGRESS NOTES
Clinical Pharmacy Note  Renal Dose Adjustment    Bubba Stratton is receiving Cefepime 2 gm IVPB Q12H. This medication is renally eliminated. Based on the patient's Estimated Creatinine Clearance: 29 mL/min (A) (based on SCr of 1.8 mg/dL (H)). and urine output, the dose has been adjusted to Cefepime 1 gm IVPB Q12H per protocol. Pharmacy will continue to monitor and adjust dose as needed for changes in renal function.        Jet Whitaker, [address] [place] [address], PharmD, BCPS  8/14/2022 2:08 PM

## 2022-08-14 NOTE — PROGRESS NOTES
Patient has not voided. Bladder scan shows 308 ml this morning. Patient is resting comfortably in bed. Purwick in place. Will continue to monitor.

## 2022-08-14 NOTE — PROGRESS NOTES
Min: 18  Max: 20  PULSE RANGE: Pulse  Av.6  Min: 82  Max: 94  BLOOD PRESSURE RANGE:  Systolic (91PRU), ATW:89 , Min:90 , KLM:07   ; Diastolic (83WHP), WQR:43, Min:42, Max:61    24HR INTAKE/OUTPUT:    Intake/Output Summary (Last 24 hours) at 2022 1137  Last data filed at 2022 0404  Gross per 24 hour   Intake 218.32 ml   Output 0 ml   Net 218.32 ml         Patient Vitals for the past 96 hrs (Last 3 readings):   Weight   22 0400 156 lb 15.5 oz (71.2 kg)   22 0613 158 lb 11.7 oz (72 kg)   22 1845 156 lb 15.5 oz (71.2 kg)         General: Alert, Awake,   HEENT: Normocephalic, atraumatic, Nose and ears appear externally without deformity, MMM  Neck: No Thyromegaly, Trachea is midline, No Carotid bruit  Eyes: EOMI, JO  Chest: clear to auscultation, no intercostal retractions  CVS: RRR, no murmur, no rub  Abdomen: soft, non tender, no organomegaly, no bruit appreciated  Extremities: 3/4+ edema extending up to buttocks, no cyanosis. Skin: erythema right leg  Neurological: CN intact, no focal motor neurological deficit  Psych: Flat affect; O x 3    Past Medical History:   Diagnosis Date    Headache      Past Surgical History:   Procedure Laterality Date    APPENDECTOMY      PORT SURGERY N/A 2022    PORT A CATHETER PLACEMENT WITH FLUOROSCOPY performed by Denia Thacker MD at [address] [place] [address]       Family History   Problem Relation Age of Onset    Arthritis Mother     High Blood Pressure Mother      Social History     Socioeconomic History    Marital status: Single     Spouse name: Not on file    Number of children: Not on file    Years of education: Not on file    Highest education level: Not on file   Occupational History    Occupation: health tech   Tobacco Use    Smoking status: Every Day     Packs/day: 1.00     Years: 50.00     Pack years: 50.00     Types: Cigarettes    Smokeless tobacco: Never   Substance and Sexual Activity    Alcohol use: No    Drug use:  No Sexual activity: Not on file   Other Topics Concern    Not on file   Social History Narrative    Not on file     Social Determinants of Health     Financial Resource Strain: Not on file   Food Insecurity: Not on file   Transportation Needs: Not on file   Physical Activity: Not on file   Stress: Not on file   Social Connections: Not on file   Intimate Partner Violence: Not on file   Housing Stability: Not on file         Allergies:  No Known Allergies       LAB DATA:    CBC:   Lab Results   Component Value Date/Time    WBC 8.0 08/14/2022 04:30 AM    RBC 3.31 08/14/2022 04:30 AM    HGB 10.0 08/14/2022 04:30 AM    HCT 30.1 08/14/2022 04:30 AM    MCV 90.8 08/14/2022 04:30 AM    MCH 30.1 08/14/2022 04:30 AM    MCHC 33.2 08/14/2022 04:30 AM    RDW 19.0 08/14/2022 04:30 AM     08/14/2022 04:30 AM    MPV 7.3 08/14/2022 04:30 AM     BMP:    Lab Results   Component Value Date/Time     08/14/2022 04:30 AM    K 4.7 08/14/2022 04:30 AM    K 4.9 08/13/2022 06:08 AM    CL 83 08/14/2022 04:30 AM    CO2 20 08/14/2022 04:30 AM    BUN 31 08/14/2022 04:30 AM    CREATININE 1.8 08/14/2022 04:30 AM    CALCIUM 8.5 08/14/2022 04:30 AM    GFRAA 34 08/14/2022 04:30 AM    LABGLOM 28 08/14/2022 04:30 AM    GLUCOSE 82 08/14/2022 04:30 AM       U/A:    Lab Results   Component Value Date/Time    COLORU DARK YELLOW 08/12/2022 12:40 PM    PHUR 5.0 08/12/2022 12:40 PM    WBCUA 3 08/12/2022 12:40 PM    RBCUA 4 08/12/2022 12:40 PM    MUCUS Present 08/12/2022 12:40 PM    TRICHOMONAS None Seen 11/07/2019 04:45 PM    BACTERIA None Seen 08/12/2022 12:40 PM    CLARITYU CLOUDY 08/12/2022 12:40 PM    SPECGRAV 1.031 08/12/2022 12:40 PM    LEUKOCYTESUR Negative 08/12/2022 12:40 PM    UROBILINOGEN 1.0 08/12/2022 12:40 PM    BILIRUBINUR Negative 08/12/2022 12:40 PM    BLOODU Negative 08/12/2022 12:40 PM    GLUCOSEU Negative 08/12/2022 12:40 PM    AMORPHOUS Present 08/12/2022 12:40 PM         IMPRESSION/RECOMMENDATIONS:      Principal Problem: Generalized weakness  Resolved Problems:    * No resolved hospital problems. *    REMY - Worse - in setting of hypotension - need to improve hemodynamics to improve renal perfusion  - Added Midodrine 8/13 and increased dose 8/14     2. Anasarca - Increase Lasix gtt / continue scheduled IV albumin    3. Hyponatremia - FR 1.2 L / day  - U Na < 20 likely because of Hypotension, Urine osmolality 382    4 Proteinuria on UA   - Quantify    5. Stage IV non-small cell lung cancer with widespread metastasis    6. Hypotension - Increase Midodrine    7.  Cellulitis - antibiotics per Medicine    Prognosis Guarded

## 2022-08-15 ENCOUNTER — APPOINTMENT (OUTPATIENT)
Dept: CT IMAGING | Age: 67
DRG: 054 | End: 2022-08-15
Payer: OTHER GOVERNMENT

## 2022-08-15 ENCOUNTER — APPOINTMENT (OUTPATIENT)
Dept: MRI IMAGING | Age: 67
DRG: 054 | End: 2022-08-15
Payer: OTHER GOVERNMENT

## 2022-08-15 LAB
ALBUMIN SERPL-MCNC: 3.2 G/DL (ref 3.4–5)
AMMONIA: 34 UMOL/L (ref 11–51)
ANION GAP SERPL CALCULATED.3IONS-SCNC: 18 MMOL/L (ref 3–16)
APTT: 125.4 SEC (ref 23–34.3)
APTT: 46.4 SEC (ref 23–34.3)
BASOPHILS ABSOLUTE: 0.1 K/UL (ref 0–0.2)
BASOPHILS RELATIVE PERCENT: 0.6 %
BUN BLDV-MCNC: 35 MG/DL (ref 7–20)
CALCIUM SERPL-MCNC: 8.4 MG/DL (ref 8.3–10.6)
CHLORIDE BLD-SCNC: 86 MMOL/L (ref 99–110)
CO2: 21 MMOL/L (ref 21–32)
CORTISOL TOTAL: 23.5 UG/DL
CORTISOL TOTAL: 27.2 UG/DL
CORTISOL TOTAL: 28 UG/DL
CREAT SERPL-MCNC: 2.3 MG/DL (ref 0.6–1.2)
EOSINOPHILS ABSOLUTE: 0 K/UL (ref 0–0.6)
EOSINOPHILS RELATIVE PERCENT: 0.2 %
GFR AFRICAN AMERICAN: 25
GFR NON-AFRICAN AMERICAN: 21
GLUCOSE BLD-MCNC: 77 MG/DL (ref 70–99)
GLUCOSE BLD-MCNC: 77 MG/DL (ref 70–99)
GLUCOSE BLD-MCNC: 85 MG/DL (ref 70–99)
HCT VFR BLD CALC: 26.2 % (ref 36–48)
HCT VFR BLD CALC: 26.6 % (ref 36–48)
HEMOGLOBIN: 8.9 G/DL (ref 12–16)
HEMOGLOBIN: 9.1 G/DL (ref 12–16)
LYMPHOCYTES ABSOLUTE: 1.2 K/UL (ref 1–5.1)
LYMPHOCYTES RELATIVE PERCENT: 13.7 %
MAGNESIUM: 2 MG/DL (ref 1.8–2.4)
MCH RBC QN AUTO: 30.2 PG (ref 26–34)
MCH RBC QN AUTO: 30.5 PG (ref 26–34)
MCHC RBC AUTO-ENTMCNC: 33.8 G/DL (ref 31–36)
MCHC RBC AUTO-ENTMCNC: 34.1 G/DL (ref 31–36)
MCV RBC AUTO: 89.2 FL (ref 80–100)
MCV RBC AUTO: 89.3 FL (ref 80–100)
MONOCYTES ABSOLUTE: 0.5 K/UL (ref 0–1.3)
MONOCYTES RELATIVE PERCENT: 5.7 %
NEUTROPHILS ABSOLUTE: 7.1 K/UL (ref 1.7–7.7)
NEUTROPHILS RELATIVE PERCENT: 79.8 %
PDW BLD-RTO: 18.2 % (ref 12.4–15.4)
PDW BLD-RTO: 18.8 % (ref 12.4–15.4)
PERFORMED ON: NORMAL
PERFORMED ON: NORMAL
PHOSPHORUS: 4.8 MG/DL (ref 2.5–4.9)
PLATELET # BLD: 164 K/UL (ref 135–450)
PLATELET # BLD: 178 K/UL (ref 135–450)
PMV BLD AUTO: 7.4 FL (ref 5–10.5)
PMV BLD AUTO: 7.4 FL (ref 5–10.5)
POTASSIUM SERPL-SCNC: 5 MMOL/L (ref 3.5–5.1)
RBC # BLD: 2.94 M/UL (ref 4–5.2)
RBC # BLD: 2.98 M/UL (ref 4–5.2)
SODIUM BLD-SCNC: 125 MMOL/L (ref 136–145)
URIC ACID, SERUM: 17.4 MG/DL (ref 2.6–6)
WBC # BLD: 8.9 K/UL (ref 4–11)
WBC # BLD: 9.6 K/UL (ref 4–11)

## 2022-08-15 PROCEDURE — 2500000003 HC RX 250 WO HCPCS: Performed by: STUDENT IN AN ORGANIZED HEALTH CARE EDUCATION/TRAINING PROGRAM

## 2022-08-15 PROCEDURE — 6360000002 HC RX W HCPCS: Performed by: INTERNAL MEDICINE

## 2022-08-15 PROCEDURE — 82533 TOTAL CORTISOL: CPT

## 2022-08-15 PROCEDURE — 2060000000 HC ICU INTERMEDIATE R&B

## 2022-08-15 PROCEDURE — 82140 ASSAY OF AMMONIA: CPT

## 2022-08-15 PROCEDURE — P9047 ALBUMIN (HUMAN), 25%, 50ML: HCPCS | Performed by: INTERNAL MEDICINE

## 2022-08-15 PROCEDURE — 2580000003 HC RX 258: Performed by: INTERNAL MEDICINE

## 2022-08-15 PROCEDURE — 2580000003 HC RX 258: Performed by: FAMILY MEDICINE

## 2022-08-15 PROCEDURE — 83735 ASSAY OF MAGNESIUM: CPT

## 2022-08-15 PROCEDURE — 85027 COMPLETE CBC AUTOMATED: CPT

## 2022-08-15 PROCEDURE — 70450 CT HEAD/BRAIN W/O DYE: CPT

## 2022-08-15 PROCEDURE — 85730 THROMBOPLASTIN TIME PARTIAL: CPT

## 2022-08-15 PROCEDURE — 85025 COMPLETE CBC W/AUTO DIFF WBC: CPT

## 2022-08-15 PROCEDURE — 6360000002 HC RX W HCPCS: Performed by: FAMILY MEDICINE

## 2022-08-15 PROCEDURE — 84550 ASSAY OF BLOOD/URIC ACID: CPT

## 2022-08-15 PROCEDURE — 80069 RENAL FUNCTION PANEL: CPT

## 2022-08-15 PROCEDURE — 6360000002 HC RX W HCPCS: Performed by: STUDENT IN AN ORGANIZED HEALTH CARE EDUCATION/TRAINING PROGRAM

## 2022-08-15 PROCEDURE — 70551 MRI BRAIN STEM W/O DYE: CPT

## 2022-08-15 RX ORDER — HEPARIN SODIUM 1000 [USP'U]/ML
5900 INJECTION, SOLUTION INTRAVENOUS; SUBCUTANEOUS ONCE
Status: COMPLETED | OUTPATIENT
Start: 2022-08-15 | End: 2022-08-15

## 2022-08-15 RX ORDER — HEPARIN SODIUM 10000 [USP'U]/100ML
0-3000 INJECTION, SOLUTION INTRAVENOUS CONTINUOUS
Status: DISCONTINUED | OUTPATIENT
Start: 2022-08-15 | End: 2022-08-16

## 2022-08-15 RX ORDER — HEPARIN SODIUM 1000 [USP'U]/ML
40 INJECTION, SOLUTION INTRAVENOUS; SUBCUTANEOUS PRN
Status: DISCONTINUED | OUTPATIENT
Start: 2022-08-15 | End: 2022-08-15

## 2022-08-15 RX ORDER — HEPARIN SODIUM 1000 [USP'U]/ML
80 INJECTION, SOLUTION INTRAVENOUS; SUBCUTANEOUS PRN
Status: DISCONTINUED | OUTPATIENT
Start: 2022-08-15 | End: 2022-08-15

## 2022-08-15 RX ADMIN — CEFEPIME 1000 MG: 1 INJECTION, POWDER, FOR SOLUTION INTRAMUSCULAR; INTRAVENOUS at 06:14

## 2022-08-15 RX ADMIN — HEPARIN SODIUM 1330 UNITS/HR: 10000 INJECTION, SOLUTION INTRAVENOUS at 12:23

## 2022-08-15 RX ADMIN — ALBUMIN (HUMAN) 25 G: 0.25 INJECTION, SOLUTION INTRAVENOUS at 22:05

## 2022-08-15 RX ADMIN — HEPARIN SODIUM 5900 UNITS: 1000 INJECTION INTRAVENOUS; SUBCUTANEOUS at 12:17

## 2022-08-15 RX ADMIN — ONDANSETRON 4 MG: 2 INJECTION INTRAMUSCULAR; INTRAVENOUS at 17:19

## 2022-08-15 RX ADMIN — ALBUMIN (HUMAN) 25 G: 0.25 INJECTION, SOLUTION INTRAVENOUS at 05:15

## 2022-08-15 RX ADMIN — SODIUM CHLORIDE, PRESERVATIVE FREE 10 ML: 5 INJECTION INTRAVENOUS at 08:19

## 2022-08-15 RX ADMIN — ALBUMIN (HUMAN) 25 G: 0.25 INJECTION, SOLUTION INTRAVENOUS at 11:30

## 2022-08-15 RX ADMIN — CEFEPIME 1000 MG: 1 INJECTION, POWDER, FOR SOLUTION INTRAMUSCULAR; INTRAVENOUS at 18:53

## 2022-08-15 RX ADMIN — COSYNTROPIN 250 MCG: 0.25 INJECTION, POWDER, LYOPHILIZED, FOR SOLUTION INTRAMUSCULAR; INTRAVENOUS at 10:44

## 2022-08-15 ASSESSMENT — PAIN SCALES - GENERAL
PAINLEVEL_OUTOF10: 0

## 2022-08-15 ASSESSMENT — PAIN SCALES - WONG BAKER: WONGBAKER_NUMERICALRESPONSE: 0

## 2022-08-15 NOTE — ACP (ADVANCE CARE PLANNING)
Conversation in minutes:  5 minutes    Conversation Outcomes:  [x] ACP discussion completed  [] Existing advance directive reviewed with patient; no changes to patient's previously recorded wishes  [] New Advance Directive completed  [] Portable Do Not Rescitate prepared for Provider review and signature  [] POLST/POST/MOLST/MOST prepared for Provider review and signature    Electronically signed by Nabil Philip RN Case Management [phone] on 8/15/2022 at 11:52 AM

## 2022-08-15 NOTE — CARE COORDINATION
INITIAL CASE MANAGEMENT ASSESSMENT    Reviewed chart, patient confused, spoke to POA/sig other Arianne Allen to assess possible discharge needs. Explained Case Management role/services. Living Situation: Confirmed address, lives with significant other Arianne Allen in a 2 level house (been staying on 1st floor, sleeping on couch); 4 steps with railing to enter    ADLs: Was independent until Friday morning when patient got really weak and was unable to walk. DME: Wheeled walker, cane, shower bench, raised toilet seat    PT/OT Recs: Discharge Recommendations:  Patient would benefit from continued therapy after discharge (3-5x/wk)   PT Equipment Recommendations  Other: if weakness persists may need a w/c or transport chair    Blair Ruano scored a 10/24 on the AM-PAC short mobility form. Current research shows that an AM-PAC score of 17 or less is typically not associated with a discharge to the patient's home setting. Based on the patient's AM-PAC score and their current functional mobility deficits, it is recommended that the patient have 3-5 sessions per week of Physical Therapy at d/c to increase the patient's independence. Please see assessment section for further patient specific details. Occupational Therapy  Therapy attempt. Per RN, spoke not opening eyes or following commands at this time. Will follow and re-attempt later as schedule permits. Refer to the last note for status if pt is discharged. Sumi LO/JON,515     Active Services: None. Declined [facility] when last discharged. Transportation: Patient is an active . Significant other has recently been transporting. Medications: Uses [facility] on [address] -- no issues obtaining/affording medications    PCP: Poonam Sanchez MD      HD/PD: n/a    PLAN/COMMENTS: Arianne Allen patient's significant other & POA would like for patient to return home with home care. Would like [facility] home care, referral made.  Arianne Allen is open to skilled nursing facility if

## 2022-08-15 NOTE — CARE COORDINATION
Patient currently not alert enough to engage in conversation. Tried to call significant other but no answer and voicemail not set up to leave a message. Will attempt to call or meet with family later today as timing permits.   Electronically signed by Oniel Murdock RN Case Management [phone] on 8/15/2022 at 11:26 AM

## 2022-08-15 NOTE — PROGRESS NOTES
Physical Therapy Attempt    Attempted to see patient for therapy. Unable to see patient at this time. RN reports pt is not opening eyes or following commands this morning. Will hold therapy at this time and follow up tomorrow pending pt's status.     Tierra Jones,   [facility]

## 2022-08-15 NOTE — PROGRESS NOTES
Hospitalist Progress Note      PCP: Khadar Ha MD    Date of Admission: 8/12/2022    Chief Complaint: lethargy and generalized weakness. Hospital Course:    64-year-old female patient with a past medical history of metastatic non-small cell lung cancer (recently diagnosed) , complicated by pulmonary embolism and IVC thrombosis. Presented to emergency with worsening lethargy, was found to have REMY, hyponatremia. Hospital stay was complicated by metabolic encephalopathy. Subjective:   Patient does not respond to questions today, seemed agitated this morning, dry heaving. Medications:  Reviewed    Infusion Medications    heparin (PORCINE) Infusion 1,330 Units/hr (08/15/22 1224)    sodium chloride       Scheduled Medications    midodrine  10 mg Oral TID WC    cefepime  1,000 mg IntraVENous Q12H    albumin human  25 g IntraVENous Q8H    sodium chloride flush  10 mL IntraVENous 2 times per day    morphine  15 mg Oral BID    melatonin  9 mg Oral Nightly    [Held by provider] apixaban  5 mg Oral BID    folic acid  1 mg Oral Daily    naloxegol  12.5 mg Oral QAM AC    sennosides-docusate sodium  1 tablet Oral Daily     PRN Meds: sodium chloride flush, sodium chloride, promethazine **OR** ondansetron, magnesium hydroxide, iopamidol, oxyCODONE, oxyCODONE-acetaminophen, SUMAtriptan      Intake/Output Summary (Last 24 hours) at 8/15/2022 1503  Last data filed at 8/15/2022 1224  Gross per 24 hour   Intake 549.6 ml   Output 860 ml   Net -310.4 ml       Physical Exam Performed:    BP (!) 89/53   Pulse 90   Temp 97.5 °F (36.4 °C) (Axillary)   Resp 22   Ht 5' 7\" (1.702 m)   Wt 162 lb 14.7 oz (73.9 kg)   SpO2 95%   BMI 25.52 kg/m²     General appearance: Seems very uncomfortable ,   Not responding to questions  HEENT: Pupils equal, round, and reactive to light. Conjunctivae/corneas clear. Neck: Supple, with full range of motion. No jugular venous distention. Trachea midline.   Respiratory:  Normal respiratory effort. Clear to auscultation, bilaterally without Rales/Wheezes/Rhonchi. Cardiovascular: Regular rate and rhythm with normal S1/S2 without murmurs, rubs or gallops. Abdomen: Soft, non-tender, non-distended with normal bowel sounds. Musculoskeletal: Severe bilateral lower limb edema   skin: Skin color, texture, turgor normal.  No rashes or lesions. Neurologic: Not responding to questions   capillary Refill: Brisk,3 seconds, normal   Peripheral Pulses: +2 palpable, equal bilaterally       Labs:   Recent Labs     08/14/22  0430 08/15/22  0520 08/15/22  1206   WBC 8.0 8.9 9.6   HGB 10.0* 9.1* 8.9*   HCT 30.1* 26.6* 26.2*    178 164     Recent Labs     08/13/22  0608 08/14/22  0430 08/15/22  0520   * 125* 125*   K 4.9 4.7 5.0   CL 83* 83* 86*   CO2 23 20* 21   BUN 24* 31* 35*   CREATININE 1.3* 1.8* 2.3*   CALCIUM 9.0 8.5 8.4   PHOS  --  5.0* 4.8     No results for input(s): AST, ALT, BILIDIR, BILITOT, ALKPHOS in the last 72 hours. No results for input(s): INR in the last 72 hours. No results for input(s): Mohini Parisian in the last 72 hours. Urinalysis:      Lab Results   Component Value Date/Time    NITRU Negative 08/12/2022 12:40 PM    [address] 08/12/2022 12:40 PM    BACTERIA None Seen 08/12/2022 12:40 PM    RBCUA 4 08/12/2022 12:40 PM    BLOODU Negative 08/12/2022 12:40 PM    SPECGRAV 1.031 08/12/2022 12:40 PM    GLUCOSEU Negative 08/12/2022 12:40 PM       Radiology:  CT HEAD WO CONTRAST   Preliminary Result   1. No acute intracranial abnormality. 2. Chronic right maxillary sinusitis, unchanged. 3. Intracranial metastatic lesions are not well seen on this CT. Follow-up   MRI of the brain without and with IV contrast may be of benefit for further   evaluation. Results were discussed with Dr. Maria White at 1419 hours on 08/15/2022. CT CHEST ABDOMEN PELVIS W CONTRAST   Final Result   CHEST:      1.  Resolution of the previously identified pulmonary embolism within the right inter lobar pulmonary artery. 2. Large left upper lobe mass stable to slightly increased in size from   07/19/2022. 3. 6 mm nodule in the medial right lung base significantly decreased in size   from the previous exam.  Several other smaller unchanged scattered bilateral   pulmonary nodules. 4. Moderate emphysematous changes. ABDOMEN AND PELVIS:      1. Filling defect again seen within the inferior vena cava extending to the   confluence of the common iliac veins. This is stable to mildly decreased in   size from the previous study. This may represent a bland thrombus or tumor   thrombus. 2. Extensive hepatic metastases without significant change. 3. Retroperitoneal lymphadenopathy mildly worsened from the previous study. 4. Unchanged thickening of the bilateral adrenal glands suspicious for   metastatic involvement. 5. Aggressive lytic lesion in the left acetabulum without significant change   suspicious for a metastatic lesion. XR CHEST PORTABLE   Final Result   Stable left upper lobe mass. No acute cardiopulmonary findings                 Assessment/Plan:    Active Hospital Problems    Diagnosis     Generalized weakness [R53.1]      Priority: Medium     Acute kidney injury. Anasarca  Patient presented to emergency with severe lethargy in the setting of metastatic stage IV non-small cell lung cancer. Creatinine worsening 2.3 today. Was initially treated with Lasix infusion that was held. REMY may be worsening due to inferior vena cava thrombosis (stable from previous scan). Stage IV non-small cell lung cancer  Metastatic with extensive metastasis to the liver, bone, retroperitoneal lymph nodes. Patient had received 1 cycle of carboplatin / Pemetrexed + Keytruda. Appreciate oncology input. Pulmonary embolism  IVC thrombosis  Pulm embolism had resolved on CT scan, IVC thrombosis stable.   Patient had been on Eliquis however cannot take oral due to worsening mental status. Will start on heparin infusion    Acute metabolic encephalopathy  Patient with worsening mental status, CT scan done 8/15  Showing no acute abnormality. Will consider MRI to rule out metastasis. Goals of care  Extensive discussion with the patient's partner Gene Carlson. At this time they wish for patient to stay full code, they understand that intubation will not likely prolong prognosis that is already very poor. Patient's partner requested me to discuss the case with a family friend Dr Blossom Abarca ( who was contacted and updated ). Highly appreciate palliative input    DVT Prophylaxis: heparin infusion . Diet: ADULT DIET;  Regular; 1200 ml  Code Status: Full Code    PT/OT Eval Status:pending clinical improvement      Dispo - pending clinical improvement      Jayla Cadena MD

## 2022-08-15 NOTE — PROGRESS NOTES
Occupational Therapy  Therapy attempt. Per RN, spoke not opening eyes or following commands at this time. Will follow and re-attempt later as schedule permits. Refer to the last note for status if pt is discharged.   Sumi LO/JON,515

## 2022-08-15 NOTE — PROGRESS NOTES
Nephrology Progress Note   [facility]. [facility]      Chief Complaint: Anasarca/weakness    Reason for Consultation: Anasarca and Hyponatremia    History of Present Illness: Ms Dasha Pollack is a 75 yo female with past medical history significant for stage IV non-small cell lung cancer with widespread metastasis and recent diagnosis of right-sided pulmonary embolism on Eliquis who presented to hospital with generalized weakness & fatigue. She has also noticed worsening swelling of lower extremities extending up her thighs. Routine blood work revealed serum sodium level of 123. Patient cannot accurately tell me how much fluid she's drinking on a daily basis.  at bedside    Subjective:    Appears ill; in bed Not responding to commands    ROS remains hypotensive Not much UOP on Lasix gtt  Unable to take Midodrine PO    Scheduled Meds:   heparin (porcine)  5,900 Units IntraVENous Once    midodrine  10 mg Oral TID WC    cefepime  1,000 mg IntraVENous Q12H    albumin human  25 g IntraVENous Q8H    sodium chloride flush  10 mL IntraVENous 2 times per day    morphine  15 mg Oral BID    melatonin  9 mg Oral Nightly    [Held by provider] apixaban  5 mg Oral BID    folic acid  1 mg Oral Daily    naloxegol  12.5 mg Oral QAM AC    sennosides-docusate sodium  1 tablet Oral Daily        heparin (PORCINE) Infusion      furosemide (LASIX) 1mg/ml infusion Stopped (08/15/22 1110)    sodium chloride         PRN Meds:.sodium chloride flush, sodium chloride, promethazine **OR** ondansetron, magnesium hydroxide, iopamidol, oxyCODONE, oxyCODONE-acetaminophen, SUMAtriptan    Physical Exam:    TEMPERATURE:  Current - Temp: 97.8 °F (36.6 °C);  Max - Temp  Av °F (36.7 °C)  Min: 97.8 °F (36.6 °C)  Max: 98.4 °F (36.9 °C)  RESPIRATIONS RANGE: Resp  Av  Min: 16  Max: 20  PULSE RANGE: Pulse  Av.7  Min: 78  Max: 93  BLOOD PRESSURE RANGE:  Systolic (71GYY), YL , Min:86 , ELM:89   ; Diastolic (24TXF), GID:54, Min:33, Max:52    24HR INTAKE/OUTPUT:    Intake/Output Summary (Last 24 hours) at 8/15/2022 1145  Last data filed at 8/15/2022 1131  Gross per 24 hour   Intake 461.24 ml   Output 785 ml   Net -323.76 ml       Patient Vitals for the past 96 hrs (Last 3 readings):   Weight   08/15/22 0400 162 lb 14.7 oz (73.9 kg)   08/14/22 0400 156 lb 15.5 oz (71.2 kg)   08/13/22 0613 158 lb 11.7 oz (72 kg)       General: not responding to commands  HEENT: Normocephalic, atraumatic  Neck: No Thyromegaly, Trachea is midline, No Carotid bruit  Eyes: EOMI, JO  Chest: clear to auscultation, no intercostal retractions  CVS: RRR, no murmur, no rub  Abdomen: soft, non tender, no organomegaly, no bruit appreciated  Extremities: ++ edema extending up to buttocks, no cyanosis. erythema RLE noted  Skin: erythema right leg  Neurological: not responding to commands      Past Medical History:   Diagnosis Date    Headache      Past Surgical History:   Procedure Laterality Date    APPENDECTOMY      PORT SURGERY N/A 7/21/2022    PORT A CATHETER PLACEMENT WITH FLUOROSCOPY performed by Emanuel Gardner MD at [facility]       Family History   Problem Relation Age of Onset    Arthritis Mother     High Blood Pressure Mother      Social History     Socioeconomic History    Marital status: Single     Spouse name: Not on file    Number of children: Not on file    Years of education: Not on file    Highest education level: Not on file   Occupational History    Occupation: health tech   Tobacco Use    Smoking status: Every Day     Packs/day: 1.00     Years: 50.00     Pack years: 50.00     Types: Cigarettes    Smokeless tobacco: Never   Substance and Sexual Activity    Alcohol use: No    Drug use: No    Sexual activity: Not on file   Other Topics Concern    Not on file   Social History Narrative    Not on file     Social Determinants of Health     Financial Resource Strain: Not on file   Food Insecurity: Not on file   Transportation Needs: Not on file   Physical poor Not a candidate for RRT Agree with palliative care consult    2. Anasarca - continue IV Albumin     3. Hyponatremia -Na+ stable 125 meq Not taking PO at all  likely due to poor solute intake  - U Na < 20 likely because of Hypotension, Urine osmolality 382    4. Stage IV non-small cell lung cancer with widespread metastasis    5.  Hypotension -unable to take Midodrine    6  Cellulitis - antibiotics per Medicine    Prognosis Guarded

## 2022-08-15 NOTE — PROGRESS NOTES
Clinical Pharmacy Note  Heparin Dosing       Lab Results   Component Value Date/Time    APTT 125.4 08/15/2022 06:45 PM     Lab Results   Component Value Date/Time    HGB 8.9 08/15/2022 12:06 PM    HCT 26.2 08/15/2022 12:06 PM     08/15/2022 12:06 PM    INR 3.04 08/12/2022 12:40 PM       Current Infusion Rate: 1330 units/hr    Plan:  Bolus: 0000 units  Rate: 1180 units/hr  Next aPTT: 0200    Pharmacy will continue to monitor and adjust based on aPTT results.

## 2022-08-15 NOTE — PROGRESS NOTES
Physician Progress Note      PATIENTGuss Mcardle  CSN #:                  [id number]  :                       1955  ADMIT DATE:       2022 11:48 AM  DISCH DATE:  RESPONDING  PROVIDER #:        Leo Molina          QUERY TEXT:    Pt admitted with hyponatremia and has pulmonary embolism documented. If   possible, please document in progress notes and discharge summary if you are   evaluating and/or treating any of the following: The medical record reflects the following:  Risk Factors: Recent hospital admission for acute PE on 22  Clinical Indicators: Per H&P on 22-Recent diagnosis of PE on Eliquis. Treatment: Eliquis oral    Thank You,  Bard Ayleen JOYA BSN CDS CRCR  [email or web address]  Options provided:  -- Acute pulmonary embolism  -- Recurrent acute pulmonary embolism  -- Chronic pulmonary embolism  -- Other - I will add my own diagnosis  -- Disagree - Not applicable / Not valid  -- Disagree - Clinically unable to determine / Unknown  -- Refer to Clinical Documentation Reviewer    PROVIDER RESPONSE TEXT:    This patient has acute pulmonary embolism.     Query created by: Patience Paredes on 8/15/2022 7:19 AM      Electronically signed by:  Leo Molina 8/15/2022 3:29 PM

## 2022-08-15 NOTE — PROGRESS NOTES
Provider on call notified of pt's hypotension (89/39) and of pt's poor urine output. No orders received at this time.

## 2022-08-15 NOTE — PROGRESS NOTES
Clinical Pharmacy Note  Heparin Dosing Consult    Lucia Peace is a 74 y.o. female ordered heparin per high dose nomogram by Dr. Ondina Rosenberg. Lab Results   Component Value Date/Time    APTT 46.4 08/15/2022 12:06 PM     Lab Results   Component Value Date/Time    HGB 8.9 08/15/2022 12:06 PM    HCT 26.2 08/15/2022 12:06 PM     08/15/2022 12:06 PM    INR 3.04 08/12/2022 12:40 PM       Ht Readings from Last 1 Encounters:   08/12/22 5' 7\" (1.702 m)        Wt Readings from Last 1 Encounters:   08/15/22 162 lb 14.7 oz (73.9 kg)        Assessment/Plan:  Initial bolus: 5900 units  Initial infusion rate: 1330 units/hr  Next aPTT: 1830 8/15/20    Pharmacy will continue to monitor adjust heparin based on aPTT results using nomogram below:     VTE/DVT/PE Heparin Nomogram     Initial Bolus: 80 units/kg Max Bolus: 10,000 units       Initial Rate: 18 units/kg/hr Max Initial Rate: 2,750 units/hr     aPTT < 59    Heparin 80 units/kg bolus Increase infusion by 4 units/kg/hr        (maximum 10,000 units)   aPTT 59-72.9    Heparin 40 units/kg bolus Increase infusion by 2 units/kg/hr        (maximum 5,000 units)   aPTT      No bolus   No change   aPTT 102.1-109  No bolus   Decrease infusion by 1 units/kg/hr   aPTT 109.1-122.9   No bolus   Decrease infusion by 2 units/kg/hr   aPTT > 123     Hold heparin for 1 hour Decrease infusion by 3 units/kg/hr    Obtain aPTT 6 hours after initial bolus and 6 hours after any dose change until two consecutive therapeutic aPTTs are achieved - then daily.

## 2022-08-15 NOTE — CONSULTS
PALLIATIVE MEDICINE CONSULTATION     Patient name:Shameka Goodman   KYL:[id number]    :1955  Room/Bed:H1P-2130/5250-01   LOS: 3 days         Date of consult:8/15/2022    Consult Information    Inpatient consult to Palliative Care  Consult performed by: LISET Nichols CNP  Consult ordered by: Pino Summers MD      Inpatient consult to Palliative Care  Consult performed by: LISET Nichols CNP  Consult ordered by: Heather Weathers MD       Reason for Consult: Goals of Care, Code Status. , 30 day readmit. ASSESSMENT/RECOMMENDATIONS     74 y.o. female with AMS and debility. Symptom Management:  AMS: Patient was mostly unresponsive during my visit today. Patient appeared to be disoriented x 4 today. Patient did not appear to be decisional today. Debility: Patient was mostly unresponsive during my visit today. Patient has stage 4 lung cancer. Patient appeared to be very weak while lying in the bed today. Patient currently too lethargic to consume anything by mouth and is NPO. Patient is currently total care for her ADL's. Goals of Care: Met patient and her domestic partner Jd Salazar (he is also the patient's POA) at the bedside. Patient was mostly unresponsive during my visit and appeared to be disoriented x 4. Patient did not appear to be decisional today. Reviewed patient's current health status, goals of care and code status. Despite patient's poor prognosis, Jd Salazar indicated he is not interested in hospice services for the patient at this time. Jd Salazar would like for the patient to continue with aggressive treatment. Code status was reviewed and Diogeneslindsaypaulina Salazar indicated that it is the patient's wishes to remain a Full Code. Patient/Family Goals of Care :    8/15 - Met patient and her domestic partner Jd Salazar (he is also the patient's POA) at the bedside. Patient was mostly unresponsive during my visit and appeared to be disoriented x 4.   Patient did not appear to be decisional today. Reviewed patient's current health status, goals of care and code status. Despite patient's poor prognosis, Madyson Bello indicated he is not interested in hospice services for the patient at this time. Madyson Bello would like for the patient to continue with aggressive treatment. Code status was reviewed and Madyson Bello indicated that it is the patient's wishes to remain a Full Code. Disposition/Discharge Plan:   Pending. Advance Directives:  Surrogate Decision Maker: Madyson Bello (domestic partner and POA). POA paperwork on file in hard chart. Code status:  Full Code    Case discussed with: patient, floor RN, Jose Guadalupesean Ace (domestic partner and POA). Thank you for allowing us to participate in the care of this patient. HISTORY     CC: AMS. HPI: The patient is a 74 y.o. female with a past medical history of migraines, panlobular emphysema, skin cancer and stage 4 non-small cell lung cancer who presented to [facility] with fatigue and generalized weakness. Patient was admitted with anasarca, dehydration, generalized weakness and cellulitis. Palliative Medicine SymptomScreening/ROS:    Review of Systems   Unable to perform ROS: Mental status change   All other systems reviewed and are negative. Patient unable to complete full ROS due to current cognitive status. Information that is obtained from nursing and chart. Palliative Performance Scale:     [] 60%  Amb reduced; Sig dz. Can't do hobbies/housework; Intake normal or reduced, Occasional assist; LOC full/confusion   [] 50%  Mainly sit/lie; Extensive disease. Mainly assist, Intake normal or reduced; Occasional assist; LOC full/confusion   [] 40%  Mainly in bed; Extensive disease; Mainly assist; Intake normal or reduced; Occasional assist; LOC full/confusion   [x] 30%  Bed bound, Extensive disease; Total care; Intake reduced; LOC full/confusion   [] 20%  Bed bound; Extensive disease; Total care;  Intake minimal; Drowsy/coma   [] 10%  Bed bound; Extensive disease; Total care; Mouth care only; Drowsy/coma   []  0%   Death     Home med list and hospital medications reviewed in chart as of 8/15/2022     EXAM     Vitals:    08/15/22 1226   BP: (!) 89/53   Pulse: 90   Resp: 22   Temp: 97.5 °F (36.4 °C)   SpO2: 95%       Physical Exam  Vitals reviewed. Constitutional:       Appearance: She is ill-appearing. HENT:      Head: Normocephalic and atraumatic. Nose: Nose normal.   Eyes:      Comments: Unable to assess, patient could not open her eyes during my visit today. Cardiovascular:      Rate and Rhythm: Normal rate. Pulses: Normal pulses. Comments: hypotensive  Pulmonary:      Comments: Breath sounds diminished bilaterally. Abdominal:      General: Bowel sounds are normal.      Palpations: Abdomen is soft. Musculoskeletal:      Cervical back: Neck supple. Right lower leg: Edema (3+) present. Left lower leg: Edema (3+) present. Skin:     General: Skin is warm and dry. Coloration: Skin is pale. Neurological:      Comments: Patient appeared to be disoriented x 4. Patient did not appear to be decisional today. Psychiatric:      Comments: Patient was mostly unresponsive during my visit.            Current labs in the epic chart reviewed as of 8/15/2022   Review of previous notes, admits, labs, radiology and testing relevant to this consult done in this chart today 8/15/2022      Total time: 82 minutes  >50% of time spent counseling patient at bedside or POA/family member if applicable , reviewing information and discussing care, coordinating with care team  Signed By: Electronically signed by LISET Crane CNP on 8/15/2022 at 3:46 PM  Palliative Medicine   [phone]    August 15, 2022

## 2022-08-15 NOTE — PROGRESS NOTES
Bedside evaluation: Called by RN because the patient will not open her eyes and will not take her medications. Hospital day #2: Patient admitted for fatigue and weakness, has a pulmonary embolism and NSCL lung cancer with hepatic metastasis. Apparently on day shift the patient seemed to be pretty sleepy. She last received morphine around 9:00 this morning. This evening patient is intentionally keeping her eyes closed and moves around in bed purposely and pulling her covers up but she will not open her eyes speak to the nurse or take her medications. On my exam: Respirations are easy regular nonlabored. Skin is warm and dry. Temperature this evening 98.4, respiratory rate 16-20, pulse 70s to 80s and blood pressure most recently at 11:30 PM is 96/44. Room air pulse oximetry 94%  When verbally spoken to by calling patient's name she will acknowledge by \"umhum\" but will not open her mouth or her eyes. Her eyes are intentionally squinched down tight. When I did a sternal rub or nailbed pressure she purposefully spoke and said \"ouch \" and pulled away. She maneuvered her blankets purposely by pulling them up towards her chest.  When I attempted to open her eyes ; she purposely squinched the  eyelids down even tighter. When I put the straw up next to her lips she purposely squinched her lips down tight to not open them and she purposefully pushed the cup away. I do not appreciate any facial drooping    Plan: Obtain ammonia level. I have asked the nurse to document that evening medications were not given due to patient's uncooperative state. I think we need to continue to reevaluate as this very well may be a conversion behavior.   It does seem to be purposeful and intentional.

## 2022-08-15 NOTE — PROGRESS NOTES
Hematology Oncology Daily Progress Note    Admit Date: 8/12/2022  Hospital day several    Subjective:     Patient lethargic this AM.   Medication side effects: none    Scheduled Meds:   cosyntropin  250 mcg IntraVENous Once    midodrine  10 mg Oral TID WC    cefepime  1,000 mg IntraVENous Q12H    albumin human  25 g IntraVENous Q8H    sodium chloride flush  10 mL IntraVENous 2 times per day    morphine  15 mg Oral BID    melatonin  9 mg Oral Nightly    apixaban  5 mg Oral BID    folic acid  1 mg Oral Daily    naloxegol  12.5 mg Oral QAM AC    sennosides-docusate sodium  1 tablet Oral Daily     Continuous Infusions:   furosemide (LASIX) 1mg/ml infusion 4 mg/hr (08/14/22 1903)    sodium chloride       PRN Meds:sodium chloride flush, sodium chloride, promethazine **OR** ondansetron, magnesium hydroxide, iopamidol, oxyCODONE, oxyCODONE-acetaminophen, SUMAtriptan    Review of Systems  Unable to asses    Objective:     Patient Vitals for the past 8 hrs:   BP Temp Temp src Pulse Resp SpO2 Weight   08/15/22 0400 -- -- -- -- -- -- 162 lb 14.7 oz (73.9 kg)   08/15/22 0359 (!) 92/50 98 °F (36.7 °C) Axillary 85 16 98 % --   08/15/22 0130 (!) 92/52 -- -- -- -- -- --   08/15/22 0030 (!) 89/39 -- -- 78 -- -- --     I/O last 3 completed shifts: In: 81.6 [P.O.:60; I.V.:21.6]  Out: 510 [Urine:510]  No intake/output data recorded.     BP (!) 92/50   Pulse 85   Temp 98 °F (36.7 °C) (Axillary)   Resp 16   Ht 5' 7\" (1.702 m)   Wt 162 lb 14.7 oz (73.9 kg)   SpO2 98%   BMI 25.52 kg/m²         Head:    Normocephalic, without obvious abnormality, atraumatic   Eyes:    PERRL, conjunctiva/corneas clear, EOM's intact, fundi     benign, both eyes        Ears:    Normal TM's and external ear canals, both ears   Nose:   Nares normal, septum midline, mucosa normal, no drainage    or sinus tenderness   Throat:   Lips, mucosa, and tongue normal; teeth and gums normal   Neck:   Supple, symmetrical, trachea midline, no adenopathy; thyroid:  No enlargement/tenderness/nodules; no carotid    bruit or JVD   Back:     Symmetric, no curvature, ROM normal, no CVA tenderness   Lungs:     Clear to auscultation bilaterally, respirations unlabored   Chest wall:    No tenderness or deformity   Heart:    Regular rate and rhythm, S1 and S2 normal, no murmur, rub   or gallop   Abdomen:     Soft, non-tender, bowel sounds active all four quadrants,     no masses, no organomegaly           Extremities:   Extremities normal, atraumatic, no cyanosis or edema   Pulses:   2+ and symmetric all extremities   Skin:   Skin color, texture, turgor normal, no rashes or lesions   Lymph nodes:   Cervical, supraclavicular, and axillary nodes normal   Neurologic:   Pt lethargic       Data ReviewCBC:   Lab Results   Component Value Date/Time    WBC 8.9 08/15/2022 05:20 AM    RBC 2.98 08/15/2022 05:20 AM       Assessment:     Principal Problem:    Generalized weakness  Resolved Problems:    * No resolved hospital problems. *      Plan:     1. Stage IV non-small cell lung cancer. Although she just recently started treatment, she is not doing well. Her prognosis is poor. I will consult palliative care to discuss again options with her and her . 2.  Cellulitis. It does not appear to be improving. I would consider an infectious disease consult. 3.  Acute kidney injury. Nephrology has been consulted. Perhaps this is due to the Lasix drip. 4.  IVC thrombosis. I would consider switching Eliquis to heparin due to her worsening renal failure. 5.  Lethargy. If her mental status does not improve over the next few hours, I would get a head CT.         Electronically signed by Jason Chamberlain MD on 8/15/2022 at 7:40 AM

## 2022-08-16 VITALS
BODY MASS INDEX: 24.75 KG/M2 | DIASTOLIC BLOOD PRESSURE: 47 MMHG | SYSTOLIC BLOOD PRESSURE: 77 MMHG | RESPIRATION RATE: 19 BRPM | HEART RATE: 89 BPM | WEIGHT: 157.7 LBS | HEIGHT: 67 IN | TEMPERATURE: 96.3 F | OXYGEN SATURATION: 94 %

## 2022-08-16 LAB
ALBUMIN SERPL-MCNC: 3.4 G/DL (ref 3.4–5)
ANION GAP SERPL CALCULATED.3IONS-SCNC: 19 MMOL/L (ref 3–16)
APTT: >180 SEC (ref 23–34.3)
APTT: >180 SEC (ref 23–34.3)
BUN BLDV-MCNC: 37 MG/DL (ref 7–20)
CALCIUM SERPL-MCNC: 8.2 MG/DL (ref 8.3–10.6)
CHLORIDE BLD-SCNC: 86 MMOL/L (ref 99–110)
CO2: 19 MMOL/L (ref 21–32)
CREAT SERPL-MCNC: 2.9 MG/DL (ref 0.6–1.2)
GFR AFRICAN AMERICAN: 19
GFR NON-AFRICAN AMERICAN: 16
GLUCOSE BLD-MCNC: 60 MG/DL (ref 70–99)
GLUCOSE BLD-MCNC: 66 MG/DL (ref 70–99)
GLUCOSE BLD-MCNC: 71 MG/DL (ref 70–99)
GLUCOSE BLD-MCNC: 83 MG/DL (ref 70–99)
GLUCOSE BLD-MCNC: 88 MG/DL (ref 70–99)
MAGNESIUM: 1.9 MG/DL (ref 1.8–2.4)
PERFORMED ON: ABNORMAL
PERFORMED ON: NORMAL
PHOSPHORUS: 4.8 MG/DL (ref 2.5–4.9)
POTASSIUM SERPL-SCNC: 5.1 MMOL/L (ref 3.5–5.1)
REASON FOR REJECTION: NORMAL
REJECTED TEST: NORMAL
SODIUM BLD-SCNC: 124 MMOL/L (ref 136–145)
URIC ACID, SERUM: 18.5 MG/DL (ref 2.6–6)

## 2022-08-16 PROCEDURE — P9047 ALBUMIN (HUMAN), 25%, 50ML: HCPCS | Performed by: INTERNAL MEDICINE

## 2022-08-16 PROCEDURE — 2580000003 HC RX 258: Performed by: STUDENT IN AN ORGANIZED HEALTH CARE EDUCATION/TRAINING PROGRAM

## 2022-08-16 PROCEDURE — 2580000003 HC RX 258: Performed by: FAMILY MEDICINE

## 2022-08-16 PROCEDURE — 6360000002 HC RX W HCPCS: Performed by: FAMILY MEDICINE

## 2022-08-16 PROCEDURE — 85730 THROMBOPLASTIN TIME PARTIAL: CPT

## 2022-08-16 PROCEDURE — 6360000002 HC RX W HCPCS: Performed by: INTERNAL MEDICINE

## 2022-08-16 PROCEDURE — 2580000003 HC RX 258: Performed by: INTERNAL MEDICINE

## 2022-08-16 PROCEDURE — 6360000002 HC RX W HCPCS: Performed by: STUDENT IN AN ORGANIZED HEALTH CARE EDUCATION/TRAINING PROGRAM

## 2022-08-16 PROCEDURE — 94760 N-INVAS EAR/PLS OXIMETRY 1: CPT

## 2022-08-16 PROCEDURE — 84550 ASSAY OF BLOOD/URIC ACID: CPT

## 2022-08-16 PROCEDURE — 83735 ASSAY OF MAGNESIUM: CPT

## 2022-08-16 PROCEDURE — 80069 RENAL FUNCTION PANEL: CPT

## 2022-08-16 RX ORDER — 0.9 % SODIUM CHLORIDE 0.9 %
500 INTRAVENOUS SOLUTION INTRAVENOUS ONCE
Status: COMPLETED | OUTPATIENT
Start: 2022-08-16 | End: 2022-08-16

## 2022-08-16 RX ORDER — LORAZEPAM 2 MG/ML
0.5 INJECTION INTRAMUSCULAR EVERY 4 HOURS PRN
Status: DISCONTINUED | OUTPATIENT
Start: 2022-08-16 | End: 2022-08-16 | Stop reason: HOSPADM

## 2022-08-16 RX ORDER — LORAZEPAM 2 MG/ML
0.5 INJECTION INTRAMUSCULAR EVERY 4 HOURS
Status: DISCONTINUED | OUTPATIENT
Start: 2022-08-16 | End: 2022-08-16

## 2022-08-16 RX ORDER — DEXTROSE AND SODIUM CHLORIDE 5; .45 G/100ML; G/100ML
INJECTION, SOLUTION INTRAVENOUS CONTINUOUS
Status: DISCONTINUED | OUTPATIENT
Start: 2022-08-16 | End: 2022-08-16

## 2022-08-16 RX ORDER — DEXTROSE MONOHYDRATE 50 MG/ML
INJECTION, SOLUTION INTRAVENOUS CONTINUOUS
Status: DISCONTINUED | OUTPATIENT
Start: 2022-08-16 | End: 2022-08-16

## 2022-08-16 RX ADMIN — ALBUMIN (HUMAN) 25 G: 0.25 INJECTION, SOLUTION INTRAVENOUS at 05:55

## 2022-08-16 RX ADMIN — DEXTROSE AND SODIUM CHLORIDE: 5; 450 INJECTION, SOLUTION INTRAVENOUS at 09:40

## 2022-08-16 RX ADMIN — SODIUM CHLORIDE, PRESERVATIVE FREE 10 ML: 5 INJECTION INTRAVENOUS at 10:17

## 2022-08-16 RX ADMIN — HYDROMORPHONE HYDROCHLORIDE 0.5 MG: 1 INJECTION, SOLUTION INTRAMUSCULAR; INTRAVENOUS; SUBCUTANEOUS at 12:57

## 2022-08-16 RX ADMIN — SODIUM CHLORIDE 500 ML: 9 INJECTION, SOLUTION INTRAVENOUS at 04:32

## 2022-08-16 RX ADMIN — CEFEPIME 1000 MG: 1 INJECTION, POWDER, FOR SOLUTION INTRAMUSCULAR; INTRAVENOUS at 05:47

## 2022-08-16 RX ADMIN — DEXTROSE MONOHYDRATE: 50 INJECTION, SOLUTION INTRAVENOUS at 05:46

## 2022-08-16 ASSESSMENT — PAIN SCALES - GENERAL
PAINLEVEL_OUTOF10: 0
PAINLEVEL_OUTOF10: 8

## 2022-08-16 ASSESSMENT — PAIN SCALES - WONG BAKER
WONGBAKER_NUMERICALRESPONSE: 0

## 2022-08-16 NOTE — PROGRESS NOTES
Informed MD of pt's hypoglycemia this AM. B.     MD ordered D5 at 75 mL/ hr and scheduled glucose checks

## 2022-08-16 NOTE — PROGRESS NOTES
Clinical Pharmacy Note  Heparin Dosing       Lab Results   Component Value Date/Time    APTT >180.0 08/16/2022 05:00 AM     Lab Results   Component Value Date/Time    HGB 8.9 08/15/2022 12:06 PM    HCT 26.2 08/15/2022 12:06 PM     08/15/2022 12:06 PM    INR 3.04 08/12/2022 12:40 PM       Current Infusion Rate: 1180 units/hr    Plan:  Hold heparin drip 1 hour  Rate: 960 units/hr  Next aPTT: 1400 8/16/22    Pharmacy will continue to monitor and adjust based on aPTT results.

## 2022-08-16 NOTE — PROGRESS NOTES
Pt discharged to [place] [address] at 1640. AVS completed and sent with EMS. Pt discharged with her Port and beard still in place. Pt's  Hany Szymanski took all personal belongings home including pt's cell phone, ear rings, clothing, and dentures.

## 2022-08-16 NOTE — PROGRESS NOTES
Pt remaining very lethargic tonight. Not following commands, opening her eyes, or attempting to communicate. Will groan while being cleaned / turned. Blood pressure remains hypotensive with albumin infusing running at this time. Will continue to monitor vital signs and clinical progression.

## 2022-08-16 NOTE — PROGRESS NOTES
Nephrology Progress Note   [facility]      Chief Complaint: Anasarca/weakness    Reason for Consultation: Anasarca and Hyponatremia    History of Present Illness: Ms Bucky Lindsey is a 73 yo female with past medical history significant for stage IV non-small cell lung cancer with widespread metastasis and recent diagnosis of right-sided pulmonary embolism on Eliquis who presented to hospital with generalized weakness & fatigue. She has also noticed worsening swelling of lower extremities extending up her thighs. Routine blood work revealed serum sodium level of 123. Patient cannot accurately tell me how much fluid she's drinking on a daily basis.  at bedside    Subjective:    Lethargic and unresponsive    ROS remains hypotensive/unable to give midodrine due to mental status change. Unobtainable due to lethargy    Scheduled Meds:   midodrine  10 mg Oral TID WC    cefepime  1,000 mg IntraVENous Q12H    sodium chloride flush  10 mL IntraVENous 2 times per day    morphine  15 mg Oral BID    melatonin  9 mg Oral Nightly    [Held by provider] apixaban  5 mg Oral BID    folic acid  1 mg Oral Daily    naloxegol  12.5 mg Oral QAM AC    sennosides-docusate sodium  1 tablet Oral Daily        dextrose 5 % and 0.45 % NaCl 75 mL/hr at 22 1014    heparin (PORCINE) Infusion 960 Units/hr (22 1014)    sodium chloride         PRN Meds:.sodium chloride flush, sodium chloride, promethazine **OR** ondansetron, magnesium hydroxide, iopamidol, oxyCODONE, oxyCODONE-acetaminophen, SUMAtriptan    Physical Exam:    TEMPERATURE:  Current - Temp: (!) 96.3 °F (35.7 °C);  Max - Temp  Av.1 °F (36.2 °C)  Min: 96.3 °F (35.7 °C)  Max: 97.5 °F (36.4 °C)  RESPIRATIONS RANGE: Resp  Av.4  Min: 14  Max: 28  PULSE RANGE: Pulse  Av.5  Min: 82  Max: 100  BLOOD PRESSURE RANGE:  Systolic (60RQQ), ZNX:30 , Min:85 , DQB:970   ; Diastolic (82VQY), FZN:02, Min:37, Max:70    24HR INTAKE/OUTPUT:    Intake/Output Summary (Last 24 hours) at 8/16/2022 1149  Last data filed at 8/16/2022 1014  Gross per 24 hour   Intake 1424.94 ml   Output 175 ml   Net 1249.94 ml         Patient Vitals for the past 96 hrs (Last 3 readings):   Weight   08/16/22 0449 157 lb 11.2 oz (71.5 kg)   08/15/22 0400 162 lb 14.7 oz (73.9 kg)   08/14/22 0400 156 lb 15.5 oz (71.2 kg)         General: Unresponsive  HEENT: Normocephalic, atraumatic  Chest: clear to auscultation, no intercostal retractions  CVS: RRR  Abdomen: soft, non tender  Extremities: ++ edema extending up to buttocks, erythema RLE noted    Past Medical History:   Diagnosis Date    Headache      Past Surgical History:   Procedure Laterality Date    APPENDECTOMY      PORT SURGERY N/A 7/21/2022    PORT A CATHETER PLACEMENT WITH FLUOROSCOPY performed by Franc Duffy MD at [facility]       Family History   Problem Relation Age of Onset    Arthritis Mother     High Blood Pressure Mother      Social History     Socioeconomic History    Marital status: Single     Spouse name: Not on file    Number of children: Not on file    Years of education: Not on file    Highest education level: Not on file   Occupational History    Occupation: health tech   Tobacco Use    Smoking status: Every Day     Packs/day: 1.00     Years: 50.00     Pack years: 50.00     Types: Cigarettes    Smokeless tobacco: Never   Substance and Sexual Activity    Alcohol use: No    Drug use: No    Sexual activity: Not on file   Other Topics Concern    Not on file   Social History Narrative    Not on file     Social Determinants of Health     Financial Resource Strain: Not on file   Food Insecurity: Not on file   Transportation Needs: Not on file   Physical Activity: Not on file   Stress: Not on file   Social Connections: Not on file   Intimate Partner Violence: Not on file   Housing Stability: Not on file         Allergies:  No Known Allergies       LAB DATA:    CBC:   Lab Results   Component Value Date/Time    WBC 9.6 08/15/2022 12:06 PM    RBC 2.94 08/15/2022 12:06 PM    HGB 8.9 08/15/2022 12:06 PM    HCT 26.2 08/15/2022 12:06 PM    MCV 89.2 08/15/2022 12:06 PM    MCH 30.2 08/15/2022 12:06 PM    MCHC 33.8 08/15/2022 12:06 PM    RDW 18.8 08/15/2022 12:06 PM     08/15/2022 12:06 PM    MPV 7.4 08/15/2022 12:06 PM     BMP:    Lab Results   Component Value Date/Time     08/16/2022 03:56 AM    K 5.1 08/16/2022 03:56 AM    K 4.9 08/13/2022 06:08 AM    CL 86 08/16/2022 03:56 AM    CO2 19 08/16/2022 03:56 AM    BUN 37 08/16/2022 03:56 AM    CREATININE 2.9 08/16/2022 03:56 AM    CALCIUM 8.2 08/16/2022 03:56 AM    GFRAA 19 08/16/2022 03:56 AM    LABGLOM 16 08/16/2022 03:56 AM    GLUCOSE 60 08/16/2022 03:56 AM       U/A:    Lab Results   Component Value Date/Time    COLORU DARK YELLOW 08/12/2022 12:40 PM    PHUR 5.0 08/12/2022 12:40 PM    WBCUA 3 08/12/2022 12:40 PM    RBCUA 4 08/12/2022 12:40 PM    MUCUS Present 08/12/2022 12:40 PM    TRICHOMONAS None Seen 11/07/2019 04:45 PM    BACTERIA None Seen 08/12/2022 12:40 PM    CLARITYU CLOUDY 08/12/2022 12:40 PM    SPECGRAV 1.031 08/12/2022 12:40 PM    LEUKOCYTESUR Negative 08/12/2022 12:40 PM    UROBILINOGEN 1.0 08/12/2022 12:40 PM    BILIRUBINUR Negative 08/12/2022 12:40 PM    BLOODU Negative 08/12/2022 12:40 PM    GLUCOSEU Negative 08/12/2022 12:40 PM    AMORPHOUS Present 08/12/2022 12:40 PM         IMPRESSION/RECOMMENDATIONS:      Principal Problem:    Generalized weakness  Resolved Problems:    * No resolved hospital problems. *    REMY - Worse - in setting of hypotension - unable to give Midodrine due to lethargy - not a dialysis candidate given hypotension and comorbidities - plan per RN is to proceed with hospice care - no further recommendations    2. Anasarca - off lasix due to hypotension and poor response to diuretics    3. Hyponatremia - Hypervolemic - worse    4. Stage IV non-small cell lung cancer with widespread metastasis    5.  Hypotension -unable to take Midodrine    6  Cellulitis -

## 2022-08-16 NOTE — PLAN OF CARE
Problem: Discharge Planning  Goal: Discharge to home or other facility with appropriate resources  Outcome: Progressing  Flowsheets (Taken 8/15/2022 1955)  Discharge to home or other facility with appropriate resources: Refer to discharge planning if patient needs post-hospital services based on physician order or complex needs related to functional status, cognitive ability or social support system     Problem: Pain  Goal: Verbalizes/displays adequate comfort level or baseline comfort level  Outcome: Progressing     Problem: ABCDS Injury Assessment  Goal: Absence of physical injury  Outcome: Progressing     Problem: Skin/Tissue Integrity  Goal: Absence of new skin breakdown  Description: 1. Monitor for areas of redness and/or skin breakdown  2. Assess vascular access sites hourly  3. Every 4-6 hours minimum:  Change oxygen saturation probe site  4. Every 4-6 hours:  If on nasal continuous positive airway pressure, respiratory therapy assess nares and determine need for appliance change or resting period.   Outcome: Progressing     Problem: Safety - Adult  Goal: Free from fall injury  Outcome: Progressing

## 2022-08-16 NOTE — DISCHARGE SUMMARY
Hospital Medicine Discharge Summary    Patient ID: Lucia Peace      Patient's PCP: Gorge Leonard MD    Admit Date: 8/12/2022     Discharge Date:   08/16/22      Admitting Provider: Heather Weathers MD     Discharge Provider: Lobito Patel MD     Discharge Diagnoses: Active Hospital Problems    Diagnosis     Generalized weakness [R53.1]      Priority: Medium       The patient was seen and examined on day of discharge and this discharge summary is in conjunction with any daily progress note from day of discharge. Hospital Course:      39-year-old female patient with a past medical history of metastatic non-small cell lung cancer (recently diagnosed) , complicated by pulmonary embolism and IVC thrombosis. Presented to emergency with worsening lethargy, was found to have REMY, hyponatremia. Hospital stay was complicated by metabolic encephalopathy. Acute kidney injury. Anasarca   Stage IV non-small cell lung cancer  Pulmonary embolism  IVC thrombosis  Acute metabolic encephalopathy  Was admitted for worsening left lethargic and anasarca, patient had recently been diagnosed by stage IV lung cancer, repeated scans showed worsening left lung mass after 1 cycle of chemotherapy. During this admission patient had worsening mental status, an MRI was done that showed new brain metastasis in addition to her bone and extensive liver metastasis as well as retroperitoneal lymph nodes. Multiple discussions with the patient's partner by myself as well as Dr. Cheyanne Parham. CODE STATUS changed to comfort care. Physical Exam Performed:     BP (!) 93/29   Pulse 96   Temp (!) 96.3 °F (35.7 °C) (Axillary)   Resp 18   Ht 5' 7\" (1.702 m)   Wt 157 lb 11.2 oz (71.5 kg)   SpO2 94%   BMI 24.70 kg/m²     General appearance: Seems very uncomfortable ,  Not responding to questions  HEENT: Pupils equal, round, and reactive to light. Conjunctivae/corneas clear.   Neck: Supple, with full range of motion. No jugular venous distention. Trachea midline. Respiratory:  Normal respiratory effort. Clear to auscultation, bilaterally without Rales/Wheezes/Rhonchi. Cardiovascular: Regular rate and rhythm with normal S1/S2 without murmurs, rubs or gallops. Abdomen: Soft, non-tender, non-distended with normal bowel sounds. Musculoskeletal: Severe bilateral lower limb edema  skin: Skin color, texture, turgor normal.  No rashes or lesions. Neurologic: Not responding to questions  capillary Refill: Brisk,3 seconds, normal  Peripheral Pulses: +2 palpable, equal bilaterally        Labs: For convenience and continuity at follow-up the following most recent labs are provided:      CBC:    Lab Results   Component Value Date/Time    WBC 9.6 08/15/2022 12:06 PM    HGB 8.9 08/15/2022 12:06 PM    HCT 26.2 08/15/2022 12:06 PM     08/15/2022 12:06 PM       Renal:    Lab Results   Component Value Date/Time     08/16/2022 03:56 AM    K 5.1 08/16/2022 03:56 AM    K 4.9 08/13/2022 06:08 AM    CL 86 08/16/2022 03:56 AM    CO2 19 08/16/2022 03:56 AM    BUN 37 08/16/2022 03:56 AM    CREATININE 2.9 08/16/2022 03:56 AM    CALCIUM 8.2 08/16/2022 03:56 AM    PHOS 4.8 08/16/2022 03:56 AM         Significant Diagnostic Studies    Radiology:   MRI BRAIN WO CONTRAST   Final Result   There are multiple punctate areas of restricted diffusion in the frontal   lobes, parietal lobes and right cerebellar hemisphere. Given the history of   cancer, these certainly are suspicious for multiple metastatic lesions as   discussed above. No contrast was administered for this exam.  Acute areas of   infarct from an embolic source are difficult to entirely exclude but are felt   to be less likely. Postcontrast images are suggested if the patient can   receive contrast.  If contrast cannot be administered, short-term follow-up   brain MRI is suggested. CT HEAD WO CONTRAST   Final Result   1. No acute intracranial abnormality.    2. Chronic right maxillary sinusitis, unchanged. 3. Intracranial metastatic lesions are not well seen on this CT. Follow-up   MRI of the brain without and with IV contrast may be of benefit for further   evaluation. Results were discussed with Dr. Magen Diane at 1419 hours on 08/15/2022. CT CHEST ABDOMEN PELVIS W CONTRAST   Final Result   CHEST:      1. Resolution of the previously identified pulmonary embolism within the   right inter lobar pulmonary artery. 2. Large left upper lobe mass stable to slightly increased in size from   07/19/2022. 3. 6 mm nodule in the medial right lung base significantly decreased in size   from the previous exam.  Several other smaller unchanged scattered bilateral   pulmonary nodules. 4. Moderate emphysematous changes. ABDOMEN AND PELVIS:      1. Filling defect again seen within the inferior vena cava extending to the   confluence of the common iliac veins. This is stable to mildly decreased in   size from the previous study. This may represent a bland thrombus or tumor   thrombus. 2. Extensive hepatic metastases without significant change. 3. Retroperitoneal lymphadenopathy mildly worsened from the previous study. 4. Unchanged thickening of the bilateral adrenal glands suspicious for   metastatic involvement. 5. Aggressive lytic lesion in the left acetabulum without significant change   suspicious for a metastatic lesion. XR CHEST PORTABLE   Final Result   Stable left upper lobe mass.   No acute cardiopulmonary findings                Consults:     IP CONSULT TO HOSPITALIST  IP CONSULT TO HEM/ONC  IP CONSULT TO NEPHROLOGY  IP CONSULT TO PALLIATIVE CARE  IP CONSULT TO PALLIATIVE CARE  IP CONSULT TO HOSPICE    Disposition:  inpatient hospice      Condition at Discharge: Terminal    Discharge Instructions/Follow-up:    - hospice care     Code Status:  DNR-CC     Activity: activity as tolerated    Diet: regular diet      Discharge Medications: Current Discharge Medication List          Time Spent on discharge is more than 30 minutes in the examination, evaluation, counseling and review of medications and discharge plan. Signed:    Kareem Davis MD   8/16/2022      Thank you Dax Huddleston MD for the opportunity to be involved in this patient's care. If you have any questions or concerns, please feel free to contact me at [phone].

## 2022-08-16 NOTE — PROGRESS NOTES
Hematology Oncology Daily Progress Note    Admit Date: 8/12/2022  Hospital day several    Subjective:     Patient unresponsive  Medication side effects: none    Scheduled Meds:   midodrine  10 mg Oral TID WC    cefepime  1,000 mg IntraVENous Q12H    sodium chloride flush  10 mL IntraVENous 2 times per day    morphine  15 mg Oral BID    melatonin  9 mg Oral Nightly    [Held by provider] apixaban  5 mg Oral BID    folic acid  1 mg Oral Daily    naloxegol  12.5 mg Oral QAM AC    sennosides-docusate sodium  1 tablet Oral Daily     Continuous Infusions:   dextrose 5 % and 0.45 % NaCl 75 mL/hr at 08/16/22 1014    heparin (PORCINE) Infusion 960 Units/hr (08/16/22 1014)    sodium chloride       PRN Meds:sodium chloride flush, sodium chloride, promethazine **OR** ondansetron, magnesium hydroxide, iopamidol, oxyCODONE, oxyCODONE-acetaminophen, SUMAtriptan    Review of Systems  Unable to assess    Objective:     Patient Vitals for the past 8 hrs:   BP Temp Temp src Pulse Resp SpO2 Weight   08/16/22 1015 102/70 -- -- 98 16 -- --   08/16/22 0910 -- -- -- 100 -- 94 % --   08/16/22 0900 (!) 87/38 -- -- 97 18 -- --   08/16/22 0759 (!) 89/53 (!) 96.3 °F (35.7 °C) Axillary 100 18 94 % --   08/16/22 0700 (!) 85/46 -- -- 98 19 -- --   08/16/22 0600 (!) 88/48 -- -- 96 19 -- --   08/16/22 0500 (!) 86/46 -- -- 93 20 95 % --   08/16/22 0449 -- -- -- -- -- -- 157 lb 11.2 oz (71.5 kg)   08/16/22 0445 (!) 101/46 -- -- 87 28 -- --   08/16/22 0402 (!) 85/40 -- -- -- -- -- --   08/16/22 0358 (!) 89/37 97.3 °F (36.3 °C) Axillary 83 20 93 % --     I/O last 3 completed shifts: In: 867.8 [I.V.:321.7; IV Piggyback:546.1]  Out: 525 [Urine:525]  I/O this shift:   In: 958.4 [I.V.:335.9; IV Piggyback:622.5]  Out: 50 [Urine:50]    /70   Pulse 98   Temp (!) 96.3 °F (35.7 °C) (Axillary)   Resp 16   Ht 5' 7\" (1.702 m)   Wt 157 lb 11.2 oz (71.5 kg)   SpO2 94%   BMI 24.70 kg/m²         Head:    Normocephalic, without obvious abnormality, atraumatic   Eyes:    PERRL, conjunctiva/corneas clear, EOM's intact, fundi     benign, both eyes   Ears:    Normal TM's and external ear canals, both ears   Nose:   Nares normal, septum midline, mucosa normal, no drainage    or sinus tenderness   Throat:   Lips, mucosa, and tongue normal; teeth and gums normal   Neck:   Supple, symmetrical, trachea midline, no adenopathy;     thyroid:  no enlargement/tenderness/nodules; no carotid    bruit or JVD   Back:     Symmetric, no curvature, ROM normal, no CVA tenderness   Lungs:     Clear to auscultation bilaterally, respirations unlabored   Chest Wall:    No tenderness or deformity    Heart:    Regular rate and rhythm, S1 and S2 normal, no murmur, rub   or gallop       Abdomen:     Soft, non-tender, bowel sounds active all four quadrants,     no masses, no organomegaly           Extremities:   3+ LE edema bilaterally   Pulses:   2+ and symmetric all extremities   Skin:   Skin color, texture, turgor normal, no rashes or lesions   Lymph nodes:   Cervical, supraclavicular, and axillary nodes normal           ECG: normal sinus rhythm, no blocks or conduction defects, no ischemic changes. Data ReviewCBC:   Lab Results   Component Value Date/Time    WBC 9.6 08/15/2022 12:06 PM    RBC 2.94 08/15/2022 12:06 PM       Assessment:     Principal Problem:    Generalized weakness  Resolved Problems:    * No resolved hospital problems. *      Plan:     1. Stage IV non-small cell lung cancer/REMY  I had a very long discussion with her spouse, Tawnya Brittle. He is her medical power of  as well. The patient has one son in [facility] who she is estranged from and has a restraining order against.  She has a granddaughter as well. I explained that the patient is not doing well. Her kidneys are failing. She has brain mets and has become unresponsive. The cancer has gotten worse after her initial round of chemotherapy. I strongly recommended hospice care and he has agreed.   Her life expectancy will be anywhere from a few days to a few weeks. 2.  DNR CC.   Per his request        Electronically signed by Adam Zamarripa MD on 8/16/2022 at 10:38 AM

## 2022-08-16 NOTE — PROGRESS NOTES
Informed MD on call of pt's low urine output and hypotension.  MD ordered 500 cc NS bolus to be given over 1 hour

## 2022-08-16 NOTE — CARE COORDINATION
Call received from Rj Tate #[phone] regarding financial power of . Advised that financial document cannot be completed here in the hospital.Advised Radha Osullivan to contact an  to further discuss.    ISAIAH Tipton, GONZALO, Social Work/Case Management   [phone]  Electronically signed by ISAIAH Tipton, GONZALO on 8/16/2022 at 8:41 AM

## 2022-08-16 NOTE — PROGRESS NOTES
sinusitis, unchanged. 3. Intracranial metastatic lesions are not well seen on this CT. Follow-up   MRI of the brain without and with IV contrast may be of benefit for further   evaluation. Results were discussed with Dr. Carmen Anguiano at 1419 hours on 08/15/2022. CT CHEST ABDOMEN PELVIS W CONTRAST   Final Result   CHEST:      1. Resolution of the previously identified pulmonary embolism within the   right inter lobar pulmonary artery. 2. Large left upper lobe mass stable to slightly increased in size from   07/19/2022. 3. 6 mm nodule in the medial right lung base significantly decreased in size   from the previous exam.  Several other smaller unchanged scattered bilateral   pulmonary nodules. 4. Moderate emphysematous changes. ABDOMEN AND PELVIS:      1. Filling defect again seen within the inferior vena cava extending to the   confluence of the common iliac veins. This is stable to mildly decreased in   size from the previous study. This may represent a bland thrombus or tumor   thrombus. 2. Extensive hepatic metastases without significant change. 3. Retroperitoneal lymphadenopathy mildly worsened from the previous study. 4. Unchanged thickening of the bilateral adrenal glands suspicious for   metastatic involvement. 5. Aggressive lytic lesion in the left acetabulum without significant change   suspicious for a metastatic lesion. XR CHEST PORTABLE   Final Result   Stable left upper lobe mass. No acute cardiopulmonary findings                 Assessment/Plan:    Active Hospital Problems    Diagnosis     Generalized weakness [R53.1]      Priority: Medium     Acute kidney injury.   Anasarca   Stage IV non-small cell lung cancer  Pulmonary embolism  IVC thrombosis  Acute metabolic encephalopathy  Was admitted for worsening left lethargic and anasarca, patient had recently been diagnosed by stage IV lung cancer, repeated scans showed worsening left lung mass after 1 cycle of

## 2022-08-16 NOTE — PROGRESS NOTES
Pt remains lethargic and responsive only to painful stimulus today. Head MRI showed mets to brain per MD, head CT was negative for acute abnormality. Pt placed on heparin gtt as pt unable to tolerate PO meds including her anticoagulation and midodrine. Because she wasn't taking midodrine, lasix gtt was stopped, albumin ordered. SBPs improved to >90, MAP averaging 60-65. Dr. Ondina Rosenberg gave ok for MAP to be above 60. Palliative care spoke to pt regarding code status and prognosis and plan is to keep pt as a full code for now.

## 2022-08-16 NOTE — CARE COORDINATION
Hospice consult noted. Spoke to POA/significant other Shiloh Salomon. Hospice choices given. Shiloh Salomon would like [facility]. Referral made. The Plan for Transition of Care is related to the following treatment goals: hospice    The patient representative Shiloh Salomon was provided with a choice of provider and agrees   with the discharge plan. [x] Yes [] No    Freedom of choice list was provided with basic dialogue that supports the patient's individualized plan of care/goals, treatment preferences and shares the quality data associated with the providers.  [x] Yes [] No    Electronically signed by Anthony Anthony RN Case Management on 8/16/2022 at 11:22 AM

## 2022-08-16 NOTE — CARE COORDINATION
CASE MANAGEMENT DISCHARGE SUMMARY:    DISCHARGE DATE: 8/16/2022    DISCHARGED TO: [facility]ospice [facility]     TRANSPORTATION: [facility]             TIME: 4:30pm    Electronically signed by Lenka Nguyen RN Case Management [phone] on 8/16/2022 at 2:56 PM

## (undated) DEVICE — 3M™ STERI-STRIP™ COMPOUND BENZOIN TINCTURE 40 BAGS/CARTON 4 CARTONS/CASE C1544: Brand: 3M™ STERI-STRIP™

## (undated) DEVICE — NEEDLE HYPO 25GA L1.5IN BVL ORIENTED ECLIPSE

## (undated) DEVICE — STRIP,CLOSURE,WOUND,MEDI-STRIP,1/2X4: Brand: MEDLINE

## (undated) DEVICE — CLEANER,CAUTERY TIP,2X2",STERILE: Brand: MEDLINE

## (undated) DEVICE — 48" PROBE COVER W/GEL, ULTRASOUND, STERILE: Brand: SITE-RITE

## (undated) DEVICE — DRAPE,T,LAPARO,TRANS,STERILE: Brand: MEDLINE

## (undated) DEVICE — CANISTER, RIGID, 1200CC: Brand: MEDLINE INDUSTRIES, INC.

## (undated) DEVICE — C-ARM: Brand: UNBRANDED

## (undated) DEVICE — SYRINGE TB 1ML NDL 27GA L0.5IN PLAS W/ SFTY LOK PERM NDL

## (undated) DEVICE — SUTURE VCRL + SZ 4-0 L18IN ABSRB UD L19MM PS-2 3/8 CIR PRIM VCP496H

## (undated) DEVICE — PROVE COVER: Brand: UNBRANDED

## (undated) DEVICE — MINOR SET UP: Brand: MEDLINE INDUSTRIES, INC.

## (undated) DEVICE — 3M™ TEGADERM™ TRANSPARENT FILM DRESSING FRAME STYLE, 1626W, 4 IN X 4-3/4 IN (10 CM X 12 CM), 50/CT 4CT/CASE: Brand: 3M™ TEGADERM™

## (undated) DEVICE — SYRINGE MED 5ML STD CLR PLAS N CTRL SLIP TIP DISP

## (undated) DEVICE — SUTURE VCRL + SZ 3-0 L27IN ABSRB UD L26MM SH 1/2 CIR VCP416H

## (undated) DEVICE — GAUZE,SPONGE,2"X2",8PLY,STERILE,LF,2'S: Brand: MEDLINE

## (undated) DEVICE — GLOVE ORANGE PI 7   MSG9070

## (undated) DEVICE — SOLUTION IV 1000ML 0.9% SOD CHL PH 5 INJ USP VIAFLX PLAS

## (undated) DEVICE — SYRINGE MED 10ML LUERLOCK TIP W/O SFTY DISP

## (undated) DEVICE — DECANTER BAG 9": Brand: MEDLINE INDUSTRIES, INC.

## (undated) DEVICE — INTENDED FOR TISSUE SEPARATION, AND OTHER PROCEDURES THAT REQUIRE A SHARP SURGICAL BLADE TO PUNCTURE OR CUT.: Brand: BARD-PARKER ® STAINLESS STEEL BLADES